# Patient Record
Sex: MALE | Race: WHITE | Employment: OTHER | URBAN - METROPOLITAN AREA
[De-identification: names, ages, dates, MRNs, and addresses within clinical notes are randomized per-mention and may not be internally consistent; named-entity substitution may affect disease eponyms.]

---

## 2019-01-02 ENCOUNTER — OP HISTORICAL/CONVERTED ENCOUNTER (OUTPATIENT)
Dept: OTHER | Age: 79
End: 2019-01-02

## 2019-01-08 ENCOUNTER — OP HISTORICAL/CONVERTED ENCOUNTER (OUTPATIENT)
Dept: OTHER | Age: 79
End: 2019-01-08

## 2019-01-09 ENCOUNTER — OP HISTORICAL/CONVERTED ENCOUNTER (OUTPATIENT)
Dept: OTHER | Age: 79
End: 2019-01-09

## 2019-01-16 ENCOUNTER — OP HISTORICAL/CONVERTED ENCOUNTER (OUTPATIENT)
Dept: OTHER | Age: 79
End: 2019-01-16

## 2019-01-23 ENCOUNTER — OP HISTORICAL/CONVERTED ENCOUNTER (OUTPATIENT)
Dept: OTHER | Age: 79
End: 2019-01-23

## 2019-01-30 ENCOUNTER — OP HISTORICAL/CONVERTED ENCOUNTER (OUTPATIENT)
Dept: OTHER | Age: 79
End: 2019-01-30

## 2019-02-06 ENCOUNTER — OP HISTORICAL/CONVERTED ENCOUNTER (OUTPATIENT)
Dept: OTHER | Age: 79
End: 2019-02-06

## 2019-02-13 ENCOUNTER — OP HISTORICAL/CONVERTED ENCOUNTER (OUTPATIENT)
Dept: OTHER | Age: 79
End: 2019-02-13

## 2019-02-20 ENCOUNTER — OP HISTORICAL/CONVERTED ENCOUNTER (OUTPATIENT)
Dept: OTHER | Age: 79
End: 2019-02-20

## 2019-03-06 ENCOUNTER — OP HISTORICAL/CONVERTED ENCOUNTER (OUTPATIENT)
Dept: OTHER | Age: 79
End: 2019-03-06

## 2019-03-20 ENCOUNTER — OP HISTORICAL/CONVERTED ENCOUNTER (OUTPATIENT)
Dept: OTHER | Age: 79
End: 2019-03-20

## 2019-04-10 ENCOUNTER — OP HISTORICAL/CONVERTED ENCOUNTER (OUTPATIENT)
Dept: OTHER | Age: 79
End: 2019-04-10

## 2019-04-17 ENCOUNTER — OP HISTORICAL/CONVERTED ENCOUNTER (OUTPATIENT)
Dept: OTHER | Age: 79
End: 2019-04-17

## 2019-04-24 ENCOUNTER — OP HISTORICAL/CONVERTED ENCOUNTER (OUTPATIENT)
Dept: OTHER | Age: 79
End: 2019-04-24

## 2019-05-01 ENCOUNTER — OP HISTORICAL/CONVERTED ENCOUNTER (OUTPATIENT)
Dept: OTHER | Age: 79
End: 2019-05-01

## 2019-05-08 ENCOUNTER — OP HISTORICAL/CONVERTED ENCOUNTER (OUTPATIENT)
Dept: OTHER | Age: 79
End: 2019-05-08

## 2019-05-22 ENCOUNTER — OP HISTORICAL/CONVERTED ENCOUNTER (OUTPATIENT)
Dept: OTHER | Age: 79
End: 2019-05-22

## 2019-06-05 ENCOUNTER — OP HISTORICAL/CONVERTED ENCOUNTER (OUTPATIENT)
Dept: OTHER | Age: 79
End: 2019-06-05

## 2019-06-26 ENCOUNTER — OP HISTORICAL/CONVERTED ENCOUNTER (OUTPATIENT)
Dept: OTHER | Age: 79
End: 2019-06-26

## 2019-07-10 ENCOUNTER — OP HISTORICAL/CONVERTED ENCOUNTER (OUTPATIENT)
Dept: OTHER | Age: 79
End: 2019-07-10

## 2019-07-31 ENCOUNTER — OP HISTORICAL/CONVERTED ENCOUNTER (OUTPATIENT)
Dept: OTHER | Age: 79
End: 2019-07-31

## 2019-08-21 ENCOUNTER — OP HISTORICAL/CONVERTED ENCOUNTER (OUTPATIENT)
Dept: OTHER | Age: 79
End: 2019-08-21

## 2019-09-11 ENCOUNTER — OP HISTORICAL/CONVERTED ENCOUNTER (OUTPATIENT)
Dept: OTHER | Age: 79
End: 2019-09-11

## 2019-10-09 ENCOUNTER — OP HISTORICAL/CONVERTED ENCOUNTER (OUTPATIENT)
Dept: OTHER | Age: 79
End: 2019-10-09

## 2020-09-30 VITALS
HEIGHT: 72 IN | SYSTOLIC BLOOD PRESSURE: 126 MMHG | TEMPERATURE: 97.7 F | RESPIRATION RATE: 96 BRPM | BODY MASS INDEX: 39.55 KG/M2 | WEIGHT: 292 LBS | OXYGEN SATURATION: 94 % | DIASTOLIC BLOOD PRESSURE: 62 MMHG | HEART RATE: 92 BPM

## 2020-09-30 PROBLEM — I10 HYPERTENSIVE DISORDER: Status: ACTIVE | Noted: 2020-09-30

## 2020-09-30 PROBLEM — I89.0 LYMPHEDEMA: Status: ACTIVE | Noted: 2020-09-30

## 2020-09-30 RX ORDER — ATORVASTATIN CALCIUM 40 MG/1
40 TABLET, FILM COATED ORAL DAILY
COMMUNITY

## 2020-09-30 RX ORDER — HYDROCHLOROTHIAZIDE 25 MG/1
25 TABLET ORAL DAILY
COMMUNITY
End: 2021-01-01 | Stop reason: ALTCHOICE

## 2020-09-30 RX ORDER — IPRATROPIUM BROMIDE AND ALBUTEROL SULFATE 2.5; .5 MG/3ML; MG/3ML
3 SOLUTION RESPIRATORY (INHALATION)
COMMUNITY
End: 2021-01-01

## 2020-09-30 RX ORDER — ASPIRIN 81 MG/1
81 TABLET ORAL DAILY
COMMUNITY

## 2021-01-01 ENCOUNTER — APPOINTMENT (OUTPATIENT)
Dept: NON INVASIVE DIAGNOSTICS | Age: 81
DRG: 299 | End: 2021-01-01
Attending: INTERNAL MEDICINE
Payer: MEDICARE

## 2021-01-01 ENCOUNTER — APPOINTMENT (OUTPATIENT)
Dept: NON INVASIVE DIAGNOSTICS | Age: 81
DRG: 299 | End: 2021-01-01
Attending: STUDENT IN AN ORGANIZED HEALTH CARE EDUCATION/TRAINING PROGRAM
Payer: MEDICARE

## 2021-01-01 ENCOUNTER — HOSPITAL ENCOUNTER (INPATIENT)
Age: 81
LOS: 10 days | DRG: 299 | End: 2021-11-01
Attending: EMERGENCY MEDICINE | Admitting: INTERNAL MEDICINE
Payer: MEDICARE

## 2021-01-01 ENCOUNTER — APPOINTMENT (OUTPATIENT)
Dept: GENERAL RADIOLOGY | Age: 81
DRG: 299 | End: 2021-01-01
Attending: NURSE PRACTITIONER
Payer: MEDICARE

## 2021-01-01 ENCOUNTER — APPOINTMENT (OUTPATIENT)
Dept: GENERAL RADIOLOGY | Age: 81
DRG: 299 | End: 2021-01-01
Attending: PHYSICIAN ASSISTANT
Payer: MEDICARE

## 2021-01-01 ENCOUNTER — APPOINTMENT (OUTPATIENT)
Dept: CT IMAGING | Age: 81
DRG: 299 | End: 2021-01-01
Attending: NURSE PRACTITIONER
Payer: MEDICARE

## 2021-01-01 ENCOUNTER — APPOINTMENT (OUTPATIENT)
Dept: ULTRASOUND IMAGING | Age: 81
DRG: 299 | End: 2021-01-01
Attending: PHYSICIAN ASSISTANT
Payer: MEDICARE

## 2021-01-01 ENCOUNTER — APPOINTMENT (OUTPATIENT)
Dept: NON INVASIVE DIAGNOSTICS | Age: 81
DRG: 299 | End: 2021-01-01
Attending: SURGERY
Payer: MEDICARE

## 2021-01-01 VITALS
HEART RATE: 67 BPM | TEMPERATURE: 97.6 F | DIASTOLIC BLOOD PRESSURE: 60 MMHG | RESPIRATION RATE: 20 BRPM | OXYGEN SATURATION: 95 % | BODY MASS INDEX: 33.86 KG/M2 | WEIGHT: 250 LBS | HEIGHT: 72 IN | SYSTOLIC BLOOD PRESSURE: 135 MMHG

## 2021-01-01 DIAGNOSIS — R09.02 HYPOXIA: Primary | ICD-10-CM

## 2021-01-01 DIAGNOSIS — R60.9 EDEMA, UNSPECIFIED TYPE: ICD-10-CM

## 2021-01-01 DIAGNOSIS — S91.301A WOUND OF RIGHT FOOT: ICD-10-CM

## 2021-01-01 DIAGNOSIS — R60.9 EDEMA: ICD-10-CM

## 2021-01-01 DIAGNOSIS — S91.302A WOUND OF LEFT FOOT: ICD-10-CM

## 2021-01-01 DIAGNOSIS — I89.0 LYMPHEDEMA OF BOTH LOWER EXTREMITIES: ICD-10-CM

## 2021-01-01 LAB
25(OH)D3 SERPL-MCNC: 33.2 NG/ML (ref 30–100)
ABO + RH BLD: NORMAL
ALBUMIN SERPL-MCNC: 1.5 G/DL (ref 3.5–5)
ALBUMIN SERPL-MCNC: 1.5 G/DL (ref 3.5–5)
ALBUMIN SERPL-MCNC: 1.6 G/DL (ref 3.5–5)
ALBUMIN SERPL-MCNC: 1.8 G/DL (ref 3.5–5)
ALBUMIN SERPL-MCNC: 2 G/DL (ref 3.5–5)
ALBUMIN SERPL-MCNC: 2.3 G/DL (ref 3.5–5)
ALBUMIN SERPL-MCNC: 2.3 G/DL (ref 3.5–5)
ALBUMIN SERPL-MCNC: 2.5 G/DL (ref 3.5–5)
ALBUMIN/GLOB SERPL: 0.4 {RATIO} (ref 1.1–2.2)
ALBUMIN/GLOB SERPL: 0.5 {RATIO} (ref 1.1–2.2)
ALP SERPL-CCNC: 119 U/L (ref 45–117)
ALP SERPL-CCNC: 58 U/L (ref 45–117)
ALT SERPL-CCNC: 22 U/L (ref 12–78)
ALT SERPL-CCNC: 24 U/L (ref 12–78)
ANION GAP SERPL CALC-SCNC: 10 MMOL/L (ref 5–15)
ANION GAP SERPL CALC-SCNC: 11 MMOL/L (ref 5–15)
ANION GAP SERPL CALC-SCNC: 4 MMOL/L (ref 5–15)
ANION GAP SERPL CALC-SCNC: 5 MMOL/L (ref 5–15)
ANION GAP SERPL CALC-SCNC: 5 MMOL/L (ref 5–15)
ANION GAP SERPL CALC-SCNC: 6 MMOL/L (ref 5–15)
ANION GAP SERPL CALC-SCNC: 6 MMOL/L (ref 5–15)
ANION GAP SERPL CALC-SCNC: 7 MMOL/L (ref 5–15)
ANION GAP SERPL CALC-SCNC: 8 MMOL/L (ref 5–15)
APPEARANCE UR: CLEAR
AST SERPL W P-5'-P-CCNC: 18 U/L (ref 15–37)
AST SERPL W P-5'-P-CCNC: 37 U/L (ref 15–37)
ATRIAL RATE: 62 BPM
ATRIAL RATE: 63 BPM
BACTERIA SPEC CULT: NORMAL
BACTERIA URNS QL MICRO: NEGATIVE /HPF
BASOPHILS # BLD: 0 K/UL (ref 0–0.1)
BASOPHILS # BLD: 0.1 K/UL (ref 0–0.1)
BASOPHILS # BLD: 0.2 K/UL (ref 0–0.1)
BASOPHILS NFR BLD: 0 % (ref 0–1)
BASOPHILS NFR BLD: 1 % (ref 0–1)
BILIRUB SERPL-MCNC: 0.3 MG/DL (ref 0.2–1)
BILIRUB SERPL-MCNC: 0.6 MG/DL (ref 0.2–1)
BILIRUB UR QL: NEGATIVE
BLD PROD TYP BPU: NORMAL
BLOOD GROUP ANTIBODIES SERPL: NEGATIVE
BNP SERPL-MCNC: ABNORMAL PG/ML
BPU ID: NORMAL
BUN SERPL-MCNC: 130 MG/DL (ref 6–20)
BUN SERPL-MCNC: 132 MG/DL (ref 6–20)
BUN SERPL-MCNC: 143 MG/DL (ref 6–20)
BUN SERPL-MCNC: 143 MG/DL (ref 6–20)
BUN SERPL-MCNC: 179 MG/DL (ref 6–20)
BUN SERPL-MCNC: 183 MG/DL (ref 6–20)
BUN SERPL-MCNC: 47 MG/DL (ref 6–20)
BUN SERPL-MCNC: 50 MG/DL (ref 6–20)
BUN SERPL-MCNC: 59 MG/DL (ref 6–20)
BUN SERPL-MCNC: 71 MG/DL (ref 6–20)
BUN SERPL-MCNC: 88 MG/DL (ref 6–20)
BUN/CREAT SERPL: 16 (ref 12–20)
BUN/CREAT SERPL: 16 (ref 12–20)
BUN/CREAT SERPL: 17 (ref 12–20)
BUN/CREAT SERPL: 18 (ref 12–20)
BUN/CREAT SERPL: 25 (ref 12–20)
BUN/CREAT SERPL: 37 (ref 12–20)
BUN/CREAT SERPL: 40 (ref 12–20)
BUN/CREAT SERPL: 41 (ref 12–20)
BUN/CREAT SERPL: 44 (ref 12–20)
BUN/CREAT SERPL: 47 (ref 12–20)
BUN/CREAT SERPL: 48 (ref 12–20)
CA-I BLD-MCNC: 7.2 MG/DL (ref 8.5–10.1)
CA-I BLD-MCNC: 8 MG/DL (ref 8.5–10.1)
CA-I BLD-MCNC: 8.1 MG/DL (ref 8.5–10.1)
CA-I BLD-MCNC: 8.3 MG/DL (ref 8.5–10.1)
CA-I BLD-MCNC: 8.3 MG/DL (ref 8.5–10.1)
CA-I BLD-MCNC: 8.4 MG/DL (ref 8.5–10.1)
CA-I BLD-MCNC: 8.5 MG/DL (ref 8.5–10.1)
CA-I BLD-MCNC: 8.6 MG/DL (ref 8.5–10.1)
CA-I BLD-MCNC: 8.8 MG/DL (ref 8.5–10.1)
CA-I BLD-MCNC: 9 MG/DL (ref 8.5–10.1)
CALCULATED P AXIS, ECG09: 37 DEGREES
CALCULATED P AXIS, ECG09: 62 DEGREES
CALCULATED R AXIS, ECG10: 22 DEGREES
CALCULATED R AXIS, ECG10: 86 DEGREES
CALCULATED T AXIS, ECG11: -16 DEGREES
CALCULATED T AXIS, ECG11: 12 DEGREES
CHLORIDE SERPL-SCNC: 106 MMOL/L (ref 97–108)
CHLORIDE SERPL-SCNC: 107 MMOL/L (ref 97–108)
CHLORIDE SERPL-SCNC: 107 MMOL/L (ref 97–108)
CHLORIDE SERPL-SCNC: 109 MMOL/L (ref 97–108)
CHLORIDE SERPL-SCNC: 110 MMOL/L (ref 97–108)
CHLORIDE SERPL-SCNC: 111 MMOL/L (ref 97–108)
CHLORIDE SERPL-SCNC: 112 MMOL/L (ref 97–108)
CHLORIDE SERPL-SCNC: 113 MMOL/L (ref 97–108)
CHLORIDE SERPL-SCNC: 114 MMOL/L (ref 97–108)
CHLORIDE UR-SCNC: 117 MMOL/L
CHOLEST SERPL-MCNC: 165 MG/DL
CO2 SERPL-SCNC: 26 MMOL/L (ref 21–32)
CO2 SERPL-SCNC: 27 MMOL/L (ref 21–32)
CO2 SERPL-SCNC: 28 MMOL/L (ref 21–32)
CO2 SERPL-SCNC: 28 MMOL/L (ref 21–32)
CO2 SERPL-SCNC: 29 MMOL/L (ref 21–32)
CO2 SERPL-SCNC: 29 MMOL/L (ref 21–32)
CO2 SERPL-SCNC: 30 MMOL/L (ref 21–32)
CO2 SERPL-SCNC: 31 MMOL/L (ref 21–32)
COLLECT DATE STL: ABNORMAL
COLOR UR: ABNORMAL
CREAT SERPL-MCNC: 2.83 MG/DL (ref 0.7–1.3)
CREAT SERPL-MCNC: 3.03 MG/DL (ref 0.7–1.3)
CREAT SERPL-MCNC: 3.11 MG/DL (ref 0.7–1.3)
CREAT SERPL-MCNC: 3.45 MG/DL (ref 0.7–1.3)
CREAT SERPL-MCNC: 3.47 MG/DL (ref 0.7–1.3)
CREAT SERPL-MCNC: 3.52 MG/DL (ref 0.7–1.3)
CREAT SERPL-MCNC: 3.59 MG/DL (ref 0.7–1.3)
CREAT SERPL-MCNC: 3.6 MG/DL (ref 0.7–1.3)
CREAT SERPL-MCNC: 3.82 MG/DL (ref 0.7–1.3)
CREAT SERPL-MCNC: 3.82 MG/DL (ref 0.7–1.3)
CREAT SERPL-MCNC: 3.89 MG/DL (ref 0.7–1.3)
CREAT UR-MCNC: 28 MG/DL
CREAT UR-MCNC: 29 MG/DL
CROSSMATCH RESULT,%XM: NORMAL
CRP SERPL-MCNC: 3.7 MG/DL (ref 0–0.6)
DATE LAST DOSE: 0
DIAGNOSIS, 93000: NORMAL
DIAGNOSIS, 93000: NORMAL
DIFFERENTIAL METHOD BLD: ABNORMAL
ECHO AO ASC DIAM: 3.8 CM
ECHO AO ROOT DIAM: 3.3 CM
ECHO AR MAX VEL PISA: 416 CM/S
ECHO AV AREA PEAK VELOCITY: 1.88 CM2
ECHO AV AREA VTI: 1.84 CM2
ECHO AV AREA/BSA PEAK VELOCITY: 0.8 CM2/M2
ECHO AV AREA/BSA VTI: 0.8 CM2/M2
ECHO AV MEAN GRADIENT: 24 MMHG
ECHO AV MEAN VELOCITY: 215 CM/S
ECHO AV PEAK GRADIENT: 43 MMHG
ECHO AV PEAK VELOCITY: 327 CM/S
ECHO AV REGURGITANT PHT: 641 MS
ECHO AV VTI: 77 CM
ECHO LA MAJOR AXIS: 4.5 CM
ECHO LA MINOR AXIS: 1.92 CM
ECHO LV E' SEPTAL VELOCITY: 4.78 CM/S
ECHO LV EDV A2C: 87 CM3
ECHO LV EDV A4C: 40 CM3
ECHO LV ESV A2C: 68.4 CM3
ECHO LV ESV A4C: 58 CM3
ECHO LV INTERNAL DIMENSION DIASTOLIC: 5.47 CM (ref 4.2–5.9)
ECHO LV INTERNAL DIMENSION SYSTOLIC: 4.09 CM
ECHO LV IVSD: 1.26 CM (ref 0.6–1)
ECHO LV MASS 2D: 231.1 G (ref 88–224)
ECHO LV MASS INDEX 2D: 98.8 G/M2 (ref 49–115)
ECHO LV POSTERIOR WALL DIASTOLIC: 0.88 CM (ref 0.6–1)
ECHO LVOT DIAM: 2.6 CM
ECHO LVOT PEAK GRADIENT: 5 MMHG
ECHO LVOT PEAK VELOCITY: 116 CM/S
ECHO LVOT SV: 142 CM3
ECHO LVOT VTI: 26.7 CM
ECHO MV A VELOCITY: 87.6 CM/S
ECHO MV AREA PHT: 2.65 CM2
ECHO MV E VELOCITY: 53.6 CM/S
ECHO MV E/A RATIO: 0.61
ECHO MV E/E' SEPTAL: 11.21
ECHO MV MAX VELOCITY: 95 CM/S
ECHO MV MEAN GRADIENT: 1 MMHG
ECHO MV PEAK GRADIENT: 4 MMHG
ECHO MV PRESSURE HALF TIME (PHT): 83 MS
ECHO MV VTI: 38.4 CM
ECHO PV MAX VELOCITY: 156 CM/S
ECHO PV MEAN GRADIENT: 5 MMHG
ECHO PV PEAK INSTANTANEOUS GRADIENT SYSTOLIC: 10 MMHG
ECHO PV VTI: 25.2 CM
ECHO TV MAX VELOCITY: 142 CM/S
ECHO TV REGURGITANT PEAK GRADIENT: 8 MMHG
EOSINOPHIL # BLD: 0 K/UL (ref 0–0.4)
EOSINOPHIL # BLD: 0.1 K/UL (ref 0–0.4)
EOSINOPHIL # BLD: 0.1 K/UL (ref 0–0.4)
EOSINOPHIL NFR BLD: 0 % (ref 0–7)
EOSINOPHIL NFR BLD: 1 % (ref 0–7)
EOSINOPHIL NFR BLD: 1 % (ref 0–7)
ERYTHROCYTE [DISTWIDTH] IN BLOOD BY AUTOMATED COUNT: 14.9 % (ref 11.5–14.5)
ERYTHROCYTE [DISTWIDTH] IN BLOOD BY AUTOMATED COUNT: 15 % (ref 11.5–14.5)
ERYTHROCYTE [DISTWIDTH] IN BLOOD BY AUTOMATED COUNT: 15 % (ref 11.5–14.5)
ERYTHROCYTE [DISTWIDTH] IN BLOOD BY AUTOMATED COUNT: 15.1 % (ref 11.5–14.5)
ERYTHROCYTE [DISTWIDTH] IN BLOOD BY AUTOMATED COUNT: 15.3 % (ref 11.5–14.5)
ERYTHROCYTE [DISTWIDTH] IN BLOOD BY AUTOMATED COUNT: 15.4 % (ref 11.5–14.5)
ERYTHROCYTE [DISTWIDTH] IN BLOOD BY AUTOMATED COUNT: 17.2 % (ref 11.5–14.5)
ERYTHROCYTE [DISTWIDTH] IN BLOOD BY AUTOMATED COUNT: 19 % (ref 11.5–14.5)
EST. AVERAGE GLUCOSE BLD GHB EST-MCNC: 105 MG/DL
FERRITIN SERPL-MCNC: 105 NG/ML (ref 26–388)
FERRITIN SERPL-MCNC: 71 NG/ML (ref 26–388)
GLOBULIN SER CALC-MCNC: 3.2 G/DL (ref 2–4)
GLOBULIN SER CALC-MCNC: 5.9 G/DL (ref 2–4)
GLUCOSE SERPL-MCNC: 100 MG/DL (ref 65–100)
GLUCOSE SERPL-MCNC: 103 MG/DL (ref 65–100)
GLUCOSE SERPL-MCNC: 104 MG/DL (ref 65–100)
GLUCOSE SERPL-MCNC: 105 MG/DL (ref 65–100)
GLUCOSE SERPL-MCNC: 107 MG/DL (ref 65–100)
GLUCOSE SERPL-MCNC: 111 MG/DL (ref 65–100)
GLUCOSE SERPL-MCNC: 115 MG/DL (ref 65–100)
GLUCOSE SERPL-MCNC: 122 MG/DL (ref 65–100)
GLUCOSE SERPL-MCNC: 124 MG/DL (ref 65–100)
GLUCOSE SERPL-MCNC: 126 MG/DL (ref 65–100)
GLUCOSE SERPL-MCNC: 95 MG/DL (ref 65–100)
GLUCOSE UR STRIP.AUTO-MCNC: NEGATIVE MG/DL
GRAM STN SPEC: NORMAL
HBA1C MFR BLD: 5.3 % (ref 4–5.6)
HCT VFR BLD AUTO: 19.1 % (ref 36.6–50.3)
HCT VFR BLD AUTO: 21.2 % (ref 36.6–50.3)
HCT VFR BLD AUTO: 25.6 % (ref 36.6–50.3)
HCT VFR BLD AUTO: 26.5 % (ref 36.6–50.3)
HCT VFR BLD AUTO: 32.1 % (ref 36.6–50.3)
HCT VFR BLD AUTO: 32.5 % (ref 36.6–50.3)
HCT VFR BLD AUTO: 34.1 % (ref 36.6–50.3)
HCT VFR BLD AUTO: 35.2 % (ref 36.6–50.3)
HCT VFR BLD AUTO: 38.1 % (ref 36.6–50.3)
HCT VFR BLD AUTO: 39.1 % (ref 36.6–50.3)
HDLC SERPL-MCNC: 63 MG/DL
HDLC SERPL: 2.6 {RATIO} (ref 0–5)
HEMOCCULT SP1 STL QL: POSITIVE
HGB BLD-MCNC: 10 G/DL (ref 12.1–17)
HGB BLD-MCNC: 10.4 G/DL (ref 12.1–17)
HGB BLD-MCNC: 10.8 G/DL (ref 12.1–17)
HGB BLD-MCNC: 11.7 G/DL (ref 12.1–17)
HGB BLD-MCNC: 5.7 G/DL (ref 12.1–17)
HGB BLD-MCNC: 6.1 G/DL (ref 12.1–17)
HGB BLD-MCNC: 7.6 G/DL (ref 12.1–17)
HGB BLD-MCNC: 7.9 G/DL (ref 12.1–17)
HGB BLD-MCNC: 9.5 G/DL (ref 12.1–17)
HGB BLD-MCNC: 9.5 G/DL (ref 12.1–17)
HGB UR QL STRIP: ABNORMAL
IMM GRANULOCYTES # BLD AUTO: 0 K/UL
IMM GRANULOCYTES # BLD AUTO: 0 K/UL (ref 0–0.04)
IMM GRANULOCYTES # BLD AUTO: 0.1 K/UL (ref 0–0.04)
IMM GRANULOCYTES NFR BLD AUTO: 0 %
IMM GRANULOCYTES NFR BLD AUTO: 0 % (ref 0–0.5)
IMM GRANULOCYTES NFR BLD AUTO: 1 % (ref 0–0.5)
IRON SATN MFR SERPL: 15 % (ref 20–50)
IRON SATN MFR SERPL: 22 % (ref 20–50)
IRON SATN MFR SERPL: 34 % (ref 20–50)
IRON SERPL-MCNC: 33 UG/DL (ref 35–150)
IRON SERPL-MCNC: 44 UG/DL (ref 35–150)
IRON SERPL-MCNC: 71 UG/DL (ref 35–150)
KETONES UR QL STRIP.AUTO: NEGATIVE MG/DL
LACTATE SERPL-SCNC: 0.9 MMOL/L (ref 0.4–2)
LDLC SERPL CALC-MCNC: 81.6 MG/DL (ref 0–100)
LEFT ABI: 1.2
LEFT ARM BP: 150 MMHG
LEFT POSTERIOR TIBIAL: 184 MMHG
LEUKOCYTE ESTERASE UR QL STRIP.AUTO: NEGATIVE
LIPASE SERPL-CCNC: 121 U/L (ref 73–393)
LIPID PROFILE,FLP: NORMAL
LVOT MG: 3 MMHG
LYMPHOCYTES # BLD: 0.9 K/UL (ref 0.8–3.5)
LYMPHOCYTES # BLD: 1 K/UL (ref 0.8–3.5)
LYMPHOCYTES # BLD: 1.3 K/UL (ref 0.8–3.5)
LYMPHOCYTES # BLD: 1.5 K/UL (ref 0.8–3.5)
LYMPHOCYTES # BLD: 2.5 K/UL (ref 0.8–3.5)
LYMPHOCYTES NFR BLD: 10 % (ref 12–49)
LYMPHOCYTES NFR BLD: 12 % (ref 12–49)
LYMPHOCYTES NFR BLD: 8 % (ref 12–49)
MAGNESIUM SERPL-MCNC: 2 MG/DL (ref 1.6–2.4)
MCH RBC QN AUTO: 27.6 PG (ref 26–34)
MCH RBC QN AUTO: 27.9 PG (ref 26–34)
MCH RBC QN AUTO: 28.4 PG (ref 26–34)
MCH RBC QN AUTO: 28.4 PG (ref 26–34)
MCH RBC QN AUTO: 28.7 PG (ref 26–34)
MCH RBC QN AUTO: 29.5 PG (ref 26–34)
MCHC RBC AUTO-ENTMCNC: 28.3 G/DL (ref 30–36.5)
MCHC RBC AUTO-ENTMCNC: 28.8 G/DL (ref 30–36.5)
MCHC RBC AUTO-ENTMCNC: 29.2 G/DL (ref 30–36.5)
MCHC RBC AUTO-ENTMCNC: 29.3 G/DL (ref 30–36.5)
MCHC RBC AUTO-ENTMCNC: 29.5 G/DL (ref 30–36.5)
MCHC RBC AUTO-ENTMCNC: 29.6 G/DL (ref 30–36.5)
MCHC RBC AUTO-ENTMCNC: 29.7 G/DL (ref 30–36.5)
MCHC RBC AUTO-ENTMCNC: 29.8 G/DL (ref 30–36.5)
MCHC RBC AUTO-ENTMCNC: 29.8 G/DL (ref 30–36.5)
MCHC RBC AUTO-ENTMCNC: 29.9 G/DL (ref 30–36.5)
MCV RBC AUTO: 94.4 FL (ref 80–99)
MCV RBC AUTO: 95.3 FL (ref 80–99)
MCV RBC AUTO: 95.3 FL (ref 80–99)
MCV RBC AUTO: 95.8 FL (ref 80–99)
MCV RBC AUTO: 96.2 FL (ref 80–99)
MCV RBC AUTO: 96.4 FL (ref 80–99)
MCV RBC AUTO: 96.6 FL (ref 80–99)
MCV RBC AUTO: 97.2 FL (ref 80–99)
MCV RBC AUTO: 98.6 FL (ref 80–99)
MCV RBC AUTO: 99 FL (ref 80–99)
MONOCYTES # BLD: 0.6 K/UL (ref 0–1)
MONOCYTES # BLD: 0.7 K/UL (ref 0–1)
MONOCYTES # BLD: 0.8 K/UL (ref 0–1)
MONOCYTES # BLD: 0.8 K/UL (ref 0–1)
MONOCYTES # BLD: 1 K/UL (ref 0–1)
MONOCYTES NFR BLD: 4 % (ref 5–13)
MONOCYTES NFR BLD: 6 % (ref 5–13)
MONOCYTES NFR BLD: 6 % (ref 5–13)
MONOCYTES NFR BLD: 7 % (ref 5–13)
MONOCYTES NFR BLD: 8 % (ref 5–13)
MUCOUS THREADS URNS QL MICRO: ABNORMAL /LPF
MV DEC SLOPE: 1700 MM/S2
MV DEC SLOPE: 1870 MM/S2
MV DEC SLOPE: 2030 MM/S2
NEUTS SEG # BLD: 10.1 K/UL (ref 1.8–8)
NEUTS SEG # BLD: 17.6 K/UL (ref 1.8–8)
NEUTS SEG # BLD: 8.4 K/UL (ref 1.8–8)
NEUTS SEG # BLD: 9.2 K/UL (ref 1.8–8)
NEUTS SEG # BLD: 9.8 K/UL (ref 1.8–8)
NEUTS SEG NFR BLD: 78 % (ref 32–75)
NEUTS SEG NFR BLD: 79 % (ref 32–75)
NEUTS SEG NFR BLD: 82 % (ref 32–75)
NEUTS SEG NFR BLD: 83 % (ref 32–75)
NEUTS SEG NFR BLD: 84 % (ref 32–75)
NITRITE UR QL STRIP.AUTO: NEGATIVE
NRBC # BLD: 0 K/UL (ref 0–0.01)
NRBC # BLD: 0.02 K/UL (ref 0–0.01)
NRBC # BLD: 0.03 K/UL (ref 0–0.01)
NRBC # BLD: 0.04 K/UL (ref 0–0.01)
NRBC # BLD: 0.04 K/UL (ref 0–0.01)
NRBC # BLD: 0.51 K/UL (ref 0–0.01)
NRBC BLD-RTO: 0 PER 100 WBC
NRBC BLD-RTO: 0.1 PER 100 WBC
NRBC BLD-RTO: 0.2 PER 100 WBC
NRBC BLD-RTO: 0.3 PER 100 WBC
NRBC BLD-RTO: 0.3 PER 100 WBC
NRBC BLD-RTO: 2.4 PER 100 WBC
P-R INTERVAL, ECG05: 182 MS
P-R INTERVAL, ECG05: 188 MS
PH UR STRIP: 6 [PH] (ref 5–8)
PHOSPHATE SERPL-MCNC: 3.3 MG/DL (ref 2.6–4.7)
PHOSPHATE SERPL-MCNC: 3.8 MG/DL (ref 2.6–4.7)
PHOSPHATE SERPL-MCNC: 3.8 MG/DL (ref 2.6–4.7)
PHOSPHATE SERPL-MCNC: 4.6 MG/DL (ref 2.6–4.7)
PHOSPHATE SERPL-MCNC: 4.6 MG/DL (ref 2.6–4.7)
PHOSPHATE SERPL-MCNC: 4.8 MG/DL (ref 2.6–4.7)
PLATELET # BLD AUTO: 164 K/UL (ref 150–400)
PLATELET # BLD AUTO: 180 K/UL (ref 150–400)
PLATELET # BLD AUTO: 200 K/UL (ref 150–400)
PLATELET # BLD AUTO: 215 K/UL (ref 150–400)
PLATELET # BLD AUTO: 220 K/UL (ref 150–400)
PLATELET # BLD AUTO: 226 K/UL (ref 150–400)
PLATELET # BLD AUTO: 233 K/UL (ref 150–400)
PLATELET # BLD AUTO: 253 K/UL (ref 150–400)
PLATELET # BLD AUTO: 263 K/UL (ref 150–400)
PLATELET # BLD AUTO: 274 K/UL (ref 150–400)
PMV BLD AUTO: 10.9 FL (ref 8.9–12.9)
PMV BLD AUTO: 11 FL (ref 8.9–12.9)
PMV BLD AUTO: 11 FL (ref 8.9–12.9)
PMV BLD AUTO: 11.1 FL (ref 8.9–12.9)
PMV BLD AUTO: 11.2 FL (ref 8.9–12.9)
PMV BLD AUTO: 11.3 FL (ref 8.9–12.9)
PMV BLD AUTO: 11.5 FL (ref 8.9–12.9)
PMV BLD AUTO: 11.6 FL (ref 8.9–12.9)
POTASSIUM SERPL-SCNC: 3.3 MMOL/L (ref 3.5–5.1)
POTASSIUM SERPL-SCNC: 3.8 MMOL/L (ref 3.5–5.1)
POTASSIUM SERPL-SCNC: 3.9 MMOL/L (ref 3.5–5.1)
POTASSIUM SERPL-SCNC: 4 MMOL/L (ref 3.5–5.1)
POTASSIUM SERPL-SCNC: 4 MMOL/L (ref 3.5–5.1)
POTASSIUM SERPL-SCNC: 4.1 MMOL/L (ref 3.5–5.1)
POTASSIUM SERPL-SCNC: 4.1 MMOL/L (ref 3.5–5.1)
POTASSIUM SERPL-SCNC: 4.4 MMOL/L (ref 3.5–5.1)
POTASSIUM SERPL-SCNC: 4.5 MMOL/L (ref 3.5–5.1)
POTASSIUM SERPL-SCNC: 4.6 MMOL/L (ref 3.5–5.1)
POTASSIUM SERPL-SCNC: 5.1 MMOL/L (ref 3.5–5.1)
POTASSIUM UR-SCNC: 13 MMOL/L
PROT SERPL-MCNC: 4.7 G/DL (ref 6.4–8.2)
PROT SERPL-MCNC: 8.2 G/DL (ref 6.4–8.2)
PROT UR STRIP-MCNC: 100 MG/DL
PROT UR-MCNC: 145 MG/DL (ref 0–11.9)
PROT/CREAT UR-RTO: 5.2
PTH-INTACT SERPL-MCNC: 122.4 PG/ML (ref 18.4–88)
Q-T INTERVAL, ECG07: 484 MS
Q-T INTERVAL, ECG07: 504 MS
QRS DURATION, ECG06: 160 MS
QRS DURATION, ECG06: 178 MS
QTC CALCULATION (BEZET), ECG08: 491 MS
QTC CALCULATION (BEZET), ECG08: 515 MS
RBC # BLD AUTO: 1.93 M/UL (ref 4.1–5.7)
RBC # BLD AUTO: 2.15 M/UL (ref 4.1–5.7)
RBC # BLD AUTO: 2.65 M/UL (ref 4.1–5.7)
RBC # BLD AUTO: 2.75 M/UL (ref 4.1–5.7)
RBC # BLD AUTO: 3.4 M/UL (ref 4.1–5.7)
RBC # BLD AUTO: 3.41 M/UL (ref 4.1–5.7)
RBC # BLD AUTO: 3.58 M/UL (ref 4.1–5.7)
RBC # BLD AUTO: 3.66 M/UL (ref 4.1–5.7)
RBC # BLD AUTO: 3.92 M/UL (ref 4.1–5.7)
RBC # BLD AUTO: 4.08 M/UL (ref 4.1–5.7)
RBC #/AREA URNS HPF: ABNORMAL /HPF (ref 0–5)
RBC MORPH BLD: ABNORMAL
REPORTED DOSE,DOSE: 0 UNITS
RIGHT ABI: 1.01
RIGHT ARM BP: 153 MMHG
RIGHT POSTERIOR TIBIAL: 144 MMHG
SODIUM SERPL-SCNC: 142 MMOL/L (ref 136–145)
SODIUM SERPL-SCNC: 142 MMOL/L (ref 136–145)
SODIUM SERPL-SCNC: 143 MMOL/L (ref 136–145)
SODIUM SERPL-SCNC: 144 MMOL/L (ref 136–145)
SODIUM SERPL-SCNC: 145 MMOL/L (ref 136–145)
SODIUM SERPL-SCNC: 145 MMOL/L (ref 136–145)
SODIUM SERPL-SCNC: 146 MMOL/L (ref 136–145)
SODIUM SERPL-SCNC: 147 MMOL/L (ref 136–145)
SODIUM SERPL-SCNC: 152 MMOL/L (ref 136–145)
SODIUM UR-SCNC: 113 MMOL/L
SP GR UR REFRACTOMETRY: 1.01 (ref 1–1.03)
SPECIAL REQUESTS,SREQ: NORMAL
SPECIMEN EXP DATE BLD: NORMAL
STATUS OF UNIT,%ST: NORMAL
TIBC SERPL-MCNC: 202 UG/DL (ref 250–450)
TIBC SERPL-MCNC: 206 UG/DL (ref 250–450)
TIBC SERPL-MCNC: 213 UG/DL (ref 250–450)
TRANSFUSION STATUS PATIENT QL: NORMAL
TRIGL SERPL-MCNC: 102 MG/DL (ref ?–150)
TROPONIN-HIGH SENSITIVITY: 43 NG/L (ref 0–76)
TSH SERPL DL<=0.05 MIU/L-ACNC: 3.17 UIU/ML (ref 0.36–3.74)
UNIT DIVISION, %UDIV: 0
UROBILINOGEN UR QL STRIP.AUTO: 0.1 EU/DL (ref 0.1–1)
UUN UR-MCNC: 406 MG/DL
VANCOMYCIN SERPL-MCNC: 16 UG/ML
VANCOMYCIN SERPL-MCNC: 5.8 UG/ML
VANCOMYCIN SERPL-MCNC: 9.9 UG/ML
VANCOMYCIN TROUGH SERPL-MCNC: 17.5 UG/ML (ref 5–10)
VAS LEFT DORSALIS PEDIS BP: 149 MMHG
VAS RIGHT DORSALIS PEDIS BP: 155 MMHG
VENTRICULAR RATE, ECG03: 62 BPM
VENTRICULAR RATE, ECG03: 63 BPM
VLDLC SERPL CALC-MCNC: 20.4 MG/DL
WBC # BLD AUTO: 10.2 K/UL (ref 4.1–11.1)
WBC # BLD AUTO: 11.5 K/UL (ref 4.1–11.1)
WBC # BLD AUTO: 11.6 K/UL (ref 4.1–11.1)
WBC # BLD AUTO: 11.6 K/UL (ref 4.1–11.1)
WBC # BLD AUTO: 12.7 K/UL (ref 4.1–11.1)
WBC # BLD AUTO: 14.1 K/UL (ref 4.1–11.1)
WBC # BLD AUTO: 14.3 K/UL (ref 4.1–11.1)
WBC # BLD AUTO: 14.3 K/UL (ref 4.1–11.1)
WBC # BLD AUTO: 17.4 K/UL (ref 4.1–11.1)
WBC # BLD AUTO: 21.1 K/UL (ref 4.1–11.1)
WBC URNS QL MICRO: ABNORMAL /HPF (ref 0–4)

## 2021-01-01 PROCEDURE — 74011250637 HC RX REV CODE- 250/637: Performed by: INTERNAL MEDICINE

## 2021-01-01 PROCEDURE — 74011250636 HC RX REV CODE- 250/636: Performed by: NURSE PRACTITIONER

## 2021-01-01 PROCEDURE — 83690 ASSAY OF LIPASE: CPT

## 2021-01-01 PROCEDURE — 74011250636 HC RX REV CODE- 250/636: Performed by: INTERNAL MEDICINE

## 2021-01-01 PROCEDURE — 74011000258 HC RX REV CODE- 258: Performed by: INTERNAL MEDICINE

## 2021-01-01 PROCEDURE — 80202 ASSAY OF VANCOMYCIN: CPT

## 2021-01-01 PROCEDURE — 77010033678 HC OXYGEN DAILY

## 2021-01-01 PROCEDURE — 94760 N-INVAS EAR/PLS OXIMETRY 1: CPT

## 2021-01-01 PROCEDURE — 65270000029 HC RM PRIVATE

## 2021-01-01 PROCEDURE — 36430 TRANSFUSION BLD/BLD COMPNT: CPT

## 2021-01-01 PROCEDURE — 94640 AIRWAY INHALATION TREATMENT: CPT

## 2021-01-01 PROCEDURE — 80048 BASIC METABOLIC PNL TOTAL CA: CPT

## 2021-01-01 PROCEDURE — 83036 HEMOGLOBIN GLYCOSYLATED A1C: CPT

## 2021-01-01 PROCEDURE — 85025 COMPLETE CBC W/AUTO DIFF WBC: CPT

## 2021-01-01 PROCEDURE — 74011250637 HC RX REV CODE- 250/637: Performed by: STUDENT IN AN ORGANIZED HEALTH CARE EDUCATION/TRAINING PROGRAM

## 2021-01-01 PROCEDURE — 74011000258 HC RX REV CODE- 258: Performed by: NURSE PRACTITIONER

## 2021-01-01 PROCEDURE — A9270 NON-COVERED ITEM OR SERVICE: HCPCS | Performed by: STUDENT IN AN ORGANIZED HEALTH CARE EDUCATION/TRAINING PROGRAM

## 2021-01-01 PROCEDURE — 74011636637 HC RX REV CODE- 636/637: Performed by: PHYSICIAN ASSISTANT

## 2021-01-01 PROCEDURE — 99218 HC RM OBSERVATION: CPT

## 2021-01-01 PROCEDURE — 82570 ASSAY OF URINE CREATININE: CPT

## 2021-01-01 PROCEDURE — 83880 ASSAY OF NATRIURETIC PEPTIDE: CPT

## 2021-01-01 PROCEDURE — 74011250636 HC RX REV CODE- 250/636: Performed by: PHYSICIAN ASSISTANT

## 2021-01-01 PROCEDURE — 74011250636 HC RX REV CODE- 250/636: Performed by: HOSPITALIST

## 2021-01-01 PROCEDURE — 82728 ASSAY OF FERRITIN: CPT

## 2021-01-01 PROCEDURE — 80061 LIPID PANEL: CPT

## 2021-01-01 PROCEDURE — 80069 RENAL FUNCTION PANEL: CPT

## 2021-01-01 PROCEDURE — 36415 COLL VENOUS BLD VENIPUNCTURE: CPT

## 2021-01-01 PROCEDURE — 85027 COMPLETE CBC AUTOMATED: CPT

## 2021-01-01 PROCEDURE — 96365 THER/PROPH/DIAG IV INF INIT: CPT

## 2021-01-01 PROCEDURE — 93005 ELECTROCARDIOGRAM TRACING: CPT

## 2021-01-01 PROCEDURE — 82436 ASSAY OF URINE CHLORIDE: CPT

## 2021-01-01 PROCEDURE — 74011250637 HC RX REV CODE- 250/637: Performed by: NURSE PRACTITIONER

## 2021-01-01 PROCEDURE — P9047 ALBUMIN (HUMAN), 25%, 50ML: HCPCS | Performed by: INTERNAL MEDICINE

## 2021-01-01 PROCEDURE — 83540 ASSAY OF IRON: CPT

## 2021-01-01 PROCEDURE — 93923 UPR/LXTR ART STDY 3+ LVLS: CPT | Performed by: SURGERY

## 2021-01-01 PROCEDURE — 74011250636 HC RX REV CODE- 250/636: Performed by: STUDENT IN AN ORGANIZED HEALTH CARE EDUCATION/TRAINING PROGRAM

## 2021-01-01 PROCEDURE — 93306 TTE W/DOPPLER COMPLETE: CPT

## 2021-01-01 PROCEDURE — 87147 CULTURE TYPE IMMUNOLOGIC: CPT

## 2021-01-01 PROCEDURE — 96374 THER/PROPH/DIAG INJ IV PUSH: CPT

## 2021-01-01 PROCEDURE — 97530 THERAPEUTIC ACTIVITIES: CPT

## 2021-01-01 PROCEDURE — 83735 ASSAY OF MAGNESIUM: CPT

## 2021-01-01 PROCEDURE — 83605 ASSAY OF LACTIC ACID: CPT

## 2021-01-01 PROCEDURE — 96372 THER/PROPH/DIAG INJ SC/IM: CPT

## 2021-01-01 PROCEDURE — 93970 EXTREMITY STUDY: CPT | Performed by: SURGERY

## 2021-01-01 PROCEDURE — 74011000250 HC RX REV CODE- 250: Performed by: STUDENT IN AN ORGANIZED HEALTH CARE EDUCATION/TRAINING PROGRAM

## 2021-01-01 PROCEDURE — 84300 ASSAY OF URINE SODIUM: CPT

## 2021-01-01 PROCEDURE — 87070 CULTURE OTHR SPECIMN AEROBIC: CPT

## 2021-01-01 PROCEDURE — 82306 VITAMIN D 25 HYDROXY: CPT

## 2021-01-01 PROCEDURE — 99222 1ST HOSP IP/OBS MODERATE 55: CPT | Performed by: SURGERY

## 2021-01-01 PROCEDURE — 86901 BLOOD TYPING SEROLOGIC RH(D): CPT

## 2021-01-01 PROCEDURE — 80053 COMPREHEN METABOLIC PANEL: CPT

## 2021-01-01 PROCEDURE — 86140 C-REACTIVE PROTEIN: CPT

## 2021-01-01 PROCEDURE — P9016 RBC LEUKOCYTES REDUCED: HCPCS

## 2021-01-01 PROCEDURE — 30233N1 TRANSFUSION OF NONAUTOLOGOUS RED BLOOD CELLS INTO PERIPHERAL VEIN, PERCUTANEOUS APPROACH: ICD-10-PCS | Performed by: HOSPITALIST

## 2021-01-01 PROCEDURE — 96376 TX/PRO/DX INJ SAME DRUG ADON: CPT

## 2021-01-01 PROCEDURE — 81001 URINALYSIS AUTO W/SCOPE: CPT

## 2021-01-01 PROCEDURE — 73620 X-RAY EXAM OF FOOT: CPT

## 2021-01-01 PROCEDURE — 74011250637 HC RX REV CODE- 250/637: Performed by: PHYSICIAN ASSISTANT

## 2021-01-01 PROCEDURE — 97162 PT EVAL MOD COMPLEX 30 MIN: CPT

## 2021-01-01 PROCEDURE — 99232 SBSQ HOSP IP/OBS MODERATE 35: CPT | Performed by: SURGERY

## 2021-01-01 PROCEDURE — 86923 COMPATIBILITY TEST ELECTRIC: CPT

## 2021-01-01 PROCEDURE — 96366 THER/PROPH/DIAG IV INF ADDON: CPT

## 2021-01-01 PROCEDURE — 71045 X-RAY EXAM CHEST 1 VIEW: CPT

## 2021-01-01 PROCEDURE — 87040 BLOOD CULTURE FOR BACTERIA: CPT

## 2021-01-01 PROCEDURE — 83970 ASSAY OF PARATHORMONE: CPT

## 2021-01-01 PROCEDURE — 93970 EXTREMITY STUDY: CPT

## 2021-01-01 PROCEDURE — 93923 UPR/LXTR ART STDY 3+ LVLS: CPT

## 2021-01-01 PROCEDURE — 84540 ASSAY OF URINE/UREA-N: CPT

## 2021-01-01 PROCEDURE — 96375 TX/PRO/DX INJ NEW DRUG ADDON: CPT

## 2021-01-01 PROCEDURE — 87186 SC STD MICRODIL/AGAR DIL: CPT

## 2021-01-01 PROCEDURE — 84443 ASSAY THYROID STIM HORMONE: CPT

## 2021-01-01 PROCEDURE — 82272 OCCULT BLD FECES 1-3 TESTS: CPT

## 2021-01-01 PROCEDURE — 97110 THERAPEUTIC EXERCISES: CPT

## 2021-01-01 PROCEDURE — 97165 OT EVAL LOW COMPLEX 30 MIN: CPT

## 2021-01-01 PROCEDURE — 87077 CULTURE AEROBIC IDENTIFY: CPT

## 2021-01-01 PROCEDURE — 76770 US EXAM ABDO BACK WALL COMP: CPT

## 2021-01-01 PROCEDURE — 74011636637 HC RX REV CODE- 636/637: Performed by: STUDENT IN AN ORGANIZED HEALTH CARE EDUCATION/TRAINING PROGRAM

## 2021-01-01 PROCEDURE — 84133 ASSAY OF URINE POTASSIUM: CPT

## 2021-01-01 PROCEDURE — 99285 EMERGENCY DEPT VISIT HI MDM: CPT

## 2021-01-01 PROCEDURE — 84484 ASSAY OF TROPONIN QUANT: CPT

## 2021-01-01 RX ORDER — PREDNISONE 20 MG/1
40 TABLET ORAL
Status: DISCONTINUED | OUTPATIENT
Start: 2021-01-01 | End: 2021-01-01

## 2021-01-01 RX ORDER — LOPERAMIDE HYDROCHLORIDE 2 MG/1
2 CAPSULE ORAL
Status: DISCONTINUED | OUTPATIENT
Start: 2021-01-01 | End: 2021-01-01 | Stop reason: HOSPADM

## 2021-01-01 RX ORDER — HYDROXYZINE PAMOATE 25 MG/1
25 CAPSULE ORAL
Status: DISCONTINUED | OUTPATIENT
Start: 2021-01-01 | End: 2021-01-01 | Stop reason: HOSPADM

## 2021-01-01 RX ORDER — FUROSEMIDE 10 MG/ML
100 INJECTION INTRAMUSCULAR; INTRAVENOUS
Status: COMPLETED | OUTPATIENT
Start: 2021-01-01 | End: 2021-01-01

## 2021-01-01 RX ORDER — FUROSEMIDE 10 MG/ML
40 INJECTION INTRAMUSCULAR; INTRAVENOUS 2 TIMES DAILY
Status: DISCONTINUED | OUTPATIENT
Start: 2021-01-01 | End: 2021-01-01

## 2021-01-01 RX ORDER — IPRATROPIUM BROMIDE AND ALBUTEROL SULFATE 2.5; .5 MG/3ML; MG/3ML
3 SOLUTION RESPIRATORY (INHALATION)
Status: DISPENSED | OUTPATIENT
Start: 2021-01-01 | End: 2021-01-01

## 2021-01-01 RX ORDER — METOPROLOL SUCCINATE 50 MG/1
50 TABLET, EXTENDED RELEASE ORAL DAILY
Status: DISCONTINUED | OUTPATIENT
Start: 2021-01-01 | End: 2021-01-01

## 2021-01-01 RX ORDER — DOCUSATE SODIUM 100 MG/1
100 CAPSULE, LIQUID FILLED ORAL 2 TIMES DAILY
Status: DISCONTINUED | OUTPATIENT
Start: 2021-01-01 | End: 2021-01-01 | Stop reason: HOSPADM

## 2021-01-01 RX ORDER — PANTOPRAZOLE SODIUM 40 MG/1
40 TABLET, DELAYED RELEASE ORAL
Status: DISCONTINUED | OUTPATIENT
Start: 2021-01-01 | End: 2021-01-01 | Stop reason: HOSPADM

## 2021-01-01 RX ORDER — AMMONIUM LACTATE 12 G/100G
CREAM TOPICAL DAILY
Status: DISCONTINUED | OUTPATIENT
Start: 2021-01-01 | End: 2021-01-01 | Stop reason: HOSPADM

## 2021-01-01 RX ORDER — BUDESONIDE AND FORMOTEROL FUMARATE DIHYDRATE 160; 4.5 UG/1; UG/1
2 AEROSOL RESPIRATORY (INHALATION)
Status: DISCONTINUED | OUTPATIENT
Start: 2021-01-01 | End: 2021-01-01 | Stop reason: HOSPADM

## 2021-01-01 RX ORDER — ASPIRIN 81 MG/1
81 TABLET ORAL DAILY
Status: DISCONTINUED | OUTPATIENT
Start: 2021-01-01 | End: 2021-01-01

## 2021-01-01 RX ORDER — IPRATROPIUM BROMIDE AND ALBUTEROL SULFATE 2.5; .5 MG/3ML; MG/3ML
3 SOLUTION RESPIRATORY (INHALATION)
Status: DISCONTINUED | OUTPATIENT
Start: 2021-01-01 | End: 2021-01-01

## 2021-01-01 RX ORDER — POLYETHYLENE GLYCOL 3350 17 G/17G
17 POWDER, FOR SOLUTION ORAL DAILY PRN
Status: DISCONTINUED | OUTPATIENT
Start: 2021-01-01 | End: 2021-01-01

## 2021-01-01 RX ORDER — MAG HYDROX/ALUMINUM HYD/SIMETH 200-200-20
30 SUSPENSION, ORAL (FINAL DOSE FORM) ORAL
Status: DISCONTINUED | OUTPATIENT
Start: 2021-01-01 | End: 2021-01-01 | Stop reason: HOSPADM

## 2021-01-01 RX ORDER — ACETAMINOPHEN 650 MG/1
650 SUPPOSITORY RECTAL
Status: DISCONTINUED | OUTPATIENT
Start: 2021-01-01 | End: 2021-01-01 | Stop reason: HOSPADM

## 2021-01-01 RX ORDER — ENOXAPARIN SODIUM 100 MG/ML
30 INJECTION SUBCUTANEOUS DAILY
Status: DISCONTINUED | OUTPATIENT
Start: 2021-01-01 | End: 2021-01-01

## 2021-01-01 RX ORDER — METOPROLOL SUCCINATE 25 MG/1
25 TABLET, EXTENDED RELEASE ORAL DAILY
Status: DISCONTINUED | OUTPATIENT
Start: 2021-01-01 | End: 2021-01-01 | Stop reason: HOSPADM

## 2021-01-01 RX ORDER — SODIUM CHLORIDE 9 MG/ML
250 INJECTION, SOLUTION INTRAVENOUS AS NEEDED
Status: DISCONTINUED | OUTPATIENT
Start: 2021-01-01 | End: 2021-01-01 | Stop reason: HOSPADM

## 2021-01-01 RX ORDER — DILTIAZEM HYDROCHLORIDE 30 MG/1
30 TABLET, FILM COATED ORAL
Status: DISCONTINUED | OUTPATIENT
Start: 2021-01-01 | End: 2021-01-01 | Stop reason: HOSPADM

## 2021-01-01 RX ORDER — CLONIDINE HYDROCHLORIDE 0.2 MG/1
0.2 TABLET ORAL
COMMUNITY

## 2021-01-01 RX ORDER — SODIUM CHLORIDE 0.9 % (FLUSH) 0.9 %
5-40 SYRINGE (ML) INJECTION AS NEEDED
Status: DISCONTINUED | OUTPATIENT
Start: 2021-01-01 | End: 2021-01-01 | Stop reason: HOSPADM

## 2021-01-01 RX ORDER — ALBUMIN HUMAN 250 G/1000ML
12.5 SOLUTION INTRAVENOUS EVERY 6 HOURS
Status: COMPLETED | OUTPATIENT
Start: 2021-01-01 | End: 2021-01-01

## 2021-01-01 RX ORDER — HYDROCODONE BITARTRATE AND ACETAMINOPHEN 5; 325 MG/1; MG/1
1 TABLET ORAL
Status: DISCONTINUED | OUTPATIENT
Start: 2021-01-01 | End: 2021-01-01 | Stop reason: HOSPADM

## 2021-01-01 RX ORDER — CALCITRIOL 0.25 UG/1
0.25 CAPSULE ORAL DAILY
Status: DISCONTINUED | OUTPATIENT
Start: 2021-01-01 | End: 2021-01-01 | Stop reason: HOSPADM

## 2021-01-01 RX ORDER — AMLODIPINE BESYLATE 5 MG/1
5 TABLET ORAL DAILY
Status: DISCONTINUED | OUTPATIENT
Start: 2021-01-01 | End: 2021-01-01

## 2021-01-01 RX ORDER — METOPROLOL SUCCINATE 50 MG/1
1 TABLET, EXTENDED RELEASE ORAL DAILY
COMMUNITY
Start: 2021-01-01

## 2021-01-01 RX ORDER — POLYETHYLENE GLYCOL 3350 17 G/17G
17 POWDER, FOR SOLUTION ORAL DAILY
Status: DISCONTINUED | OUTPATIENT
Start: 2021-01-01 | End: 2021-01-01

## 2021-01-01 RX ORDER — ACETAMINOPHEN 325 MG/1
650 TABLET ORAL
Status: DISCONTINUED | OUTPATIENT
Start: 2021-01-01 | End: 2021-01-01 | Stop reason: HOSPADM

## 2021-01-01 RX ORDER — TRIAMCINOLONE ACETONIDE 5 MG/G
OINTMENT TOPICAL
COMMUNITY

## 2021-01-01 RX ORDER — ADHESIVE BANDAGE
30 BANDAGE TOPICAL DAILY PRN
Status: DISCONTINUED | OUTPATIENT
Start: 2021-01-01 | End: 2021-01-01 | Stop reason: HOSPADM

## 2021-01-01 RX ORDER — SODIUM CHLORIDE 0.9 % (FLUSH) 0.9 %
5-40 SYRINGE (ML) INJECTION AS NEEDED
Status: CANCELLED | OUTPATIENT
Start: 2021-01-01

## 2021-01-01 RX ORDER — ONDANSETRON 2 MG/ML
4 INJECTION INTRAMUSCULAR; INTRAVENOUS
Status: DISCONTINUED | OUTPATIENT
Start: 2021-01-01 | End: 2021-01-01 | Stop reason: HOSPADM

## 2021-01-01 RX ORDER — HEPARIN SODIUM 5000 [USP'U]/ML
5000 INJECTION, SOLUTION INTRAVENOUS; SUBCUTANEOUS EVERY 8 HOURS
Status: DISCONTINUED | OUTPATIENT
Start: 2021-01-01 | End: 2021-01-01

## 2021-01-01 RX ORDER — SODIUM CHLORIDE 0.9 % (FLUSH) 0.9 %
5-40 SYRINGE (ML) INJECTION EVERY 8 HOURS
Status: CANCELLED | OUTPATIENT
Start: 2021-01-01

## 2021-01-01 RX ORDER — SODIUM CHLORIDE 9 MG/ML
75 INJECTION, SOLUTION INTRAVENOUS CONTINUOUS
Status: CANCELLED | OUTPATIENT
Start: 2021-01-01

## 2021-01-01 RX ORDER — CLONIDINE HYDROCHLORIDE 0.2 MG/1
0.1 TABLET ORAL
COMMUNITY
Start: 2021-01-01

## 2021-01-01 RX ORDER — HYDRALAZINE HYDROCHLORIDE 20 MG/ML
20 INJECTION INTRAMUSCULAR; INTRAVENOUS
Status: DISCONTINUED | OUTPATIENT
Start: 2021-01-01 | End: 2021-01-01 | Stop reason: HOSPADM

## 2021-01-01 RX ORDER — ONDANSETRON 4 MG/1
4 TABLET, ORALLY DISINTEGRATING ORAL
Status: DISCONTINUED | OUTPATIENT
Start: 2021-01-01 | End: 2021-01-01 | Stop reason: HOSPADM

## 2021-01-01 RX ORDER — ATORVASTATIN CALCIUM 40 MG/1
40 TABLET, FILM COATED ORAL DAILY
Status: DISCONTINUED | OUTPATIENT
Start: 2021-01-01 | End: 2021-01-01 | Stop reason: HOSPADM

## 2021-01-01 RX ORDER — SODIUM CHLORIDE 0.9 % (FLUSH) 0.9 %
5-40 SYRINGE (ML) INJECTION EVERY 8 HOURS
Status: DISCONTINUED | OUTPATIENT
Start: 2021-01-01 | End: 2021-01-01 | Stop reason: HOSPADM

## 2021-01-01 RX ORDER — FUROSEMIDE 40 MG/1
40 TABLET ORAL 2 TIMES DAILY
Status: DISCONTINUED | OUTPATIENT
Start: 2021-01-01 | End: 2021-01-01 | Stop reason: HOSPADM

## 2021-01-01 RX ADMIN — WHITE PETROLATUM,ZINC OXIDE: 57; 17 PASTE TOPICAL at 16:00

## 2021-01-01 RX ADMIN — PIPERACILLIN SODIUM AND TAZOBACTAM SODIUM 3.38 G: 3; .375 INJECTION, POWDER, LYOPHILIZED, FOR SOLUTION INTRAVENOUS at 12:05

## 2021-01-01 RX ADMIN — PREDNISONE 40 MG: 20 TABLET ORAL at 09:29

## 2021-01-01 RX ADMIN — ATORVASTATIN CALCIUM 40 MG: 40 TABLET, FILM COATED ORAL at 08:42

## 2021-01-01 RX ADMIN — PIPERACILLIN SODIUM AND TAZOBACTAM SODIUM 3.38 G: 3; .375 INJECTION, POWDER, LYOPHILIZED, FOR SOLUTION INTRAVENOUS at 03:12

## 2021-01-01 RX ADMIN — PIPERACILLIN SODIUM AND TAZOBACTAM SODIUM 3.38 G: 3; .375 INJECTION, POWDER, LYOPHILIZED, FOR SOLUTION INTRAVENOUS at 09:30

## 2021-01-01 RX ADMIN — Medication 10 ML: at 22:08

## 2021-01-01 RX ADMIN — HEPARIN SODIUM 5000 UNITS: 5000 INJECTION INTRAVENOUS; SUBCUTANEOUS at 00:10

## 2021-01-01 RX ADMIN — ATORVASTATIN CALCIUM 40 MG: 40 TABLET, FILM COATED ORAL at 09:00

## 2021-01-01 RX ADMIN — DILTIAZEM HYDROCHLORIDE 30 MG: 30 TABLET, FILM COATED ORAL at 16:52

## 2021-01-01 RX ADMIN — Medication: at 08:43

## 2021-01-01 RX ADMIN — FUROSEMIDE 40 MG: 40 TABLET ORAL at 09:01

## 2021-01-01 RX ADMIN — AMLODIPINE BESYLATE 5 MG: 5 TABLET ORAL at 09:29

## 2021-01-01 RX ADMIN — DILTIAZEM HYDROCHLORIDE 30 MG: 30 TABLET, FILM COATED ORAL at 06:46

## 2021-01-01 RX ADMIN — FUROSEMIDE 40 MG: 40 TABLET ORAL at 20:26

## 2021-01-01 RX ADMIN — FUROSEMIDE 40 MG: 40 TABLET ORAL at 09:03

## 2021-01-01 RX ADMIN — HEPARIN SODIUM 5000 UNITS: 5000 INJECTION INTRAVENOUS; SUBCUTANEOUS at 17:12

## 2021-01-01 RX ADMIN — BUDESONIDE AND FORMOTEROL FUMARATE DIHYDRATE 2 PUFF: 160; 4.5 AEROSOL RESPIRATORY (INHALATION) at 07:44

## 2021-01-01 RX ADMIN — PIPERACILLIN SODIUM AND TAZOBACTAM SODIUM 3.38 G: 3; .375 INJECTION, POWDER, LYOPHILIZED, FOR SOLUTION INTRAVENOUS at 03:35

## 2021-01-01 RX ADMIN — LOPERAMIDE HYDROCHLORIDE 2 MG: 2 CAPSULE ORAL at 21:15

## 2021-01-01 RX ADMIN — WHITE PETROLATUM,ZINC OXIDE: 57; 17 PASTE TOPICAL at 09:00

## 2021-01-01 RX ADMIN — WHITE PETROLATUM,ZINC OXIDE: 57; 17 PASTE TOPICAL at 21:17

## 2021-01-01 RX ADMIN — FUROSEMIDE 40 MG: 10 INJECTION, SOLUTION INTRAMUSCULAR; INTRAVENOUS at 09:23

## 2021-01-01 RX ADMIN — MAGNESIUM HYDROXIDE 30 ML: 400 SUSPENSION ORAL at 12:43

## 2021-01-01 RX ADMIN — HEPARIN SODIUM 5000 UNITS: 5000 INJECTION INTRAVENOUS; SUBCUTANEOUS at 08:54

## 2021-01-01 RX ADMIN — CALCITRIOL CAPSULES 0.25 MCG 0.25 MCG: 0.25 CAPSULE ORAL at 09:00

## 2021-01-01 RX ADMIN — WHITE PETROLATUM,ZINC OXIDE: 57; 17 PASTE TOPICAL at 09:05

## 2021-01-01 RX ADMIN — Medication: at 09:04

## 2021-01-01 RX ADMIN — WHITE PETROLATUM,ZINC OXIDE: 57; 17 PASTE TOPICAL at 21:07

## 2021-01-01 RX ADMIN — DILTIAZEM HYDROCHLORIDE 30 MG: 30 TABLET, FILM COATED ORAL at 15:44

## 2021-01-01 RX ADMIN — Medication 10 ML: at 21:18

## 2021-01-01 RX ADMIN — DILTIAZEM HYDROCHLORIDE 30 MG: 30 TABLET, FILM COATED ORAL at 11:46

## 2021-01-01 RX ADMIN — WHITE PETROLATUM,ZINC OXIDE: 57; 17 PASTE TOPICAL at 22:01

## 2021-01-01 RX ADMIN — HEPARIN SODIUM 5000 UNITS: 5000 INJECTION INTRAVENOUS; SUBCUTANEOUS at 00:22

## 2021-01-01 RX ADMIN — DILTIAZEM HYDROCHLORIDE 30 MG: 30 TABLET, FILM COATED ORAL at 09:07

## 2021-01-01 RX ADMIN — VANCOMYCIN HYDROCHLORIDE 1000 MG: 1 INJECTION, POWDER, LYOPHILIZED, FOR SOLUTION INTRAVENOUS at 12:58

## 2021-01-01 RX ADMIN — DILTIAZEM HYDROCHLORIDE 30 MG: 30 TABLET, FILM COATED ORAL at 09:37

## 2021-01-01 RX ADMIN — ENOXAPARIN SODIUM 30 MG: 30 INJECTION SUBCUTANEOUS at 12:56

## 2021-01-01 RX ADMIN — BUDESONIDE AND FORMOTEROL FUMARATE DIHYDRATE 2 PUFF: 160; 4.5 AEROSOL RESPIRATORY (INHALATION) at 21:51

## 2021-01-01 RX ADMIN — BUDESONIDE AND FORMOTEROL FUMARATE DIHYDRATE 2 PUFF: 160; 4.5 AEROSOL RESPIRATORY (INHALATION) at 08:40

## 2021-01-01 RX ADMIN — PIPERACILLIN SODIUM AND TAZOBACTAM SODIUM 3.38 G: 3; .375 INJECTION, POWDER, LYOPHILIZED, FOR SOLUTION INTRAVENOUS at 17:22

## 2021-01-01 RX ADMIN — FUROSEMIDE 40 MG: 10 INJECTION, SOLUTION INTRAMUSCULAR; INTRAVENOUS at 20:53

## 2021-01-01 RX ADMIN — PIPERACILLIN SODIUM AND TAZOBACTAM SODIUM 3.38 G: 3; .375 INJECTION, POWDER, LYOPHILIZED, FOR SOLUTION INTRAVENOUS at 12:43

## 2021-01-01 RX ADMIN — CALCITRIOL CAPSULES 0.25 MCG 0.25 MCG: 0.25 CAPSULE ORAL at 09:18

## 2021-01-01 RX ADMIN — FUROSEMIDE 40 MG: 10 INJECTION, SOLUTION INTRAMUSCULAR; INTRAVENOUS at 09:29

## 2021-01-01 RX ADMIN — EPOETIN ALFA-EPBX 10000 UNITS: 10000 INJECTION, SOLUTION INTRAVENOUS; SUBCUTANEOUS at 18:18

## 2021-01-01 RX ADMIN — Medication: at 09:00

## 2021-01-01 RX ADMIN — ATORVASTATIN CALCIUM 40 MG: 40 TABLET, FILM COATED ORAL at 09:24

## 2021-01-01 RX ADMIN — HEPARIN SODIUM 5000 UNITS: 5000 INJECTION INTRAVENOUS; SUBCUTANEOUS at 15:43

## 2021-01-01 RX ADMIN — WHITE PETROLATUM,ZINC OXIDE: 57; 17 PASTE TOPICAL at 16:44

## 2021-01-01 RX ADMIN — VANCOMYCIN HYDROCHLORIDE 750 MG: 750 INJECTION, POWDER, LYOPHILIZED, FOR SOLUTION INTRAVENOUS at 10:26

## 2021-01-01 RX ADMIN — WHITE PETROLATUM,ZINC OXIDE: 57; 17 PASTE TOPICAL at 22:08

## 2021-01-01 RX ADMIN — HEPARIN SODIUM 5000 UNITS: 5000 INJECTION INTRAVENOUS; SUBCUTANEOUS at 08:42

## 2021-01-01 RX ADMIN — PIPERACILLIN SODIUM AND TAZOBACTAM SODIUM 3.38 G: 3; .375 INJECTION, POWDER, LYOPHILIZED, FOR SOLUTION INTRAVENOUS at 18:16

## 2021-01-01 RX ADMIN — VANCOMYCIN HYDROCHLORIDE 750 MG: 750 INJECTION, POWDER, LYOPHILIZED, FOR SOLUTION INTRAVENOUS at 12:56

## 2021-01-01 RX ADMIN — HEPARIN SODIUM 5000 UNITS: 5000 INJECTION INTRAVENOUS; SUBCUTANEOUS at 08:46

## 2021-01-01 RX ADMIN — AMLODIPINE BESYLATE 5 MG: 5 TABLET ORAL at 09:11

## 2021-01-01 RX ADMIN — ASPIRIN 81 MG: 81 TABLET, COATED ORAL at 08:42

## 2021-01-01 RX ADMIN — HYDROXYZINE PAMOATE 25 MG: 25 CAPSULE ORAL at 21:15

## 2021-01-01 RX ADMIN — PIPERACILLIN SODIUM AND TAZOBACTAM SODIUM 3.38 G: 3; .375 INJECTION, POWDER, LYOPHILIZED, FOR SOLUTION INTRAVENOUS at 18:20

## 2021-01-01 RX ADMIN — ALBUMIN (HUMAN) 12.5 G: 0.25 INJECTION, SOLUTION INTRAVENOUS at 17:17

## 2021-01-01 RX ADMIN — METOPROLOL SUCCINATE 50 MG: 50 TABLET, EXTENDED RELEASE ORAL at 16:44

## 2021-01-01 RX ADMIN — DILTIAZEM HYDROCHLORIDE 30 MG: 30 TABLET, FILM COATED ORAL at 23:48

## 2021-01-01 RX ADMIN — HEPARIN SODIUM 5000 UNITS: 5000 INJECTION INTRAVENOUS; SUBCUTANEOUS at 09:24

## 2021-01-01 RX ADMIN — FUROSEMIDE 40 MG: 10 INJECTION, SOLUTION INTRAMUSCULAR; INTRAVENOUS at 08:42

## 2021-01-01 RX ADMIN — DILTIAZEM HYDROCHLORIDE 30 MG: 30 TABLET, FILM COATED ORAL at 05:46

## 2021-01-01 RX ADMIN — PIPERACILLIN SODIUM AND TAZOBACTAM SODIUM 3.38 G: 3; .375 INJECTION, POWDER, LYOPHILIZED, FOR SOLUTION INTRAVENOUS at 21:07

## 2021-01-01 RX ADMIN — HYDROCODONE BITARTRATE AND ACETAMINOPHEN 1 TABLET: 5; 325 TABLET ORAL at 23:48

## 2021-01-01 RX ADMIN — ACETAMINOPHEN 650 MG: 325 TABLET ORAL at 09:17

## 2021-01-01 RX ADMIN — FUROSEMIDE 40 MG: 10 INJECTION, SOLUTION INTRAMUSCULAR; INTRAVENOUS at 20:16

## 2021-01-01 RX ADMIN — DILTIAZEM HYDROCHLORIDE 30 MG: 30 TABLET, FILM COATED ORAL at 08:42

## 2021-01-01 RX ADMIN — PIPERACILLIN SODIUM AND TAZOBACTAM SODIUM 3.38 G: 3; .375 INJECTION, POWDER, LYOPHILIZED, FOR SOLUTION INTRAVENOUS at 16:29

## 2021-01-01 RX ADMIN — METOPROLOL SUCCINATE 50 MG: 50 TABLET, EXTENDED RELEASE ORAL at 09:03

## 2021-01-01 RX ADMIN — VANCOMYCIN HYDROCHLORIDE 750 MG: 750 INJECTION, POWDER, LYOPHILIZED, FOR SOLUTION INTRAVENOUS at 11:46

## 2021-01-01 RX ADMIN — PIPERACILLIN SODIUM AND TAZOBACTAM SODIUM 3.38 G: 3; .375 INJECTION, POWDER, LYOPHILIZED, FOR SOLUTION INTRAVENOUS at 02:49

## 2021-01-01 RX ADMIN — ATORVASTATIN CALCIUM 40 MG: 40 TABLET, FILM COATED ORAL at 08:54

## 2021-01-01 RX ADMIN — WHITE PETROLATUM,ZINC OXIDE: 57; 17 PASTE TOPICAL at 17:13

## 2021-01-01 RX ADMIN — Medication 10 ML: at 20:05

## 2021-01-01 RX ADMIN — PIPERACILLIN SODIUM AND TAZOBACTAM SODIUM 3.38 G: 3; .375 INJECTION, POWDER, LYOPHILIZED, FOR SOLUTION INTRAVENOUS at 11:54

## 2021-01-01 RX ADMIN — ASPIRIN 81 MG: 81 TABLET, COATED ORAL at 09:03

## 2021-01-01 RX ADMIN — Medication 10 ML: at 04:12

## 2021-01-01 RX ADMIN — FUROSEMIDE 40 MG: 40 TABLET ORAL at 09:18

## 2021-01-01 RX ADMIN — ACETAMINOPHEN 650 MG: 325 TABLET ORAL at 05:46

## 2021-01-01 RX ADMIN — FUROSEMIDE 40 MG: 10 INJECTION, SOLUTION INTRAMUSCULAR; INTRAVENOUS at 22:36

## 2021-01-01 RX ADMIN — Medication 10 ML: at 01:24

## 2021-01-01 RX ADMIN — HYDROCODONE BITARTRATE AND ACETAMINOPHEN 1 TABLET: 5; 325 TABLET ORAL at 10:57

## 2021-01-01 RX ADMIN — FUROSEMIDE 40 MG: 40 TABLET ORAL at 20:03

## 2021-01-01 RX ADMIN — Medication 10 ML: at 18:22

## 2021-01-01 RX ADMIN — Medication 10 ML: at 04:07

## 2021-01-01 RX ADMIN — Medication 10 ML: at 22:36

## 2021-01-01 RX ADMIN — FUROSEMIDE 40 MG: 10 INJECTION, SOLUTION INTRAMUSCULAR; INTRAVENOUS at 21:52

## 2021-01-01 RX ADMIN — LOPERAMIDE HYDROCHLORIDE 2 MG: 2 CAPSULE ORAL at 15:32

## 2021-01-01 RX ADMIN — HEPARIN SODIUM 5000 UNITS: 5000 INJECTION INTRAVENOUS; SUBCUTANEOUS at 23:34

## 2021-01-01 RX ADMIN — PIPERACILLIN SODIUM AND TAZOBACTAM SODIUM 3.38 G: 3; .375 INJECTION, POWDER, LYOPHILIZED, FOR SOLUTION INTRAVENOUS at 01:31

## 2021-01-01 RX ADMIN — ALBUMIN (HUMAN) 12.5 G: 0.25 INJECTION, SOLUTION INTRAVENOUS at 08:47

## 2021-01-01 RX ADMIN — ATORVASTATIN CALCIUM 40 MG: 40 TABLET, FILM COATED ORAL at 09:03

## 2021-01-01 RX ADMIN — WHITE PETROLATUM,ZINC OXIDE: 57; 17 PASTE TOPICAL at 22:00

## 2021-01-01 RX ADMIN — ASPIRIN 81 MG: 81 TABLET, COATED ORAL at 08:45

## 2021-01-01 RX ADMIN — HEPARIN SODIUM 5000 UNITS: 5000 INJECTION INTRAVENOUS; SUBCUTANEOUS at 16:52

## 2021-01-01 RX ADMIN — BUDESONIDE AND FORMOTEROL FUMARATE DIHYDRATE 2 PUFF: 160; 4.5 AEROSOL RESPIRATORY (INHALATION) at 21:17

## 2021-01-01 RX ADMIN — ONDANSETRON 4 MG: 2 INJECTION INTRAMUSCULAR; INTRAVENOUS at 21:49

## 2021-01-01 RX ADMIN — PIPERACILLIN SODIUM AND TAZOBACTAM SODIUM 3.38 G: 3; .375 INJECTION, POWDER, LYOPHILIZED, FOR SOLUTION INTRAVENOUS at 04:05

## 2021-01-01 RX ADMIN — FUROSEMIDE 40 MG: 10 INJECTION, SOLUTION INTRAMUSCULAR; INTRAVENOUS at 09:11

## 2021-01-01 RX ADMIN — PIPERACILLIN SODIUM AND TAZOBACTAM SODIUM 3.38 G: 3; .375 INJECTION, POWDER, LYOPHILIZED, FOR SOLUTION INTRAVENOUS at 17:07

## 2021-01-01 RX ADMIN — ATORVASTATIN CALCIUM 40 MG: 40 TABLET, FILM COATED ORAL at 09:17

## 2021-01-01 RX ADMIN — PIPERACILLIN SODIUM AND TAZOBACTAM SODIUM 3.38 G: 3; .375 INJECTION, POWDER, LYOPHILIZED, FOR SOLUTION INTRAVENOUS at 21:10

## 2021-01-01 RX ADMIN — AMLODIPINE BESYLATE 5 MG: 5 TABLET ORAL at 08:45

## 2021-01-01 RX ADMIN — ASPIRIN 81 MG: 81 TABLET, COATED ORAL at 08:54

## 2021-01-01 RX ADMIN — PIPERACILLIN SODIUM AND TAZOBACTAM SODIUM 3.38 G: 3; .375 INJECTION, POWDER, LYOPHILIZED, FOR SOLUTION INTRAVENOUS at 03:29

## 2021-01-01 RX ADMIN — BUDESONIDE AND FORMOTEROL FUMARATE DIHYDRATE 2 PUFF: 160; 4.5 AEROSOL RESPIRATORY (INHALATION) at 08:45

## 2021-01-01 RX ADMIN — ALBUMIN (HUMAN) 12.5 G: 0.25 INJECTION, SOLUTION INTRAVENOUS at 04:05

## 2021-01-01 RX ADMIN — PIPERACILLIN SODIUM AND TAZOBACTAM SODIUM 3.38 G: 3; .375 INJECTION, POWDER, LYOPHILIZED, FOR SOLUTION INTRAVENOUS at 09:11

## 2021-01-01 RX ADMIN — BUDESONIDE AND FORMOTEROL FUMARATE DIHYDRATE 2 PUFF: 160; 4.5 AEROSOL RESPIRATORY (INHALATION) at 20:25

## 2021-01-01 RX ADMIN — PIPERACILLIN SODIUM AND TAZOBACTAM SODIUM 3.38 G: 3; .375 INJECTION, POWDER, LYOPHILIZED, FOR SOLUTION INTRAVENOUS at 10:26

## 2021-01-01 RX ADMIN — Medication 10 ML: at 20:10

## 2021-01-01 RX ADMIN — Medication 10 ML: at 14:47

## 2021-01-01 RX ADMIN — DILTIAZEM HYDROCHLORIDE 30 MG: 30 TABLET, FILM COATED ORAL at 12:51

## 2021-01-01 RX ADMIN — DILTIAZEM HYDROCHLORIDE 30 MG: 30 TABLET, FILM COATED ORAL at 16:21

## 2021-01-01 RX ADMIN — Medication 10 ML: at 15:03

## 2021-01-01 RX ADMIN — WHITE PETROLATUM,ZINC OXIDE: 57; 17 PASTE TOPICAL at 17:00

## 2021-01-01 RX ADMIN — DILTIAZEM HYDROCHLORIDE 30 MG: 30 TABLET, FILM COATED ORAL at 12:05

## 2021-01-01 RX ADMIN — PANTOPRAZOLE SODIUM 40 MG: 40 TABLET, DELAYED RELEASE ORAL at 15:00

## 2021-01-01 RX ADMIN — WHITE PETROLATUM,ZINC OXIDE: 57; 17 PASTE TOPICAL at 22:36

## 2021-01-01 RX ADMIN — BUDESONIDE AND FORMOTEROL FUMARATE DIHYDRATE 2 PUFF: 160; 4.5 AEROSOL RESPIRATORY (INHALATION) at 09:13

## 2021-01-01 RX ADMIN — BUDESONIDE AND FORMOTEROL FUMARATE DIHYDRATE 2 PUFF: 160; 4.5 AEROSOL RESPIRATORY (INHALATION) at 19:42

## 2021-01-01 RX ADMIN — BUDESONIDE AND FORMOTEROL FUMARATE DIHYDRATE 2 PUFF: 160; 4.5 AEROSOL RESPIRATORY (INHALATION) at 07:58

## 2021-01-01 RX ADMIN — BUDESONIDE AND FORMOTEROL FUMARATE DIHYDRATE 2 PUFF: 160; 4.5 AEROSOL RESPIRATORY (INHALATION) at 19:47

## 2021-01-01 RX ADMIN — WHITE PETROLATUM,ZINC OXIDE: 57; 17 PASTE TOPICAL at 08:43

## 2021-01-01 RX ADMIN — METOPROLOL SUCCINATE 50 MG: 50 TABLET, EXTENDED RELEASE ORAL at 08:54

## 2021-01-01 RX ADMIN — FUROSEMIDE 40 MG: 40 TABLET ORAL at 09:00

## 2021-01-01 RX ADMIN — HEPARIN SODIUM 5000 UNITS: 5000 INJECTION INTRAVENOUS; SUBCUTANEOUS at 16:44

## 2021-01-01 RX ADMIN — HYDROCODONE BITARTRATE AND ACETAMINOPHEN 1 TABLET: 5; 325 TABLET ORAL at 15:32

## 2021-01-01 RX ADMIN — HYDROXYZINE PAMOATE 25 MG: 25 CAPSULE ORAL at 10:57

## 2021-01-01 RX ADMIN — IPRATROPIUM BROMIDE AND ALBUTEROL SULFATE 3 ML: .5; 2.5 SOLUTION RESPIRATORY (INHALATION) at 01:04

## 2021-01-01 RX ADMIN — WHITE PETROLATUM,ZINC OXIDE: 57; 17 PASTE TOPICAL at 20:55

## 2021-01-01 RX ADMIN — ENOXAPARIN SODIUM 30 MG: 30 INJECTION SUBCUTANEOUS at 09:11

## 2021-01-01 RX ADMIN — Medication 10 ML: at 21:17

## 2021-01-01 RX ADMIN — FUROSEMIDE 100 MG: 10 INJECTION, SOLUTION INTRAVENOUS at 09:59

## 2021-01-01 RX ADMIN — PIPERACILLIN SODIUM AND TAZOBACTAM SODIUM 3.38 G: 3; .375 INJECTION, POWDER, LYOPHILIZED, FOR SOLUTION INTRAVENOUS at 11:46

## 2021-01-01 RX ADMIN — IPRATROPIUM BROMIDE AND ALBUTEROL SULFATE 3 ML: .5; 2.5 SOLUTION RESPIRATORY (INHALATION) at 08:40

## 2021-01-01 RX ADMIN — WHITE PETROLATUM,ZINC OXIDE: 57; 17 PASTE TOPICAL at 09:47

## 2021-01-01 RX ADMIN — Medication 10 ML: at 06:52

## 2021-01-01 RX ADMIN — DILTIAZEM HYDROCHLORIDE 30 MG: 30 TABLET, FILM COATED ORAL at 17:13

## 2021-01-01 RX ADMIN — ALBUMIN (HUMAN) 12.5 G: 0.25 INJECTION, SOLUTION INTRAVENOUS at 00:03

## 2021-01-01 RX ADMIN — PREDNISONE 40 MG: 20 TABLET ORAL at 08:45

## 2021-01-01 RX ADMIN — PIPERACILLIN SODIUM AND TAZOBACTAM SODIUM 3.38 G: 3; .375 INJECTION, POWDER, LYOPHILIZED, FOR SOLUTION INTRAVENOUS at 04:55

## 2021-01-01 RX ADMIN — PIPERACILLIN SODIUM AND TAZOBACTAM SODIUM 3.38 G: 3; .375 INJECTION, POWDER, LYOPHILIZED, FOR SOLUTION INTRAVENOUS at 11:00

## 2021-01-01 RX ADMIN — Medication 10 ML: at 16:57

## 2021-01-01 RX ADMIN — PIPERACILLIN SODIUM AND TAZOBACTAM SODIUM 3.38 G: 3; .375 INJECTION, POWDER, LYOPHILIZED, FOR SOLUTION INTRAVENOUS at 12:51

## 2021-01-01 RX ADMIN — WHITE PETROLATUM,ZINC OXIDE: 57; 17 PASTE TOPICAL at 20:35

## 2021-01-01 RX ADMIN — FUROSEMIDE 40 MG: 40 TABLET ORAL at 20:34

## 2021-01-01 RX ADMIN — ALBUMIN (HUMAN) 12.5 G: 0.25 INJECTION, SOLUTION INTRAVENOUS at 14:16

## 2021-01-01 RX ADMIN — Medication: at 08:57

## 2021-01-01 RX ADMIN — Medication 10 ML: at 22:00

## 2021-01-01 RX ADMIN — FUROSEMIDE 40 MG: 40 TABLET ORAL at 21:13

## 2021-01-01 RX ADMIN — Medication 100 MG: at 20:04

## 2021-01-01 RX ADMIN — PIPERACILLIN SODIUM AND TAZOBACTAM SODIUM 3.38 G: 3; .375 INJECTION, POWDER, LYOPHILIZED, FOR SOLUTION INTRAVENOUS at 21:13

## 2021-01-01 RX ADMIN — SODIUM CHLORIDE 750 MG: 9 INJECTION, SOLUTION INTRAVENOUS at 12:00

## 2021-01-01 RX ADMIN — Medication 10 ML: at 20:54

## 2021-01-01 RX ADMIN — METOPROLOL SUCCINATE 50 MG: 50 TABLET, EXTENDED RELEASE ORAL at 09:24

## 2021-01-01 RX ADMIN — Medication 100 MG: at 10:07

## 2021-01-01 RX ADMIN — ACETAMINOPHEN 650 MG: 325 TABLET ORAL at 21:13

## 2021-01-01 RX ADMIN — FUROSEMIDE 40 MG: 40 TABLET ORAL at 21:15

## 2021-01-01 RX ADMIN — ATORVASTATIN CALCIUM 40 MG: 40 TABLET, FILM COATED ORAL at 08:45

## 2021-01-01 RX ADMIN — PIPERACILLIN SODIUM AND TAZOBACTAM SODIUM 4.5 G: 4; .5 INJECTION, POWDER, LYOPHILIZED, FOR SOLUTION INTRAVENOUS at 09:59

## 2021-01-01 RX ADMIN — BUDESONIDE AND FORMOTEROL FUMARATE DIHYDRATE 2 PUFF: 160; 4.5 AEROSOL RESPIRATORY (INHALATION) at 09:01

## 2021-01-01 RX ADMIN — SODIUM CHLORIDE 250 ML: 9 INJECTION, SOLUTION INTRAVENOUS at 10:09

## 2021-01-01 RX ADMIN — Medication 100 MG: at 09:00

## 2021-01-01 RX ADMIN — ALBUMIN (HUMAN) 12.5 G: 0.25 INJECTION, SOLUTION INTRAVENOUS at 20:16

## 2021-01-01 RX ADMIN — PIPERACILLIN SODIUM AND TAZOBACTAM SODIUM 3.38 G: 3; .375 INJECTION, POWDER, LYOPHILIZED, FOR SOLUTION INTRAVENOUS at 02:27

## 2021-01-01 RX ADMIN — HEPARIN SODIUM 5000 UNITS: 5000 INJECTION INTRAVENOUS; SUBCUTANEOUS at 00:02

## 2021-01-01 RX ADMIN — BUDESONIDE AND FORMOTEROL FUMARATE DIHYDRATE 2 PUFF: 160; 4.5 AEROSOL RESPIRATORY (INHALATION) at 19:35

## 2021-01-01 RX ADMIN — ATORVASTATIN CALCIUM 40 MG: 40 TABLET, FILM COATED ORAL at 09:29

## 2021-01-01 RX ADMIN — ATORVASTATIN CALCIUM 40 MG: 40 TABLET, FILM COATED ORAL at 09:11

## 2021-01-01 RX ADMIN — PIPERACILLIN SODIUM AND TAZOBACTAM SODIUM 3.38 G: 3; .375 INJECTION, POWDER, LYOPHILIZED, FOR SOLUTION INTRAVENOUS at 02:39

## 2021-01-01 RX ADMIN — WHITE PETROLATUM,ZINC OXIDE: 57; 17 PASTE TOPICAL at 15:44

## 2021-01-01 RX ADMIN — BUDESONIDE AND FORMOTEROL FUMARATE DIHYDRATE 2 PUFF: 160; 4.5 AEROSOL RESPIRATORY (INHALATION) at 20:26

## 2021-01-01 RX ADMIN — PIPERACILLIN SODIUM AND TAZOBACTAM SODIUM 3.38 G: 3; .375 INJECTION, POWDER, LYOPHILIZED, FOR SOLUTION INTRAVENOUS at 02:38

## 2021-01-01 RX ADMIN — FUROSEMIDE 40 MG: 40 TABLET ORAL at 21:57

## 2021-01-01 RX ADMIN — PIPERACILLIN SODIUM AND TAZOBACTAM SODIUM 3.38 G: 3; .375 INJECTION, POWDER, LYOPHILIZED, FOR SOLUTION INTRAVENOUS at 20:34

## 2021-01-01 RX ADMIN — METOPROLOL SUCCINATE 50 MG: 50 TABLET, EXTENDED RELEASE ORAL at 08:42

## 2021-01-01 RX ADMIN — ASPIRIN 81 MG: 81 TABLET, COATED ORAL at 09:11

## 2021-01-01 RX ADMIN — PIPERACILLIN SODIUM AND TAZOBACTAM SODIUM 3.38 G: 3; .375 INJECTION, POWDER, LYOPHILIZED, FOR SOLUTION INTRAVENOUS at 03:30

## 2021-01-01 RX ADMIN — PIPERACILLIN SODIUM AND TAZOBACTAM SODIUM 3.38 G: 3; .375 INJECTION, POWDER, LYOPHILIZED, FOR SOLUTION INTRAVENOUS at 20:04

## 2021-01-01 RX ADMIN — Medication 10 ML: at 21:07

## 2021-01-01 RX ADMIN — BUDESONIDE AND FORMOTEROL FUMARATE DIHYDRATE 2 PUFF: 160; 4.5 AEROSOL RESPIRATORY (INHALATION) at 20:32

## 2021-01-01 RX ADMIN — CALCITRIOL CAPSULES 0.25 MCG 0.25 MCG: 0.25 CAPSULE ORAL at 09:01

## 2021-01-01 RX ADMIN — IRON SUCROSE 200 MG: 20 INJECTION, SOLUTION INTRAVENOUS at 17:08

## 2021-01-01 RX ADMIN — Medication 10 ML: at 21:53

## 2021-01-01 RX ADMIN — HEPARIN SODIUM 5000 UNITS: 5000 INJECTION INTRAVENOUS; SUBCUTANEOUS at 16:29

## 2021-01-01 RX ADMIN — Medication 10 ML: at 06:27

## 2021-01-01 RX ADMIN — IPRATROPIUM BROMIDE AND ALBUTEROL SULFATE 3 ML: .5; 2.5 SOLUTION RESPIRATORY (INHALATION) at 19:18

## 2021-01-01 RX ADMIN — BUDESONIDE AND FORMOTEROL FUMARATE DIHYDRATE 2 PUFF: 160; 4.5 AEROSOL RESPIRATORY (INHALATION) at 10:17

## 2021-01-01 RX ADMIN — ASPIRIN 81 MG: 81 TABLET, COATED ORAL at 09:29

## 2021-01-01 RX ADMIN — PIPERACILLIN SODIUM AND TAZOBACTAM SODIUM 3.38 G: 3; .375 INJECTION, POWDER, LYOPHILIZED, FOR SOLUTION INTRAVENOUS at 18:18

## 2021-01-01 RX ADMIN — Medication 10 ML: at 06:46

## 2021-01-01 RX ADMIN — SODIUM CHLORIDE 250 ML: 9 INJECTION, SOLUTION INTRAVENOUS at 11:54

## 2021-01-01 RX ADMIN — DILTIAZEM HYDROCHLORIDE 30 MG: 30 TABLET, FILM COATED ORAL at 23:34

## 2021-01-01 RX ADMIN — BUDESONIDE AND FORMOTEROL FUMARATE DIHYDRATE 2 PUFF: 160; 4.5 AEROSOL RESPIRATORY (INHALATION) at 19:41

## 2021-01-01 RX ADMIN — WHITE PETROLATUM,ZINC OXIDE: 57; 17 PASTE TOPICAL at 09:28

## 2021-01-01 RX ADMIN — Medication 100 MG: at 20:34

## 2021-01-01 RX ADMIN — PREDNISONE 40 MG: 20 TABLET ORAL at 09:00

## 2021-01-01 RX ADMIN — ASPIRIN 81 MG: 81 TABLET, COATED ORAL at 09:24

## 2021-01-01 RX ADMIN — FUROSEMIDE 40 MG: 40 TABLET ORAL at 08:54

## 2021-01-01 RX ADMIN — WHITE PETROLATUM,ZINC OXIDE: 57; 17 PASTE TOPICAL at 16:57

## 2021-01-01 RX ADMIN — POLYETHYLENE GLYCOL 3350 17 G: 17 POWDER, FOR SOLUTION ORAL at 10:08

## 2021-01-01 RX ADMIN — Medication: at 08:46

## 2021-01-01 RX ADMIN — WHITE PETROLATUM,ZINC OXIDE: 57; 17 PASTE TOPICAL at 08:47

## 2021-10-21 PROBLEM — S91.309A MULTIPLE OPEN WOUNDS OF FOOT: Status: ACTIVE | Noted: 2021-01-01

## 2021-10-21 NOTE — PROGRESS NOTES
Consult for Vancomycin Dosing by Pharmacy by Rodriguez Longoria NP    Consult provided for this [de-identified]y.o. year old male , for indication of SSTI    Day of Therapy: 01  Goal of Level(s): trough = 10-15    Other Current Antibiotics: Zosyn    Significant Cultures:        Results       Procedure Component Value Units Date/Time    CULTURE, Sixto Martinez STAIN [[id number]]     Order Status: Sent Specimen: Wound from Foot     CULTURE, BLOOD, PAIRED [[id number]] Collected: 10/21/21 0920    Order Status: Completed Specimen: Blood Updated: 10/21/21 0943            Serum Creatinine Creatinine   Date Value Ref Range Status   10/21/2021 2.83 (H) 0.70 - 1.30 mg/dL Final      Creatinine Clearance Estimated Creatinine Clearance: 27.1 mL/min (A) (based on SCr of 2.83 mg/dL (H)). BUN Lab Results   Component Value Date/Time    BUN 47 (H) 10/21/2021 09:20 AM      WBC Lab Results   Component Value Date/Time    WBC 10.2 10/21/2021 09:20 AM      Temp Temp Readings from Last 1 Encounters:   10/21/21 98.7 °F (37.1 °C)            New Regimen:   Patient has MAURILIO. Scheduled Vanc 750 mg x1 today and Random for tomorrow AM.    Pharmacy to follow daily and will make changes to dose and/or frequency based on clinical status.   _________________________________     Pharmacist Rosemarie Miller PHARMD

## 2021-10-21 NOTE — PROGRESS NOTES
Additional skin assessment note. Skin assessment done by this RN with Chilango Kim RN. Patient has a skin tear on the left arm - gauze dressing applied; MASD on the abdominal fold and left inguinal area, non-blanchable redness on the right buttocks and left posterior upper thigh, dry wound on bilateral lower extremities, draining wounds in between toes - dressing applied, dry and flaky skin on bilateral lower extremities. No other skin issues noted. Wound care nurse consulted. Wound culture done.

## 2021-10-21 NOTE — ED PROVIDER NOTES
EMERGENCY DEPARTMENT HISTORY AND PHYSICAL EXAM 
 
 
Date: 10/21/2021 Patient Name: Chelsey Conroy History of Presenting Illness Chief Complaint Patient presents with  Wound Check  Peripheral Edema History Provided By: Patient HPI: Chelsey Conroy, [de-identified] y.o. male with a past medical history significant Lymphedema presents to the ED with cc of wound check and peripheral edema. Called EMS for right lower leg pain. Upon EMS arrival his O2 sats was in the 70s with crackles bilateral lower extremities. Patient improved on 15 L via nonrebreather. Patient will be brought to the ER for further evaluation. Moderate severity, no known exacerbating or relieving factors, no other associated signs and symptoms There are no other complaints, changes, or physical findings at this time. PCP: No primary care provider on file. No current facility-administered medications on file prior to encounter. Current Outpatient Medications on File Prior to Encounter Medication Sig Dispense Refill  aspirin delayed-release 81 mg tablet Take  by mouth daily.  atorvastatin (LIPITOR) 40 mg tablet Take  by mouth daily.  hydroCHLOROthiazide (HYDRODIURIL) 25 mg tablet Take 25 mg by mouth daily.  albuterol-ipratropium (DUO-NEB) 2.5 mg-0.5 mg/3 ml nebu 3 mL by Nebulization route.  mv-mn/folic [id number] (ANTHONY MULTIVITAMIN FOR MEN PO) Take  by mouth. Past History Past Medical History: 
Past Medical History:  
Diagnosis Date  High cholesterol  Hx of long term use of blood thinners   
 aspirin  Hypertension  Lymphedema  Vascular disease Past Surgical History: 
Past Surgical History:  
Procedure Laterality Date  HX OTHER SURGICAL    
 repair of tendon achilles  HX OTHER SURGICAL  2012  
 aortic anrysm 6/> cm diamater Family History: 
Family History Problem Relation Age of Onset  Hypertension Mother Social History: 
Social History Tobacco Use  Smoking status: Former Smoker Packs/day: 2.00 Years: 50.00 Pack years: 100.00 Quit date:  Years since quittin.8  Smokeless tobacco: Never Used Substance Use Topics  Alcohol use: Not on file  Drug use: Never Allergies: 
No Known Allergies Review of Systems Review of Systems Constitutional: Positive for fatigue. Negative for chills and fever. HENT: Negative for congestion and dental problem. Eyes: Negative for pain and discharge. Respiratory: Positive for shortness of breath. Negative for apnea. Cardiovascular: Positive for leg swelling. Negative for chest pain. Gastrointestinal: Negative for abdominal pain. Endocrine: Negative for polydipsia and polyphagia. Genitourinary: Negative for discharge, flank pain, penile pain and testicular pain. Neurological: Negative for dizziness. Psychiatric/Behavioral: Negative for agitation and suicidal ideas. Physical Exam  
 
Physical Exam 
Constitutional:   
   General: He is not in acute distress. Appearance: Normal appearance. He is not ill-appearing or toxic-appearing. HENT:  
   Head: Normocephalic and atraumatic. Nose: Nose normal.  
   Mouth/Throat:  
   Mouth: Mucous membranes are moist.  
Eyes:  
   Extraocular Movements: Extraocular movements intact. Pupils: Pupils are equal, round, and reactive to light. Cardiovascular:  
   Rate and Rhythm: Normal rate and regular rhythm. Pulmonary:  
   Effort: Pulmonary effort is normal.  
   Breath sounds: Normal breath sounds. Abdominal:  
   General: Bowel sounds are normal.  
Musculoskeletal:     
   General: Normal range of motion. Cervical back: Normal range of motion and neck supple. Skin: 
   General: Skin is warm and dry. Capillary Refill: Capillary refill takes less than 2 seconds. Neurological:  
   General: No focal deficit present.   
   Mental Status: He is alert and oriented to person, place, and time. Psychiatric:     
   Mood and Affect: Mood normal.     
   Behavior: Behavior normal.  
 
 
 
 
 
 
 
 
 
 
 
Lab and Diagnostic Study Results Labs - No results found for this or any previous visit (from the past 12 hour(s)). Radiologic Studies -  
@lastxrresult@ CT Results  (Last 48 hours) None CXR Results  (Last 48 hours) None Medical Decision Making - I am the first provider for this patient. - I reviewed the vital signs, available nursing notes, past medical history, past surgical history, family history and social history. - Initial assessment performed. The patients presenting problems have been discussed, and they are in agreement with the care plan formulated and outlined with them. I have encouraged them to ask questions as they arise throughout their visit. Vital Signs-Reviewed the patient's vital signs. Patient Vitals for the past 12 hrs: 
 Temp Pulse Resp BP SpO2  
10/21/21 0826 98.7 °F (37.1 °C) 66 19 (!) 171/75 92 % Records Reviewed:  
 
 
 
 
 
 
ED Course:  
 
  
 
Provider Notes (Medical Decision Making): 
Patient presents with multiple wounds on his right lower extremities. Patient has weeping edema to bilateral lower extremities. Differential diagnosis include PAD, unhealing ulcers, lymphedema, chronic cellulitis, sepsis. Procedures Medical Decision Makingedical Decision Making Performed by: Aileen Murphy NP 
PROCEDURES: 
Procedures Disposition Disposition:  
 
 
 
DISCHARGE PLAN: 
1. Current Discharge Medication List  
  
CONTINUE these medications which have NOT CHANGED Details  
aspirin delayed-release 81 mg tablet Take  by mouth daily. atorvastatin (LIPITOR) 40 mg tablet Take  by mouth daily. hydroCHLOROthiazide (HYDRODIURIL) 25 mg tablet Take 25 mg by mouth daily. albuterol-ipratropium (DUO-NEB) 2.5 mg-0.5 mg/3 ml nebu 3 mL by Nebulization route.   
  
mv-mn/folic acid/lutein/[id number] (ANTHONY MULTIVITAMIN FOR MEN PO) Take  by mouth. 2.  
Follow-up Information None 3. Return to ED if worse 4. Current Discharge Medication List  
  
 
 
 
Diagnosis Clinical Impression: 1. Penetrating wound of left foot, initial encounter 2. Hypoxia Attestations: 
 
Lynda Lyons NP Please note that this dictation was completed with [employer], the computer voice recognition software. Quite often unanticipated grammatical, syntax, homophones, and other interpretive errors are inadvertently transcribed by the computer software. Please disregard these errors. Please excuse any errors that have escaped final proofreading. Thank you.

## 2021-10-21 NOTE — ED TRIAGE NOTES
Pt Called EMS for lower leg pain. Pt with extensive wounds and weeping edema to bilateral lower extremities. Upon ems arrival pt's O2 saturations in the \"70's\" with crackles in bilateral lung bases.  Improved on 15LPM NRB

## 2021-10-21 NOTE — PROGRESS NOTES
Problem: Falls - Risk of  Goal: *Absence of Falls  Description: Document Elisha Rollins Fall Risk and appropriate interventions in the flowsheet.   Outcome: Progressing Towards Goal  Note: Fall Risk Interventions:            Medication Interventions: Patient to call before getting OOB, Bed/chair exit alarm, Teach patient to arise slowly    Elimination Interventions: Bed/chair exit alarm, Call light in reach, Patient to call for help with toileting needs

## 2021-10-21 NOTE — H&P
History and Physical    Patient: Lala Canales MRN: [id number]  SSN: [id number]    YOB: 1940  Age: [de-identified] y.o. Sex: male      Subjective:      Lala Canales is a [de-identified] y.o. male with a PMHx significant for chronic lymphedema, PVD, hypertension, and hypercholesterolemia presenting to the ED with a primary complaint of bilateral foot wound checks and lower extremity edema. Patient called EMS earlier for bilateral lower extremity pain. Upon EMS arrival, they noted his oxygen saturations were in the 70s. He was initially placed on 15 L nonrebreather with improvement. In the ED, he was transitioned to 3L/min nasal cannula oxygen. Significant labs showing BUN 47, creatinine 2.83, and BNP > 35,000. Chest x-ray showing pulmonary vascular congestion without overt pulmonary edema as well as left basilar airspace disease most likely atelectasis. Started on IV diuresis. Podiatry consulted for bilateral foot wounds. Wound care consult. Patient states that he does not take his Lasix at home because it makes him urinate too much. We will start on IV diuresis. Received IV vancomycin x1 in the ED. Started on IV Zosyn for broad spectrum coverage.      Past Medical History:   Diagnosis Date    High cholesterol     Hx of long term use of blood thinners     aspirin    Hypertension     Lymphedema     Vascular disease      Past Surgical History:   Procedure Laterality Date    HX OTHER SURGICAL      repair of tendon achilles    HX OTHER SURGICAL  2012    aortic anrysm 6/> cm diamater      Family History   Problem Relation Age of Onset    Hypertension Mother      Social History     Tobacco Use    Smoking status: Former Smoker     Packs/day: 2.00     Years: 50.00     Pack years: 100.00     Quit date:      Years since quittin.8    Smokeless tobacco: Never Used   Substance Use Topics    Alcohol use: Not on file      Prior to Admission medications    Medication Sig Start Date End Date Taking? Authorizing Provider   aspirin delayed-release 81 mg tablet Take  by mouth daily. Provider, Historical   atorvastatin (LIPITOR) 40 mg tablet Take  by mouth daily. Provider, Historical   hydroCHLOROthiazide (HYDRODIURIL) 25 mg tablet Take 25 mg by mouth daily. Provider, Historical   albuterol-ipratropium (DUO-NEB) 2.5 mg-0.5 mg/3 ml nebu 3 mL by Nebulization route. Provider, Historical   mv-mn/folic [id number] (ANTHONY MULTIVITAMIN FOR MEN PO) Take  by mouth. Provider, Historical        No Known Allergies    Review of Systems:  Constitutional: No fevers, No chills, No fatigue, No weakness  Eyes: No visual disturbance  ENT: No nasal congestion, No sore throat  Respiratory: No cough, No sputum, No wheezing, + SOB  Cardiovascular: No chest pain, + lower extremity edema, No Palpitations   Gastrointestinal: No nausea, No vomiting, No diarrhea, No constipation, No abdominal pain  Genitourinary: No frequency, No dysuria, No hematuria  Integument/Breast: + bilateral leg wounds, + foot wounds, No rash  Musculoskeletal: No arthralgias, No neck pain, No back pain  Neurological: No headaches, No dizziness, No confusion,  No seizures  Behavioral/Psychiatric: No anxiety, No depression      Objective:     Vitals:    10/21/21 0826 10/21/21 0959 10/21/21 1236   BP: (!) 171/75 (!) 182/82 (!) 157/61   Pulse: 66 63 (!) 56   Resp: 19 20 20   Temp: 98.7 °F (37.1 °C)  98.3 °F (36.8 °C)   SpO2: 92% 92% 94%   Weight: 113.4 kg (250 lb)     Height: 6' (1.829 m)          Physical Exam:  General: Obese  male. Alert, cooperative, no distress  Eye: conjunctivae/corneas clear. PERRL, EOM's intact. Throat and Neck: normal and no erythema or exudates noted. No mass   Lung: Symmetric chest wall expansion. Decreased air entry at bases with few rales. On 3L/min nasal cannula. No wheezing. Heart: regular rate and rhythm, positive S1/S2. No murmur, gallop, click, rub. No JVD. Abdomen: soft, non-tender.  Bowel sounds normal. No masses,  Extremities: 2+ pitting edema bilateral lower extremities. Able to move all extremities normal, atraumatic  Skin: Chronic vascular changes to lower extremities. Lesion anterior left lower extremity with weeping. Crusting on feet. Neurologic: AOx3. Cranial nerves 2-12 and sensation grossly intact. Psychiatric: non focal    Recent Results (from the past 24 hour(s))   LACTIC ACID    Collection Time: 10/21/21  9:20 AM   Result Value Ref Range    Lactic acid 0.9 0.4 - 2.0 mmol/L   METABOLIC PANEL, COMPREHENSIVE    Collection Time: 10/21/21  9:20 AM   Result Value Ref Range    Sodium 143 136 - 145 mmol/L    Potassium 5.1 3.5 - 5.1 mmol/L    Chloride 111 (H) 97 - 108 mmol/L    CO2 28 21 - 32 mmol/L    Anion gap 4 (L) 5 - 15 mmol/L    Glucose 103 (H) 65 - 100 mg/dL    BUN 47 (H) 6 - 20 mg/dL    Creatinine 2.83 (H) 0.70 - 1.30 mg/dL    BUN/Creatinine ratio 17 12 - 20      GFR est AA 26 (L) >60 ml/min/1.73m2    GFR est non-AA 22 (L) >60 ml/min/1.73m2    Calcium 8.5 8.5 - 10.1 mg/dL    Bilirubin, total 0.6 0.2 - 1.0 mg/dL    AST (SGOT) 37 15 - 37 U/L    ALT (SGPT) 24 12 - 78 U/L    Alk. phosphatase 119 (H) 45 - 117 U/L    Protein, total 8.2 6.4 - 8.2 g/dL    Albumin 2.3 (L) 3.5 - 5.0 g/dL    Globulin 5.9 (H) 2.0 - 4.0 g/dL    A-G Ratio 0.4 (L) 1.1 - 2.2     CBC WITH AUTOMATED DIFF    Collection Time: 10/21/21  9:20 AM   Result Value Ref Range    WBC 10.2 4.1 - 11.1 K/uL    RBC 4.08 (L) 4.10 - 5.70 M/uL    HGB 11.7 (L) 12.1 - 17.0 g/dL    HCT 39.1 36.6 - 50.3 %    MCV 95.8 80.0 - 99.0 FL    MCH 28.7 26.0 - 34.0 PG    MCHC 29.9 (L) 30.0 - 36.5 g/dL    RDW 15.0 (H) 11.5 - 14.5 %    PLATELET 565 807 - 917 K/uL    MPV 11.2 8.9 - 12.9 FL    NRBC 0.0 0.0  WBC    ABSOLUTE NRBC 0.00 0.00 - 0.01 K/uL    NEUTROPHILS 82 (H) 32 - 75 %    LYMPHOCYTES 10 (L) 12 - 49 %    MONOCYTES 6 5 - 13 %    EOSINOPHILS 0 0 - 7 %    BASOPHILS 1 0 - 1 %    IMMATURE GRANULOCYTES 1 (H) 0 - 0.5 %    ABS.  NEUTROPHILS 8.4 (H) 1.8 - 8.0 K/UL    ABS. LYMPHOCYTES 1.0 0.8 - 3.5 K/UL    ABS. MONOCYTES 0.6 0.0 - 1.0 K/UL    ABS. EOSINOPHILS 0.0 0.0 - 0.4 K/UL    ABS. BASOPHILS 0.1 0.0 - 0.1 K/UL    ABS. IMM. GRANS. 0.1 (H) 0.00 - 0.04 K/UL    DF AUTOMATED     LIPASE    Collection Time: 10/21/21  9:20 AM   Result Value Ref Range    Lipase 121 73 - 393 U/L   MAGNESIUM    Collection Time: 10/21/21  9:20 AM   Result Value Ref Range    Magnesium 2.0 1.6 - 2.4 mg/dL   NT-PRO BNP    Collection Time: 10/21/21  9:20 AM   Result Value Ref Range    NT pro-BNP >35,000 (H) <450 pg/mL   TROPONIN-HIGH SENSITIVITY    Collection Time: 10/21/21  9:20 AM   Result Value Ref Range    Troponin-High Sensitivity 43 0 - 76 ng/L       XR Results (maximum last 3): Results from [place] encounter on 10/21/21    XR FOOT RT AP/LAT    Narrative  Indication:? Osteomyelitis. AP and lateral right foot 10/21/2021. No prior. Regional osteopenia decreases sensitivity for erosions. No erosion is  identified. No dislocation or fracture. No soft tissue gas. No radiopaque foreign body. Impression  Nonspecific nonfocal osteopenia. XR FOOT LT AP/LAT    Narrative  Indication:? Osteomyelitis. AP and lateral left foot 10/21/2021. No prior. No dislocation or fracture. No apparent erosion. No soft tissue gas. No radiopaque foreign body. Impression  No radiographic evidence of osteomyelitis. XR CHEST PORT    Narrative  Indication: Hypoxia. AP semiupright portable chest radiograph 21 October 2021. No comparison. Left pleural effusion. Pulmonary vascular congestion pattern without overt pulmonary edema. Left basilar airspace disease more likely atelectasis than pneumonia, which is  not excluded. Normal heart size. No pneumothorax. CT Results (maximum last 3): No results found for this or any previous visit. MRI Results (maximum last 3): No results found for this or any previous visit.       Nuclear Medicine Results (maximum last 3): No results found for this or any previous visit. US Results (maximum last 3): No results found for this or any previous visit. Active Problems:    Multiple open wounds of foot (10/21/2021)        Assessment/Plan:     1. Bilateral foot wounds  - Patient has wound care at home  - Podiatry consult  - Wound care consult  Started on IV diuresis. 2. Lymphedema  3. PVD  - BNP > 35,000  - Non-compliant on home diuretic  - Start IV lasix 40 mg BID  - Wound care consult    4. Hypoxia  - Likely secondary to fluid overload. Patient non-compliant on home Lasix. - Chest x-ray showing pulmonary vascular congestion without overt pulmonary edema as well as left basilar airspace disease most likely atelectasis. - Received IV vancomycin x1 in the ED. Started on IV Zosyn for broad spectrum coverage.  - Continue supplemental oxygen to maintain saturations >92%: currently on 3 L/min  - Continue IV diuresis  - Start p.o. prednisone, Symbicort inh, and scheduled duonebs  - Would benefit from pulmonary follow-up outpatient  - Echocardiogram pending    5. MAURILIO   - BUN 47, creatinine 2.83. Baseline unknown. - Continue to monitor. Consider nephrology consult. 6. Hypertension   - Hold home HCTZ. Will start Norvasc 5 mg daily.  - IV hydralazine prn    7.  Hyperlipidemia  - Continue home statin  - Lipid panel pending    DVT Prophylaxis - lovenox  Code Status - full  POA    Total Time >55 minutes      Signed By: More Hewitt PA-C     October 21, 2021

## 2021-10-21 NOTE — PROGRESS NOTES
10/21/21. Pt & caregiver informed of MOON/OBS notice, verbalized understanding, , & signed. Copy to pt, copy in chart, & original to HIM for scanning into EMR. D/C Plan is home with prvt pay caregiver & [address] for wound care. Pt will need transportation home.

## 2021-10-21 NOTE — ROUTINE PROCESS
TRANSFER - OUT REPORT:    Verbal report given to Maryse(name) on [facility]  being transferred to [facility](unit) for routine progression of care       Report consisted of patients Situation, Background, Assessment and   Recommendations(SBAR). Information from the following report(s) SBAR, ED Summary, MAR, Recent Results and Cardiac Rhythm SR and SB was reviewed with the receiving nurse. Lines:   Peripheral IV 10/21/21 Left Antecubital (Active)        Opportunity for questions and clarification was provided.       Patient transported with:   Monitor  O2 @ 3 liters  Tech

## 2021-10-21 NOTE — PROGRESS NOTES
Received patient from stretcher accompanied by ED Tech. Patient lying on bed on high hickman's position, on oxygen at 3lpm via nasal cannula, hooked to [facility] on wall suction. Patient not in distress and no complaints of pain.

## 2021-10-22 PROBLEM — R60.0 BILATERAL LEG EDEMA: Status: ACTIVE | Noted: 2021-01-01

## 2021-10-22 NOTE — PROGRESS NOTES
Day:02 Vancomycin Update  Vancomycin Random was around 5 this AM after receiving Vanc 750 mg x 1 yesterday. Renal function has declined by 25%. WBC is elevated today. C& S showed gram positive cocci. Patient has MAURILIO on CKD.  Scheduled Vanc 750 mg x1 today and Random for tomorrow AM.

## 2021-10-22 NOTE — PROGRESS NOTES
Patient AAOx4, Twice patient suddenly woke up out of sleep and pulled off clothes and pulled out IV. He was easily reoriented.   VSS  Adequate UOP  Currently on 3LNC  Wound dressings c/d/i  Bed alarm on  Bed in lowest position  Call light in reach  Cont to monitor

## 2021-10-22 NOTE — PROGRESS NOTES
Hospitalist Progress Note    Subjective:   Daily Progress Note: 10/22/2021 7:43 AM    Hospital Course: Patient is an 72-year-old male with a past medical history of chronic lymphedema, PVD, hypertension, hypercholesterolemia who presented to the ED on 10/21/2021 for bilateral foot wounds and lower extremity edema. He called EMS and when they arrived they noticed oxygen saturations were in the 70s. He was placed initially on a 15 L nonrebreather with improvement. In the ED he was transitioned to 3 L nasal cannula. Labs are significant for an elevated renal function of a BUN 47 with a serum creatinine of 2.83 and a BNP of greater than 35,000. Chest x-ray showed left pleural effusion with pulmonary vascular congestion pattern without any overt edema. He was given a dose of IV vancomycin in the ED and started on IV Zosyn. Given IV diuretics for milligrams twice daily. Podiatry and wound care consulted      Subjective: Patient is resting comfortably. Denies any foot pain but has swelling.     Current Facility-Administered Medications   Medication Dose Route Frequency    sodium chloride (NS) flush 5-40 mL  5-40 mL IntraVENous Q8H    sodium chloride (NS) flush 5-40 mL  5-40 mL IntraVENous PRN    acetaminophen (TYLENOL) tablet 650 mg  650 mg Oral Q6H PRN    Or    acetaminophen (TYLENOL) suppository 650 mg  650 mg Rectal Q6H PRN    polyethylene glycol (MIRALAX) packet 17 g  17 g Oral DAILY PRN    ondansetron (ZOFRAN ODT) tablet 4 mg  4 mg Oral Q8H PRN    Or    ondansetron (ZOFRAN) injection 4 mg  4 mg IntraVENous Q6H PRN    enoxaparin (LOVENOX) injection 30 mg  30 mg SubCUTAneous DAILY    aspirin delayed-release tablet 81 mg  81 mg Oral DAILY    atorvastatin (LIPITOR) tablet 40 mg  40 mg Oral DAILY    furosemide (LASIX) injection 40 mg  40 mg IntraVENous BID    hydrALAZINE (APRESOLINE) 20 mg/mL injection 20 mg  20 mg IntraVENous Q6H PRN    VANCOMYCIN INFORMATION NOTE 1 Each  1 Each Other Rx Dosing/Monitoring    albuterol-ipratropium (DUO-NEB) 2.5 MG-0.5 MG/3 ML  3 mL Nebulization Q6H RT    budesonide-formoteroL (SYMBICORT) 160-4.5 mcg/actuation HFA inhaler 2 Puff  2 Puff Inhalation BID RT    predniSONE (DELTASONE) tablet 40 mg  40 mg Oral DAILY WITH BREAKFAST    amLODIPine (NORVASC) tablet 5 mg  5 mg Oral DAILY    influenza vaccine  (6 mos+)(PF) (FLUARIX/FLULAVAL/FLUZONE QUAD) injection 0.5 mL  1 Each IntraMUSCular PRIOR TO DISCHARGE    piperacillin-tazobactam (ZOSYN) 3.375 g in 0.9% sodium chloride (MBP/ADV) 100 mL MBP  3.375 g IntraVENous Q8H        Review of Systems  Constitutional: No fevers, No chills, No sweats, + fatigue, + Weakness  Eyes: No redness  Ears, nose, mouth, throat, and face: No nasal congestion, No sore throat, No voice change  Respiratory: No Shortness of Breath, No cough, No wheezing  Cardiovascular: No chest pain, No palpitations, No extremity edema  Gastrointestinal: No nausea, No vomiting, No diarrhea, No abdominal pain  Genitourinary: No frequency, No dysuria, No hematuria  Integument/breast: No skin lesion(s)   Neurological: No Confusion, No headaches, No dizziness      Objective:     Visit Vitals  BP (!) 128/44 (BP 1 Location: Right upper arm, BP Patient Position: At rest;Lying)   Pulse (!) 51   Temp 98 °F (36.7 °C)   Resp 18   Ht 6' (1.829 m)   Wt 113.4 kg (250 lb)   SpO2 97%   BMI 33.91 kg/m²    O2 Flow Rate (L/min): 3 l/min O2 Device: Nasal cannula    Temp (24hrs), Av.7 °F (36.5 °C), Min:96.7 °F (35.9 °C), Max:98.7 °F (37.1 °C)      No intake/output data recorded. 10/20 1901 - 10/22 0700  In: 600 [P.O.:300; I.V.:300]  Out: 1450 [Urine:1450]    PHYSICAL EXAM:  Constitutional: No acute distress  Skin: Extremities and face reveal no rashes, lower feet wrapped in a dressing which are clean, dry, intact, chronic lymphedema noted in legs bilaterally  HEENT: Sclerae anicteric. Extra-occular muscles are intact. No oral ulcers. The neck is supple and no masses. Cardiovascular: Regular rate and rhythm. Respiratory:  Clear breath sounds bilaterally with no wheezes, rales, or rhonchi. GI: Abdomen nondistended, soft, and nontender. Normal active bowel sounds. Musculoskeletal: No pitting edema of the lower legs. Able to move all ext  Neurological:  Patient is alert and oriented. Cranial nerves II-XII grossly intact  Psychiatric: Mood appears appropriate       Data Review    Recent Results (from the past 24 hour(s))   EKG, 12 LEAD, INITIAL    Collection Time: 10/21/21  8:48 AM   Result Value Ref Range    Ventricular Rate 62 BPM    Atrial Rate 62 BPM    P-R Interval 182 ms    QRS Duration 160 ms    Q-T Interval 484 ms    QTC Calculation (Bezet) 491 ms    Calculated P Axis 37 degrees    Calculated R Axis 22 degrees    Calculated T Axis -16 degrees    Diagnosis       Normal sinus rhythm  Right bundle branch block  T wave abnormality, consider inferior ischemia  Abnormal ECG  No previous ECGs available  Confirmed by KRYSTIAN PATEL ([id number]) on 10/21/2021 8:28:22 PM     CULTURE, BLOOD, PAIRED    Collection Time: 10/21/21  9:20 AM    Specimen: Blood   Result Value Ref Range    Special Requests: No Special Requests      Culture result: No growth after 4 hours     LACTIC ACID    Collection Time: 10/21/21  9:20 AM   Result Value Ref Range    Lactic acid 0.9 0.4 - 2.0 mmol/L   METABOLIC PANEL, COMPREHENSIVE    Collection Time: 10/21/21  9:20 AM   Result Value Ref Range    Sodium 143 136 - 145 mmol/L    Potassium 5.1 3.5 - 5.1 mmol/L    Chloride 111 (H) 97 - 108 mmol/L    CO2 28 21 - 32 mmol/L    Anion gap 4 (L) 5 - 15 mmol/L    Glucose 103 (H) 65 - 100 mg/dL    BUN 47 (H) 6 - 20 mg/dL    Creatinine 2.83 (H) 0.70 - 1.30 mg/dL    BUN/Creatinine ratio 17 12 - 20      GFR est AA 26 (L) >60 ml/min/1.73m2    GFR est non-AA 22 (L) >60 ml/min/1.73m2    Calcium 8.5 8.5 - 10.1 mg/dL    Bilirubin, total 0.6 0.2 - 1.0 mg/dL    AST (SGOT) 37 15 - 37 U/L    ALT (SGPT) 24 12 - 78 U/L    Alk. phosphatase 119 (H) 45 - 117 U/L    Protein, total 8.2 6.4 - 8.2 g/dL    Albumin 2.3 (L) 3.5 - 5.0 g/dL    Globulin 5.9 (H) 2.0 - 4.0 g/dL    A-G Ratio 0.4 (L) 1.1 - 2.2     CBC WITH AUTOMATED DIFF    Collection Time: 10/21/21  9:20 AM   Result Value Ref Range    WBC 10.2 4.1 - 11.1 K/uL    RBC 4.08 (L) 4.10 - 5.70 M/uL    HGB 11.7 (L) 12.1 - 17.0 g/dL    HCT 39.1 36.6 - 50.3 %    MCV 95.8 80.0 - 99.0 FL    MCH 28.7 26.0 - 34.0 PG    MCHC 29.9 (L) 30.0 - 36.5 g/dL    RDW 15.0 (H) 11.5 - 14.5 %    PLATELET 659 045 - 139 K/uL    MPV 11.2 8.9 - 12.9 FL    NRBC 0.0 0.0  WBC    ABSOLUTE NRBC 0.00 0.00 - 0.01 K/uL    NEUTROPHILS 82 (H) 32 - 75 %    LYMPHOCYTES 10 (L) 12 - 49 %    MONOCYTES 6 5 - 13 %    EOSINOPHILS 0 0 - 7 %    BASOPHILS 1 0 - 1 %    IMMATURE GRANULOCYTES 1 (H) 0 - 0.5 %    ABS. NEUTROPHILS 8.4 (H) 1.8 - 8.0 K/UL    ABS. LYMPHOCYTES 1.0 0.8 - 3.5 K/UL    ABS. MONOCYTES 0.6 0.0 - 1.0 K/UL    ABS. EOSINOPHILS 0.0 0.0 - 0.4 K/UL    ABS. BASOPHILS 0.1 0.0 - 0.1 K/UL    ABS. IMM.  GRANS. 0.1 (H) 0.00 - 0.04 K/UL    DF AUTOMATED     LIPASE    Collection Time: 10/21/21  9:20 AM   Result Value Ref Range    Lipase 121 73 - 393 U/L   MAGNESIUM    Collection Time: 10/21/21  9:20 AM   Result Value Ref Range    Magnesium 2.0 1.6 - 2.4 mg/dL   NT-PRO BNP    Collection Time: 10/21/21  9:20 AM   Result Value Ref Range    NT pro-BNP >35,000 (H) <450 pg/mL   TROPONIN-HIGH SENSITIVITY    Collection Time: 10/21/21  9:20 AM   Result Value Ref Range    Troponin-High Sensitivity 43 0 - 76 ng/L   CULTURE, WOUND W GRAM STAIN    Collection Time: 10/21/21 11:15 AM    Specimen: Wound    RIGHT FOOT   Result Value Ref Range    Special Requests: No Special Requests      GRAM STAIN Few WBCs seen      GRAM STAIN 1+ Gram Positive Cocci      GRAM STAIN 1+ Gram Positive Rods      Culture result: PENDING    TSH 3RD GENERATION    Collection Time: 10/21/21  4:55 PM   Result Value Ref Range    TSH 3.17 0.36 - 1.72 uIU/mL   METABOLIC PANEL, BASIC    Collection Time: 10/22/21  6:15 AM   Result Value Ref Range    Sodium 146 (H) 136 - 145 mmol/L    Potassium 4.6 3.5 - 5.1 mmol/L    Chloride 113 (H) 97 - 108 mmol/L    CO2 28 21 - 32 mmol/L    Anion gap 5 5 - 15 mmol/L    Glucose 100 65 - 100 mg/dL    BUN 50 (H) 6 - 20 mg/dL    Creatinine 3.11 (H) 0.70 - 1.30 mg/dL    BUN/Creatinine ratio 16 12 - 20      GFR est AA 24 (L) >60 ml/min/1.73m2    GFR est non-AA 19 (L) >60 ml/min/1.73m2    Calcium 8.6 8.5 - 10.1 mg/dL   CBC WITH AUTOMATED DIFF    Collection Time: 10/22/21  6:15 AM   Result Value Ref Range    WBC 12.7 (H) 4.1 - 11.1 K/uL    RBC 3.92 (L) 4.10 - 5.70 M/uL    HGB 10.8 (L) 12.1 - 17.0 g/dL    HCT 38.1 36.6 - 50.3 %    MCV 97.2 80.0 - 99.0 FL    MCH 27.6 26.0 - 34.0 PG    MCHC 28.3 (L) 30.0 - 36.5 g/dL    RDW 15.3 (H) 11.5 - 14.5 %    PLATELET 034 203 - 993 K/uL    MPV 11.1 8.9 - 12.9 FL    NRBC 0.0 0.0  WBC    ABSOLUTE NRBC 0.00 0.00 - 0.01 K/uL    NEUTROPHILS 78 (H) 32 - 75 %    LYMPHOCYTES 12 12 - 49 %    MONOCYTES 8 5 - 13 %    EOSINOPHILS 1 0 - 7 %    BASOPHILS 1 0 - 1 %    IMMATURE GRANULOCYTES 0 0 - 0.5 %    ABS. NEUTROPHILS 10.1 (H) 1.8 - 8.0 K/UL    ABS. LYMPHOCYTES 1.5 0.8 - 3.5 K/UL    ABS. MONOCYTES 1.0 0.0 - 1.0 K/UL    ABS. EOSINOPHILS 0.1 0.0 - 0.4 K/UL    ABS. BASOPHILS 0.1 0.0 - 0.1 K/UL    ABS. IMM. GRANS. 0.0 0.00 - 0.04 K/UL    DF AUTOMATED     NT-PRO BNP    Collection Time: 10/22/21  6:15 AM   Result Value Ref Range    NT pro-BNP >35,000 (H) <450 pg/mL   VANCOMYCIN, RANDOM    Collection Time: 10/22/21  6:15 AM   Result Value Ref Range    Vancomycin, random 5.8 ug/mL       Radiology review: Chest xray and bilateral feet    Assessment:   1. Bilateral foot wounds complicated due to PVD  2. Lymphedema  3. Acute hypoxic respiratory failure secondary to acute systolic CHF  4. Acute kidney injury  5. Hypertension  6. Hyperlipidemia    Plan:    1. WBC elevated. Afebrile wound culture pending.   Lactic acid within normal limits. On IV Zosyn and vancomycin. Wound care and podiatry consulted  2. Patient has chronic lymphedema. Started on IV Lasix 4 mg twice daily. 2D echo ordered  3. Chest x-ray reviewed. BNP elevated. We will continue with IV diuretics for another 24 hours and repeat chest x-ray in the a.m. Continue with oral prednisone, Symbicort, duo nebs to optimize pulmonary function  4. Renal function elevated. Pending for today. We will continue to monitor closely as we diurese. 5.  Blood pressure stable. On Norvasc and Lasix  6. Continue with aspirin statin  7. CBC BMP in a.m. Dispo: 24 to 48 hours. Barriers include results of above testing and improvement in lower extremity edema. He is still requiring IV Lasix. And pending podiatry and wound care consult     CODE STATUS full     DVT prophylaxis: Lovenox  Ulcer prophylaxis: Tolerating oral intake    Care Plan discussed with: Patient/Family, Nurse and     Total time spent with patient: 34 minutes.

## 2021-10-22 NOTE — PROGRESS NOTES
Problem: Falls - Risk of  Goal: *Absence of Falls  Description: Document Clydene Bone Fall Risk and appropriate interventions in the flowsheet.   Outcome: Progressing Towards Goal  Note: Fall Risk Interventions:            Medication Interventions: Patient to call before getting OOB, Bed/chair exit alarm, Teach patient to arise slowly    Elimination Interventions: Bed/chair exit alarm, Call light in reach, Patient to call for help with toileting needs

## 2021-10-22 NOTE — PROGRESS NOTES
Spoke with patient at bedside about discharge planning and home health agency he uses. Stated he uses [facility] and would like to continue with them. They appear to be a personal care aid nd he has been in contact wit them. Keith[place]  He is also interested in a different mattress due to postion changes and worry for breakdown. Will address with nurse.

## 2021-10-22 NOTE — PROGRESS NOTES
Nutrition Note    RD screened for possible wounds. Pt with BLLE cellulitis, no nutritionally significant wounds identified. RD visited with pt and visitor at bedside. PO intakes % per EMR and per pt report. Denied need for supplement.  Will monitor and f/u as indicated    Electronically signed by Sonido Castellano on 10/22/2021 at 12:46 PM    Contact: [id number] or via [employer]

## 2021-10-23 PROBLEM — N17.9 ACUTE KIDNEY INJURY (HCC): Status: ACTIVE | Noted: 2021-01-01

## 2021-10-23 NOTE — PROGRESS NOTES
Patient AAOx4  VSS, on 3L NC  Adequate UOP  Bed in lowest position   Call light in reach   Cont to monitor

## 2021-10-23 NOTE — ASSESSMENT & PLAN NOTE
- Contributory conditions include lower extremity recurrent soft tissue infection, vascular disease, volume overload      Plan  Follow-up renal ultrasound  Obtain urinalysis and urine lites  Hold furosemide given rising BUN and creatinine  Fluid resuscitation with albumin 25% 1 g/kilogram may improve intravascular volume

## 2021-10-23 NOTE — ASSESSMENT & PLAN NOTE
In the setting of peripheral vascular disease, chronic venous stasis, volume overload  Initial Lasix, with less than ideal urine output  Concurrent hypoalbuminemia      Plan  Leg elevation  Compression stockings  Albumin 25% 1 g/kg ideal body weight  Continue to hold furosemide given rising creatinine  Dasilva with strict output charting

## 2021-10-23 NOTE — CONSULTS
Consult Date: 10/23/2021    IP CONSULT TO INFECTIOUS DISEASES  Consult performed by: Mil Lozada MD  Consult ordered by: Paola Sterling PA-C      81M with history of smoking, hypertension, dyslipidemia, repaired AAA, obesity. Chronic venous stasis of the lower extremities with recurrent inflammation. 2018 I saw him at [facility] for complications of venous stasis dermatitis. At that hospital visit right leg wound cultures revealed MSSA and PSEUDOMONAS, managed with a two weeks course of levofloxacin and doxycycline at that time. 10/21/21 presents to hospital with a complaint of bilateral foot wound checks and lower extremity edema. Associated with bilateral lower extremity pain. EMS found him hypoxic and managed with supplemental oxygen. During the ED course chest x-ray showing pulmonary vascular congestion without overt pulmonary edema as well as left basilar airspace disease most likely atelectasis. Started on IV diuresis. Admitted to hospital and podiatry and wound care have been asked to see him for his condition. Wound cultures from the right and left foot suggestive of multiple organisms and I have been asked to see him in consultation.     Subjective     Past Medical History:   Diagnosis Date    High cholesterol     Hx of long term use of blood thinners     aspirin    Hypertension     Lymphedema     Vascular disease       Past Surgical History:   Procedure Laterality Date    HX OTHER SURGICAL      repair of tendon achilles    HX OTHER SURGICAL  2012    aortic anrysm 6/> cm diamater     Family History   Problem Relation Age of Onset    Hypertension Mother       Social History     Tobacco Use    Smoking status: Former Smoker     Packs/day: 2.00     Years: 50.00     Pack years: 100.00     Quit date:      Years since quittin.8    Smokeless tobacco: Never Used   Substance Use Topics    Alcohol use: Not on file       Current Facility-Administered Medications   Medication Dose Route Frequency Provider Last Rate Last Admin    [START ON 10/24/2021] ammonium lactate (AMLACTIN) 12 % cream   Topical DAILY Fidelia Valencia PA-C        vancomycin (VANCOCIN) 1,000 mg in 0.9% sodium chloride 250 mL (VIAL-MATE)  1,000 mg IntraVENous ONCE Misty Akbar  mL/hr at 10/23/21 1258 1,000 mg at 10/23/21 1258    [START ON 10/24/2021] DUE: Vancomycin Level 10/24 at 0400   Other ONCE Reagan Hidalgo MD        heparin (porcine) injection 5,000 Units  5,000 Units SubCUTAneous Q8H Fidelia Valencia PA-C        iron sucrose (VENOFER) injection 200 mg  200 mg IntraVENous Q24H Fidelia Valencia PA-C   200 mg at 10/22/21 1708    sodium chloride (NS) flush 5-40 mL  5-40 mL IntraVENous Q8H Elizebeth Sides, PA-C   10 mL at 10/22/21 2153    sodium chloride (NS) flush 5-40 mL  5-40 mL IntraVENous PRN Elizebeth Sides, PA-C        acetaminophen (TYLENOL) tablet 650 mg  650 mg Oral Q6H PRN Elizebeth Sides, PA-C        Or    acetaminophen (TYLENOL) suppository 650 mg  650 mg Rectal Q6H PRN Elizebeth Sides, PA-C        polyethylene glycol (MIRALAX) packet 17 g  17 g Oral DAILY PRN Elizebeth Sides, PA-C        ondansetron (ZOFRAN ODT) tablet 4 mg  4 mg Oral Q8H PRN Elizebeth Sides, PA-C        Or    ondansetron [facility]) injection 4 mg  4 mg IntraVENous Q6H PRN Elizebeth Sides, PA-C        aspirin delayed-release tablet 81 mg  81 mg Oral DAILY Elizebeth Sides, PA-C   81 mg at 10/23/21 2516    atorvastatin (LIPITOR) tablet 40 mg  40 mg Oral DAILY Elizebeth Sides, PA-C   40 mg at 10/23/21 0611    [Held by provider] furosemide (LASIX) injection 40 mg  40 mg IntraVENous BID Elizebeth Sides, PA-C   40 mg at 10/23/21 0760    hydrALAZINE (APRESOLINE) 20 mg/mL injection 20 mg  20 mg IntraVENous Q6H PRN Karla Nick PA-C        VANCOMYCIN INFORMATION NOTE 1 Each  1 Each Other Rx Dosing/Monitoring Garett Trevino NP        budesonide-formoteroL (SYMBICORT) 160-4.5 mcg/actuation HFA inhaler 2 Puff  2 Puff Inhalation BID RT Wander Lloyd PA-C   2 Puff at 10/23/21 1017    predniSONE (DELTASONE) tablet 40 mg  40 mg Oral DAILY WITH BREAKFAST Fidelia Valencia PA-C   40 mg at 10/23/21 9566    amLODIPine (NORVASC) tablet 5 mg  5 mg Oral DAILY Wander Lloyd PA-C   5 mg at 10/23/21 9931    influenza vaccine 2021-22 (6 mos+)(PF) (FLUARIX/FLULAVAL/FLUZONE QUAD) injection 0.5 mL  1 Each IntraMUSCular PRIOR TO DISCHARGE Wander Lloyd PA-C        piperacillin-tazobactam (ZOSYN) 3.375 g in 0.9% sodium chloride (MBP/ADV) 100 mL MBP  3.375 g IntraVENous Q8H Arianna Parada MD 25 mL/hr at 10/23/21 0930 3.375 g at 10/23/21 0930        Review of Systems   All other systems reviewed and are negative. Objective     Vital signs for last 24 hours:  Visit Vitals  /65 (BP 1 Location: Left upper arm, BP Patient Position: At rest)   Pulse 86   Temp 98.4 °F (36.9 °C)   Resp 22   Ht 6' (1.829 m)   Wt 113.4 kg (250 lb)   SpO2 96%   BMI 33.91 kg/m²       Intake/Output this shift:  Current Shift: No intake/output data recorded.   Last 3 Shifts: 10/21 1901 - 10/23 0700  In: 200 [I.V.:200]  Out: 1050 [Urine:1050]    Data Review:   Recent Results (from the past 24 hour(s))   METABOLIC PANEL, BASIC    Collection Time: 10/23/21  5:13 AM   Result Value Ref Range    Sodium 146 (H) 136 - 145 mmol/L    Potassium 4.1 3.5 - 5.1 mmol/L    Chloride 112 (H) 97 - 108 mmol/L    CO2 27 21 - 32 mmol/L    Anion gap 7 5 - 15 mmol/L    Glucose 115 (H) 65 - 100 mg/dL    BUN 59 (H) 6 - 20 mg/dL    Creatinine 3.60 (H) 0.70 - 1.30 mg/dL    BUN/Creatinine ratio 16 12 - 20      GFR est AA 20 (L) >60 ml/min/1.73m2    GFR est non-AA 16 (L) >60 ml/min/1.73m2    Calcium 8.3 (L) 8.5 - 10.1 mg/dL   CBC WITH AUTOMATED DIFF    Collection Time: 10/23/21  5:13 AM   Result Value Ref Range    WBC 11.6 (H) 4.1 - 11.1 K/uL    RBC 3.66 (L) 4.10 - 5.70 M/uL    HGB 10.4 (L) 12.1 - 17.0 g/dL    HCT 35.2 (L) 36.6 - 50.3 %    MCV 96.2 80.0 - 99.0 FL    MCH 28.4 26.0 - 34.0 PG    MCHC 29.5 (L) 30.0 - 36.5 g/dL    RDW 15.1 (H) 11.5 - 14.5 %    PLATELET 764 370 - 323 K/uL    MPV 11.3 8.9 - 12.9 FL    NRBC 0.0 0.0  WBC    ABSOLUTE NRBC 0.00 0.00 - 0.01 K/uL    NEUTROPHILS 84 (H) 32 - 75 %    LYMPHOCYTES 8 (L) 12 - 49 %    MONOCYTES 6 5 - 13 %    EOSINOPHILS 0 0 - 7 %    BASOPHILS 1 0 - 1 %    IMMATURE GRANULOCYTES 1 (H) 0 - 0.5 %    ABS. NEUTROPHILS 9.8 (H) 1.8 - 8.0 K/UL    ABS. LYMPHOCYTES 0.9 0.8 - 3.5 K/UL    ABS. MONOCYTES 0.7 0.0 - 1.0 K/UL    ABS. EOSINOPHILS 0.0 0.0 - 0.4 K/UL    ABS. BASOPHILS 0.1 0.0 - 0.1 K/UL    ABS. IMM. GRANS. 0.1 (H) 0.00 - 0.04 K/UL    DF AUTOMATED     VANCOMYCIN, RANDOM    Collection Time: 10/23/21 10:50 AM   Result Value Ref Range    Vancomycin, random 9.9 ug/mL       Physical Exam    Constitutional: No acute distress  Skin: Extremities and face reveal no rashes, lower feet wrapped in a dressing which are clean, dry, intact, chronic lymphedema noted in legs bilaterally  HEENT: Sclerae anicteric. Extra-occular muscles are intact. No oral ulcers. The neck is supple and no masses. Cardiovascular: RRR  Respiratory:  Clear breath sounds bilaterally with no wheezes, rales, or rhonchi. GI: Abdomen nondistended, soft, and nontender. Normal active bowel sounds. Musculoskeletal: No pitting edema of the lower legs. Able to move all ext  Neurological:  Patient is alert and oriented. Cranial nerves II-XII grossly intact  Psychiatric: Mood appears appropriate     81M with history of smoking, hypertension, dyslipidemia, repaired AAA, obesity. Chronic venous stasis of the lower extremities with recurrent inflammation. July 2018 I saw him at [facility] for complications of venous stasis dermatitis. At that hospital visit right leg wound cultures revealed MSSA and PSEUDOMONAS, managed with a two weeks course of levofloxacin and doxycycline at that time.     10/21/21 presents to hospital with a complaint of bilateral foot wound checks and lower extremity edema. Associated with bilateral lower extremity pain. EMS found him hypoxic and managed with supplemental oxygen. During the ED course chest x-ray showing pulmonary vascular congestion without overt pulmonary edema as well as left basilar airspace disease most likely atelectasis. Started on IV diuresis. Admitted to hospital and podiatry and wound care have been asked to see him for his condition. Wound cultures from the right and left foot suggestive of multiple organisms and I have been asked to see him in consultation. MICROBIOLOGY    7/28/18 R leg MSSA     Pseudomonas    10/21/21 Blood Negative so far    10/21/21 R foot Heavy Gram Negative Rods MULTIPLE COLONY TYPES     checking for possible BETA STREPTOCOCCUS    10/21/21 L foot Heavy Gram Negative Rods MULTIPLE COLONY TYPES     Heavy Streptococci, beta hemolyticgroup C    ASSESSMENT AND RECOMMENDATIONS    1) Chronic venous stasis of the bilateral lower extremities further complicated by complex soft tissue infection.      Agree with present vancomycin and Zosyn for now pending further culture data   Wound care inpatient and outpatient

## 2021-10-23 NOTE — PROGRESS NOTES
Day:3 Vancomycin Update    Vancomycin Random from 1050 resulted at 9.9 after receiving Vanc 750 mg x 1 yesterday. Renal function continues to decline. Wound culture shows GNR and GPC. Will order 1000 mg x 1 this afternoon and draw a random level tomorrow morning.      Jonathan Etienne, FOUZIAD

## 2021-10-23 NOTE — CONSULTS
Consult Date: 10/23/2021    IP CONSULT TO NEPHROLOGY  Consult performed by: Ezekiel Hubbard MD  Consult ordered by: Gianna Mabry PA-C          Subjective    This is a 21-year-old gentleman with past medical history of chronic venous stasis, recurrent lower extremity wounds, with recent wounds with MSSA and Pseudomonas, lower extremity edema, peripheral vascular disease, hypertension, tobacco abuse, who presented with worsening of bilateral foot wounds on 10/21. Patient also was having lower extremity swelling and pain. Patient was found to be hypoxic by the EMS. Subsequent chest x-ray showed significant pulmonary vascular congestion. He was started on IV diuretics at furosemide 40 mg IV twice daily. Patient had episodes of hypotension initially at 120/40 with low blood pressure holds to 100/55. Urine output documented is at 450 cc. During this time his creatinine up trended from 2.83-3.60    No urinalysis from this admission is available. No renal imaging is available. Patient's most recent creatinine is not known.         Past Medical History:   Diagnosis Date    High cholesterol     Hx of long term use of blood thinners     aspirin    Hypertension     Lymphedema     Vascular disease       Past Surgical History:   Procedure Laterality Date    HX OTHER SURGICAL      repair of tendon achilles    HX OTHER SURGICAL  2012    aortic anrysm 6/> cm diamater     Family History   Problem Relation Age of Onset    Hypertension Mother       Social History     Tobacco Use    Smoking status: Former Smoker     Packs/day: 2.00     Years: 50.00     Pack years: 100.00     Quit date:      Years since quittin.8    Smokeless tobacco: Never Used   Substance Use Topics    Alcohol use: Not on file       Current Facility-Administered Medications   Medication Dose Route Frequency Provider Last Rate Last Admin    [START ON 10/24/2021] ammonium lactate (AMLACTIN) 12 % cream   Topical DAILY Bishop TED August        [START ON 10/24/2021] DUE: Vancomycin Level 10/24 at 0400   Other ONCE Andre Gregory MD        heparin (porcine) injection 5,000 Units  5,000 Units SubCUTAneous Q8H Fidelia Valencia PA-C        iron sucrose (VENOFER) injection 200 mg  200 mg IntraVENous Q24H Fidelia Valencia PA-C   200 mg at 10/22/21 1708    sodium chloride (NS) flush 5-40 mL  5-40 mL IntraVENous Q8H Sindhu Cadet, PA-C   10 mL at 10/22/21 2153    sodium chloride (NS) flush 5-40 mL  5-40 mL IntraVENous PRN Sindhu Cadet, PA-C        acetaminophen (TYLENOL) tablet 650 mg  650 mg Oral Q6H PRN Sindhu Cadet, PA-C        Or    acetaminophen (TYLENOL) suppository 650 mg  650 mg Rectal Q6H PRN Sindhu Cadet, PA-C        polyethylene glycol (MIRALAX) packet 17 g  17 g Oral DAILY PRN Sindhu Cadet, PA-C        ondansetron (ZOFRAN ODT) tablet 4 mg  4 mg Oral Q8H PRN Sindhu Cadet, PA-C        Or    ondansetron [facility]) injection 4 mg  4 mg IntraVENous Q6H PRN Sindhu Cadet, PA-C        aspirin delayed-release tablet 81 mg  81 mg Oral DAILY Sindhu Cadet, PA-C   81 mg at 10/23/21 4898    atorvastatin (LIPITOR) tablet 40 mg  40 mg Oral DAILY Sindhu Cadet, PA-C   40 mg at 10/23/21 9082    [Held by provider] furosemide (LASIX) injection 40 mg  40 mg IntraVENous BID Sindhu Cadet, PA-C   40 mg at 10/23/21 3900    hydrALAZINE (APRESOLINE) 20 mg/mL injection 20 mg  20 mg IntraVENous Q6H PRN Sindhu Cadet, PA-C        VANCOMYCIN INFORMATION NOTE 1 Each  1 Each Other Rx Dosing/Monitoring Carlos Manuel Biggs NP        budesonide-formoteroL (SYMBICORT) 160-4.5 mcg/actuation HFA inhaler 2 Puff  2 Puff Inhalation BID RT Sindhu Cadet, PA-C   2 Puff at 10/23/21 1017    predniSONE (DELTASONE) tablet 40 mg  40 mg Oral DAILY WITH BREAKFAST Fidelia Valencia PA-C   40 mg at 10/23/21 0929    amLODIPine (NORVASC) tablet 5 mg  5 mg Oral DAILY Sindhu Marie PA-C   5 mg at 10/23/21 0929    influenza vaccine 2021-22 (6 mos+)(PF) (FLUARIX/FLULAVAL/FLUZONE QUAD) injection 0.5 mL  1 Each IntraMUSCular PRIOR TO DISCHARGE Brennan Burt PA-C        piperacillin-tazobactam (ZOSYN) 3.375 g in 0.9% sodium chloride (MBP/ADV) 100 mL MBP  3.375 g IntraVENous Q8H Reji Hogan MD 25 mL/hr at 10/23/21 0930 3.375 g at 10/23/21 0930        Review of Systems    Objective     Vital signs for last 24 hours:  Visit Vitals  /65 (BP 1 Location: Left upper arm, BP Patient Position: At rest)   Pulse 86   Temp 98.4 °F (36.9 °C)   Resp 22   Ht 6' (1.829 m)   Wt 113.4 kg (250 lb)   SpO2 96%   BMI 33.91 kg/m²       Intake/Output this shift:  Current Shift: No intake/output data recorded.   Last 3 Shifts: 10/21 1901 - 10/23 0700  In: 200 [I.V.:200]  Out: 1050 [Urine:1050]    Data Review:   Recent Results (from the past 24 hour(s))   METABOLIC PANEL, BASIC    Collection Time: 10/23/21  5:13 AM   Result Value Ref Range    Sodium 146 (H) 136 - 145 mmol/L    Potassium 4.1 3.5 - 5.1 mmol/L    Chloride 112 (H) 97 - 108 mmol/L    CO2 27 21 - 32 mmol/L    Anion gap 7 5 - 15 mmol/L    Glucose 115 (H) 65 - 100 mg/dL    BUN 59 (H) 6 - 20 mg/dL    Creatinine 3.60 (H) 0.70 - 1.30 mg/dL    BUN/Creatinine ratio 16 12 - 20      GFR est AA 20 (L) >60 ml/min/1.73m2    GFR est non-AA 16 (L) >60 ml/min/1.73m2    Calcium 8.3 (L) 8.5 - 10.1 mg/dL   CBC WITH AUTOMATED DIFF    Collection Time: 10/23/21  5:13 AM   Result Value Ref Range    WBC 11.6 (H) 4.1 - 11.1 K/uL    RBC 3.66 (L) 4.10 - 5.70 M/uL    HGB 10.4 (L) 12.1 - 17.0 g/dL    HCT 35.2 (L) 36.6 - 50.3 %    MCV 96.2 80.0 - 99.0 FL    MCH 28.4 26.0 - 34.0 PG    MCHC 29.5 (L) 30.0 - 36.5 g/dL    RDW 15.1 (H) 11.5 - 14.5 %    PLATELET 387 732 - 802 K/uL    MPV 11.3 8.9 - 12.9 FL    NRBC 0.0 0.0  WBC    ABSOLUTE NRBC 0.00 0.00 - 0.01 K/uL    NEUTROPHILS 84 (H) 32 - 75 %    LYMPHOCYTES 8 (L) 12 - 49 %    MONOCYTES 6 5 - 13 %    EOSINOPHILS 0 0 - 7 % BASOPHILS 1 0 - 1 %    IMMATURE GRANULOCYTES 1 (H) 0 - 0.5 %    ABS. NEUTROPHILS 9.8 (H) 1.8 - 8.0 K/UL    ABS. LYMPHOCYTES 0.9 0.8 - 3.5 K/UL    ABS. MONOCYTES 0.7 0.0 - 1.0 K/UL    ABS. EOSINOPHILS 0.0 0.0 - 0.4 K/UL    ABS. BASOPHILS 0.1 0.0 - 0.1 K/UL    ABS. IMM.  GRANS. 0.1 (H) 0.00 - 0.04 K/UL    DF AUTOMATED     VANCOMYCIN, RANDOM    Collection Time: 10/23/21 10:50 AM   Result Value Ref Range    Vancomycin, random 9.9 ug/mL       Physical Exam

## 2021-10-23 NOTE — PROGRESS NOTES
PHYSICAL THERAPY EVALUATION  Patient: Mena Foley (70 y.o. male)  Date: 10/23/2021  Primary Diagnosis: Multiple open wounds of foot [S91.309A]  Bilateral leg edema [R60.0]        Precautions: HIGH  Fall risk     ASSESSMENT  Based on the objective data described below, the patient presents with ongoing debility ; history of falls ; LE weakness ; difficulty with transfers and gait ; pain on R knee . This is a 73-year-old gentleman with past medical history of chronic venous stasis, recurrent lower extremity wounds, with recent wounds with MSSA and Pseudomonas, lower extremity edema, peripheral vascular disease, hypertension, tobacco abuse, who presented with worsening of bilateral foot wounds on 10/21. Patient also was having lower extremity swelling and pain; Patient stated being IND with all aspects of mobility and transfers with use of FWW . Patient stated not utilizing bed but sleeps on reclining chair ; 3x a week visit from [facility] 21 ; and is currently being visited by  nursing - home health . Patient instructed on need for Physical therapy services to address his gait and limitations ; improved mobility and facilitate decreasing fall risk patterns . Patient stated wanting to be DC to home despite not demonstrating more mobility at evaluation with walking even at bedside perimeter    Current Level of Function Impacting Discharge (mobility/balance): Max assist with transfers due to knee pain ; declined walking with 1 person assist and use of FWW   T  Functional Outcome Measure: The patient scored CM on the outcome measure    Other factors to consider for discharge: pain on knees ; this writer recommends SNF DC due to inability to demonstrate walking      Patient will benefit from skilled therapy intervention to address the above noted impairments.        PLAN :  Recommendations and Planned Interventions: bed mobility training, transfer training, therapeutic exercises, and therapeutic activities Frequency/Duration: Patient will be followed by physical therapy:  3-5x/week to address goals. Recommendation for discharge: (in order for the patient to meet his/her long term goals)  Solo Hudson    This discharge recommendation:  A follow-up discussion with the attending provider and/or case management is planned    IF patient discharges home will need the following DME: rolling walker         SUBJECTIVE:   Patient stated I know I failed in this test to walk but I want to go home and receive home health .     OBJECTIVE DATA SUMMARY:   HISTORY:    Past Medical History:   Diagnosis Date    High cholesterol     Hx of long term use of blood thinners     aspirin    Hypertension     Lymphedema     Vascular disease      Past Surgical History:   Procedure Laterality Date    HX OTHER SURGICAL      repair of tendon achilles    HX OTHER SURGICAL  2012    aortic anrysm 6/> cm diamater       Personal factors and/or comorbidities impacting plan of care:     Home Situation  Home Environment: Private residence  One/Two Story Residence: One story  Living Alone: Yes  Support Systems: Caregiver/Home Care Staff  Patient Expects to be Discharged to[de-identified] House  Current DME Used/Available at Home: Lift chair, Walker, rolling    PLOF: Pt MOD IND  for ADLS/IADLS, MOD IND with mobility prior to admission. EXAMINATION/PRESENTATION/DECISION MAKING:   Critical Behavior:  Neurologic State: Alert  Orientation Level: Oriented X4  Cognition: Decreased attention/concentration, Follows commands  Safety/Judgement: Fall prevention, Good awareness of safety precautions  Hearing:   Auditory  Auditory Impairment: None  Skin:  see chart   Edema: B knees have grade 1 swelling   Range Of Motion:      WFL on B hips knees and ankle but with pain at end range      Strength:    Strength: Generally decreased, functional        Grossly graded at 3-/5 on B hips knees and ankle ms grps      Tone & Sensation:    Normotonic Coordination:NT     Vision: NT     Functional Mobility:  Bed Mobility:  Rolling: Minimum assistance  Supine to Sit: Minimum assistance  Sit to Supine: Minimum assistance  Scooting: Maximum assistance  Transfers:  Sit to Stand: Maximum assistance  Stand to Sit: Maximum assistance  Stand Pivot Transfers: Total assistance     Bed to Chair: Maximum assistance              Balance:   Sitting: Intact  Standing: With support  Ambulation/Gait Training:                    Right Side Weight Bearing: As tolerated  Left Side Weight Bearing: As tolerated         Patient declined to ambulate at time of evaluation following pain    Stairs: Therapeutic Exercises:   Therapeutic exercises ; standing balance activities ; AROME in bed for B UE and LE     Functional Measure:    [address] Short Form  How much difficulty does the patient currently have. .. Unable A Lot A Little None   1. Turning over in bed (including adjusting bedclothes, sheets and blankets)? [] 1   [x] 2   [] 3   [] 4   2. Sitting down on and standing up from a chair with arms ( e.g., wheelchair, bedside commode, etc.)   [] 1   [x] 2   [] 3   [] 4   3. Moving from lying on back to sitting on the side of the bed? [] 1   [x] 2   [] 3   [] 4          How much help from another person does the patient currently need. .. Total A Lot A Little None   4. Moving to and from a bed to a chair (including a wheelchair)? [x] 1   [] 2   [] 3   [] 4   5. Need to walk in hospital room? [x] 1   [] 2   [] 3   [] 4   6. Climbing 3-5 steps with a railing? [x] 1   [] 2   [] 3   [] 4   © 2007, Trustees of [address] [place] [address] [address] [address], under license to [employer]. All rights reserved     Score:  Initial: CM  Most Recent: CM  (Date: 10/23/21 )   Interpretation of Tool:  Represents activities that are increasingly more difficult (i.e. Bed mobility, Transfers, Gait).   Score 24 23 22-20 19-15 14-10 9-7 6   Modifier  CI CJ CK CL CM CN          Physical Therapy Evaluation Charge Determination   History Examination Presentation Decision-Making   MEDIUM  Complexity : 1-2 comorbidities / personal factors will impact the outcome/ POC  MEDIUM Complexity : 3 Standardized tests and measures addressing body structure, function, activity limitation and / or participation in recreation  MEDIUM Complexity : Evolving with changing characteristics  Other Functional Measure AMPAC 6 CM      Based on the above components, the patient evaluation is determined to be of the following complexity level: MEDIUM    Pain Ratin/10 on L knee    Activity Tolerance:   requires rest breaks  Please refer to the flowsheet for vital signs taken during this treatment. After treatment patient left in no apparent distress:   Supine in bed, Call bell within reach, and Bed / chair alarm activated      roblem: Mobility Impaired (Adult and Pediatric)  Goal: *Acute Goals and Plan of Care (Insert Text)  Outcome: Progressing Towards Goal  Note:   Pt will be I with LE HEP in 7 days. Pt will perform bed mobility with MOD IND  in 7 days. Pt will perform transfers with MIN A  in 7 days. Pt will be able to stand > 5 mins with LRAD safely with SBA in 7 days. Problem: Patient Education: Go to Patient Education Activity  Goal: Patient/Family Education  Outcome: Progressing Towards Goal  Note:   Pt will be able to initiate transfers IND x 1 person from bed to bedside commode   Pt will verbalize and demonstrate compliance with fall risk precautions/restrictions to include daily MOBILITY  in 7 days. Pt will demonstrate improvement in standing/seated static/dynamic balance from POOR to FAIR  in 7 days.          COMMUNICATION/EDUCATION:   The patients plan of care was discussed with: Physical therapist.     Fall prevention education was provided and the patient/caregiver indicated understanding., Patient/family have participated as able in goal setting and plan of care., and Patient/family agree to work toward stated goals and plan of care.     Thank you for this referral.  Raymundo Billy   Time Calculation: 30 mins       P

## 2021-10-23 NOTE — PROGRESS NOTES
Problem: Falls - Risk of  Goal: *Absence of Falls  Description: Document Jessica Sarah Fall Risk and appropriate interventions in the flowsheet.   Outcome: Progressing Towards Goal  Note: Fall Risk Interventions:            Medication Interventions: Bed/chair exit alarm    Elimination Interventions: Call light in reach, Bed/chair exit alarm

## 2021-10-23 NOTE — PROGRESS NOTES
Problem: Mobility Impaired (Adult and Pediatric)  Goal: *Acute Goals and Plan of Care (Insert Text)  10/23/2021 1446 by Rosette Chavez  Outcome: Progressing Towards Goal  10/23/2021 1422 by Rosette Chavez  Outcome: Progressing Towards Goal  Note:   Pt will be I with LE HEP in 7 days. Pt will perform bed mobility with MOD IND  in 7 days. Pt will perform transfers with MIN A  in 7 days. Pt will be able to stand > 5 mins with LRAD safely with SBA in 7 days. Problem: Patient Education: Go to Patient Education Activity  Goal: Patient/Family Education  10/23/2021 1446 by Rosette Chavez  Outcome: Progressing Towards Goal  10/23/2021 1422 by Rosette Chavez  Outcome: Progressing Towards Goal  Note:   Pt will be able to initiate transfers IND x 1 person from bed to bedside commode   Pt will verbalize and demonstrate compliance with fall risk precautions/restrictions to include daily MOBILITY  in 7 days. Pt will demonstrate improvement in standing/seated static/dynamic balance from POOR to FAIR  in 7 days.

## 2021-10-23 NOTE — PROGRESS NOTES
Problem: Mobility Impaired (Adult and Pediatric)  Goal: *Acute Goals and Plan of Care (Insert Text)  Outcome: Progressing Towards Goal  Note:   Pt will be I with LE HEP in 7 days. Pt will perform bed mobility with MOD IND  in 7 days. Pt will perform transfers with MIN A  in 7 days. Pt will be able to stand > 5 mins with LRAD safely with SBA in 7 days. Problem: Patient Education: Go to Patient Education Activity  Goal: Patient/Family Education  Outcome: Progressing Towards Goal  Note:   Pt will be able to initiate transfers IND x 1 person from bed to bedside commode   Pt will verbalize and demonstrate compliance with fall risk precautions/restrictions to include daily MOBILITY  in 7 days. Pt will demonstrate improvement in standing/seated static/dynamic balance from POOR to FAIR  in 7 days.

## 2021-10-23 NOTE — WOUND CARE
IP WOUND CONSULT    [address] RECORD NUMBER:  [id number]  AGE: [de-identified] y.o. GENDER: male  : 1940  TODAY'S DATE:  10/23/2021    GENERAL     [] Follow-up   [x] New Consult    Chelsey Conroy is a [de-identified] y.o. male referred by:   [x] Physician  [] Nursing  [] Other:         PAST MEDICAL HISTORY    Past Medical History:   Diagnosis Date    High cholesterol     Hx of long term use of blood thinners     aspirin    Hypertension     Lymphedema     Vascular disease         PAST SURGICAL HISTORY    Past Surgical History:   Procedure Laterality Date    HX OTHER SURGICAL      repair of tendon achilles    HX OTHER SURGICAL  2012    aortic anrysm 6/> cm diamater       FAMILY HISTORY    Family History   Problem Relation Age of Onset    Hypertension Mother          ALLERGIES    No Known Allergies    MEDICATIONS    No current facility-administered medications on file prior to encounter. Current Outpatient Medications on File Prior to Encounter   Medication Sig Dispense Refill    metoprolol succinate (TOPROL-XL) 50 mg XL tablet Take 1 Tablet by mouth daily.  cloNIDine HCL (CATAPRES) 0.2 mg tablet Take 0.1 mg by mouth daily (after breakfast).  cloNIDine HCL (CATAPRES) 0.2 mg tablet Take 0.2 mg by mouth daily (after dinner).  triamcinolone acetonide (KENALOG) 0.5 % ointment Apply  to affected area. use thin layer      aspirin delayed-release 81 mg tablet Take 81 mg by mouth daily.  atorvastatin (LIPITOR) 40 mg tablet Take 40 mg by mouth daily.  mv-mn/folic JMDQ/EIZHCS/KCG965 (ANTHONY MULTIVITAMIN FOR MEN PO) Take 1 Tablet by mouth daily.            [unfilled]  Visit Vitals  /65 (BP 1 Location: Left upper arm, BP Patient Position: At rest)   Pulse 86   Temp 98.4 °F (36.9 °C)   Resp 22   Ht 6' (1.829 m)   Wt 113.4 kg (250 lb)   SpO2 96%   BMI 33.91 kg/m²       ASSESSMENT     Wound Identification & Type: Possible chronic tissue injury to buttocks and posterior proximal thighs (patient is recliner-bound at home); skin tear to LLA; and possible vascular related skin issues to both feet. Dressing change: zinc paste applied to buttocks. Left feet open to air until order received from Dr. Silas Hurst for one-time dressing of Opticell Ag Rope between toes, see  Dressing order (informed Thuy Smith Rn)  Verbal consent for picture:    Contributing Factors: anticoagulation therapy, venous stasis, lymphedema, chronic pressure, decreased mobility, shear force, incontinence of stool, incontinence of urine, obesity and decreased tissue oxygenation    Wound Leg lower Anterior; Left (Active)   Wound Etiology Other (Comment) 10/21/21 1517   Dressing Status Other (Comment) 10/21/21 1517   Cleansed Not cleansed 10/21/21 1517   Dressing/Treatment Open to air 10/21/21 1517   Drainage Amount None 10/21/21 1517   Wound Odor None 10/21/21 1517   Number of days: 2       Wound Leg lower Right (Active)   Wound Etiology Other (Comment) 10/21/21 1517   Dressing Status Other (Comment) 10/21/21 1517   Cleansed Not cleansed 10/21/21 1517   Dressing/Treatment Open to air 10/21/21 1517   Drainage Amount None 10/21/21 1517   Wound Odor None 10/21/21 1517   Number of days: 2       Wound Foot Anterior; Left draining wound 10/21/21 (Active)   Wound Image   10/23/21 1355   Wound Etiology Other (Comment) 10/23/21 1355   Dressing Status Clean;Dry; Intact 10/21/21 1515   Cleansed Other (Comment) 10/23/21 1355   Dressing/Treatment Open to air 10/23/21 1355   Offloading for Diabetic Foot Ulcers Offloading boot 10/23/21 1355   Wound Assessment Pink/red 10/23/21 1355   Drainage Amount Small 10/23/21 1355   Drainage Description Thick; Other (Comment) 10/23/21 1355   Wound Odor Moderate 10/23/21 1355   Gabrielle-Wound/Incision Assessment Dry/flaky;Fragile 10/23/21 1355   Number of days: 2       Wound Foot Anterior;Right draining wound 10/21/21 (Active)   Wound Image   10/23/21 1401   Wound Etiology Other (Comment) 10/23/21 1401 Dressing Status Clean;Dry; Intact 10/21/21 1516   Cleansed Other (Comment) 10/23/21 1401   Dressing/Treatment Open to air 10/23/21 1401   Offloading for Diabetic Foot Ulcers Offloading boot 10/23/21 1401   Wound Assessment Pink/red 10/23/21 1401   Drainage Amount Small 10/23/21 1401   Drainage Description Thick; Other (Comment) 10/23/21 1401   Wound Odor Moderate 10/23/21 1401   Gabrielle-Wound/Incision Assessment Dry/flaky;Fragile 10/23/21 1401   Number of days: 2       Wound Arm lower Anterior;Left;Proximal (Active)   Wound Image   10/23/21 1402   Wound Etiology Skin Tear 10/23/21 1402   Dressing Status New dressing applied 10/23/21 1402   Cleansed Cleansed with saline 10/23/21 1402   Dressing/Treatment Non-adherent; Roll gauze;Tape/Soft cloth adhesive tape; Honey gel/honey paste 10/23/21 1402   Dressing Change Due 10/25/21 10/23/21 1402   Wound Length (cm) 1 cm 10/23/21 1402   Wound Width (cm) 3 cm 10/23/21 1402   Wound Depth (cm) 0.1 cm 10/23/21 1402   Wound Surface Area (cm^2) 3 cm^2 10/23/21 1402   Wound Volume (cm^3) 0.3 cm^3 10/23/21 1402   Wound Assessment Bleeding;Pink/red 10/23/21 1402   Drainage Amount Small 10/23/21 1402   Drainage Description Sanguineous 10/23/21 1402   Wound Odor None 10/23/21 1402   Gabrielle-Wound/Incision Assessment Dry/flaky;Fragile 10/23/21 1402   Edges Flat/open edges; Unattached edges 10/23/21 1402   Wound Thickness Description Partial thickness 10/23/21 1402   Number of days: 2       Wound Thigh Proximal;Right;Dorsal non-blanchable redness (Active)   Wound Image   10/23/21 1404   Wound Etiology Other (Comment) 10/23/21 1404   Cleansed Other (Comment) 10/23/21 1404   Dressing/Treatment Zinc paste 10/23/21 1404   Wound Assessment Pink/red;Purple/maroon 10/23/21 1404   Drainage Amount None 10/23/21 1404   Wound Odor None 10/23/21 1404   Gabrielle-Wound/Incision Assessment Fragile 10/23/21 1404   Number of days: 2       Wound Buttocks Left non-blachable redness 10/21/21 (Active)   Wound Image 10/23/21 1406   Wound Etiology Other (Comment) 10/23/21 1406   Dressing Status Other (Comment) 10/21/21 1521   Cleansed Other (Comment) 10/23/21 1406   Dressing/Treatment Zinc paste 10/23/21 1406   Wound Assessment Purple/maroon 10/23/21 1406   Drainage Amount None 10/23/21 1406   Wound Odor None 10/23/21 1406   Gabrielle-Wound/Incision Assessment Fragile 10/23/21 1406   Number of days: 2          PLAN     Skin Care & Pressure Relief Recommendations  Speciality bed P500 Low Air Loss  Minimize layers of linen  Turn/reposition approximately every 2 hours  Pillow wedges  Manage incontinence   Promote continence; Skin Protective lotion/cream to buttocks and sacrum daily and as needed with incontinence care  Offloading boots    [address] 14  Blood Glucose: 115 on 10/23/21                             Albumin: 2.3 on 10/21/21  WBCs: 11.6 on 10/23/21    Support Surface: Currently on foam mattress. EVS will clean and deliver a P500 Low Air Loss. Informed Gracie Ferraro RN. Physician/Provider notified: Dr. Bertin Lockhart  Recommendations: Dr. Bertin Lockhart will round on patient tomorrow evening. Received a one-time order via telephone from Dr. Bertin Lockhart to apply Opticell Ag rope between toes for drainage and maceration, see dressing order. Per patient, his [address] applies Unna Boots to BLEs approximately 2-3 times per week. Per patient, he is recliner-bound at home. Darkened areas to buttocks and posterior proximal thighs may be chronic tissue injury. WCN will continue to monitor weekly for changes in color or integrity. Maintain PrimoFit to manage  incontinence. Apply zinc paste TID and prn for soiling to buttocks, posterior proximal thighs, and gluteal folds / cleft. Use foam wedge to turn q2h at 30 degree angle or more to offload sacrum and buttocks. Maintain heel boots on both feet while in bed to offload heels. Per patient, heels are tender to touch. Will continue to follow.           Discharge Wound Care Needs: TBD    Teaching completed with:   [] Patient           [] Family member       [] Caregiver          [] Nursing  [] Other    Patient/Caregiver Teaching:  Level of patient/caregiver understanding able to:   [] Indicates understanding       [] Needs reinforcement  [] Unsuccessful      [] Verbal Understanding  [] Demonstrated understanding       [] No evidence of learning  [] Refused teaching         [] N/A       Electronically signed by Eryn Otero RN on 10/23/2021 at 2:11 PM

## 2021-10-24 NOTE — PROGRESS NOTES
Infectious Diseases Progress Note  Jacob Kessler MD  [phone]   Date:10/24/2021       Room:[facility]  Patient Name:Lewis Carmen     YOB: 1940     Age:81 y.o. 81M with history of smoking, hypertension, dyslipidemia, repaired AAA, obesity. Chronic venous stasis of the lower extremities with recurrent inflammation.     2018 I saw him at [facility] for complications of venous stasis dermatitis. At that hospital visit right leg wound cultures revealed MSSA and PSEUDOMONAS, managed with a two weeks course of levofloxacin and doxycycline at that time.     10/21/21 presents to hospital with a complaint of bilateral foot wound checks and lower extremity edema. Associated with bilateral lower extremity pain. EMS found him hypoxic and managed with supplemental oxygen. During the ED course chest x-ray showing pulmonary vascular congestion without overt pulmonary edema as well as left basilar airspace disease most likely atelectasis. Started on IV diuresis. Admitted to hospital and podiatry and wound care have been asked to see him for his condition.     Wound cultures from the right and left foot suggestive of multiple organisms and I have been asked to see him in consultation. Subjective    Subjective:  Symptoms:  Stable. No shortness of breath or chest pain. Review of Systems   Constitutional: Negative for fever. Respiratory: Negative for shortness of breath. Cardiovascular: Negative for chest pain. All other systems reviewed and are negative. Objective         Vitals Last 24 Hours:  TEMPERATURE:  Temp  Av.1 °F (36.7 °C)  Min: 97.3 °F (36.3 °C)  Max: 99 °F (37.2 °C)  RESPIRATIONS RANGE: Resp  Av  Min: 20  Max: 22  PULSE OXIMETRY RANGE: SpO2  Av %  Min: 94 %  Max: 96 %  PULSE RANGE: Pulse  Av.3  Min: 86  Max: 118  BLOOD PRESSURE RANGE: Systolic (60ZHJ), VCF:265 , Min:118 , CCI:642   ; Diastolic (25BKZ), KBS:58, Min:58, Max:71    I/O (24Hr):     Intake/Output Summary (Last 24 hours) at 10/24/2021 1254  Last data filed at 10/24/2021 0557  Gross per 24 hour   Intake    Output 850 ml   Net -850 ml     Objective:  General Appearance:  Comfortable. Vital signs: (most recent): Blood pressure 118/71, pulse (!) 103, temperature 99 °F (37.2 °C), resp. rate 22, height 6' (1.829 m), weight 113.4 kg (250 lb), SpO2 96 %. Vital signs are normal.  No fever. Constitutional: No acute distress  Skin: Extremities and face reveal no rashes, lower feet wrapped in a dressing which are clean, dry, intact, chronic lymphedema noted in legs bilaterally  HEENT: Sclerae anicteric. Extra-occular muscles are intact. No oral ulcers. The neck is supple and no masses. Cardiovascular: RRR  Respiratory:  Clear breath sounds bilaterally with no wheezes, rales, or rhonchi. GI: Abdomen nondistended, soft, and nontender. Normal active bowel sounds. Musculoskeletal: No pitting edema of the lower legs. Able to move all ext  Neurological:  Patient is alert and oriented. Cranial nerves II-XII grossly intact  Psychiatric: Mood appears appropriate     Labs/Imaging/Diagnostics    Labs:  CBC:  Recent Labs     10/24/21  0601 10/23/21  0513 10/22/21  0615   WBC 11.5* 11.6* 12.7*   RBC 3.58* 3.66* 3.92*   HGB 10.0* 10.4* 10.8*   HCT 34.1* 35.2* 38.1   MCV 95.3 96.2 97.2   RDW 15.1* 15.1* 15.3*    226 263     CHEMISTRIES:  Recent Labs     10/24/21  0601 10/23/21  0513 10/22/21  0615    146* 146*   K 4.0 4.1 4.6   * 112* 113*   CO2 27 27 28   BUN 71* 59* 50*   CA 8.6 8.3* 8.6   PT/INR:No results for input(s): INR, INREXT in the last 72 hours. No lab exists for component: PROTIME  APTT:No results for input(s): APTT in the last 72 hours. LIVER PROFILE:No results for input(s): AST, ALT in the last 72 hours. No lab exists for component: JUSTICE Ramesh MaPHOS  Lab Results   Component Value Date/Time    ALT (SGPT) 24 10/21/2021 09:20 AM    AST (SGOT) 37 10/21/2021 09:20 AM    Alk. phosphatase 119 (H) 10/21/2021 09:20 AM    Bilirubin, total 0.6 10/21/2021 09:20 AM       Imaging Last 24 Hours:  XR CHEST PORT    Result Date: 10/23/2021  Portable chest, 1533 hours, compared with 10/21/2021. Leftward patient rotation. Calcified thoracic aorta. Stable enlargement of cardiopericardial silhouette. Prominent adeline/central pulmonary vessels. Worsening bilateral perihilar and basilar airspace opacities, consistent with pulmonary edema but correlate with any clinical suspicion of pneumonia. No pneumothorax or other acute change. Possible pleural effusions. Worsening pulmonary edema, suggesting CHF or volume overload. Assessment//Plan   Active Problems:    Multiple open wounds of foot (10/21/2021)      Bilateral leg edema (10/22/2021)      Acute kidney injury ([facility] [address] (10/23/2021)      Assessment & Plan  81M with history of smoking, hypertension, dyslipidemia, repaired AAA, obesity. Chronic venous stasis of the lower extremities with recurrent inflammation.     July 2018 I saw him at [facility] for complications of venous stasis dermatitis. At that hospital visit right leg wound cultures revealed MSSA and PSEUDOMONAS, managed with a two weeks course of levofloxacin and doxycycline at that time.     10/21/21 presents to hospital with a complaint of bilateral foot wound checks and lower extremity edema. Associated with bilateral lower extremity pain. EMS found him hypoxic and managed with supplemental oxygen. During the ED course chest x-ray showing pulmonary vascular congestion without overt pulmonary edema as well as left basilar airspace disease most likely atelectasis. Started on IV diuresis.  Admitted to hospital and podiatry and wound care have been asked to see him for his condition.     Wound cultures from the right and left foot suggestive of multiple organisms and I have been asked to see him in consultation.     MICROBIOLOGY     7/28/18            R leg    MSSA Pseudomonas     10/21/21          Blood   Negative so far     10/21/21          R foot   Heavy Gram Negative Rods MULTIPLE COLONY TYPES                                      checking for possible BETA STREPTOCOCCUS     10/21/21          L foot   Heavy Gram Negative Rods MULTIPLE COLONY TYPES                                      Heavy Streptococci, beta hemolyticgroup C     ASSESSMENT AND RECOMMENDATIONS     1) Chronic venous stasis of the bilateral lower extremities further complicated by complex soft tissue infection.                 Agree with present vancomycin and Zosyn for now pending further culture data              Wound care inpatient and outpatient        Electronically signed by Ling Dai MD on 10/24/2021 at 12:54 PM

## 2021-10-24 NOTE — CONSULTS
Nephrology Consult    Patient: Mar Sanchez MRN: [id number]  SSN: [id number]    YOB: 1940  Age: [de-identified] y.o. Sex: male      Subjective:   Reason for the consultation. Acute kidney injury on chronic kidney disease  HPI. The patient is 60-year-old man with underlying history of chronic kidney disease and follow-up with Dr. Yancy Hill, morbid obesity, hypertension, chronic lymphedema, chronic bilateral lower extremity venous stasis was admitted with lower extremity wounds, edema and worsening shortness of breath, hypoxic respiratory failure, initially was on 15 L nonrebreather and now via nasal cannula, chest x-ray showed bilateral pulmonary edema along with severely elevated BNP. On admission creatinine was 2.8 and has gone up to 3.8 today which prompted a nephrology consultation. 2D echocardiogram is pending.     Past Medical History:   Diagnosis Date    High cholesterol     Hx of long term use of blood thinners     aspirin    Hypertension     Lymphedema     Vascular disease      Past Surgical History:   Procedure Laterality Date    HX OTHER SURGICAL      repair of tendon achilles    HX OTHER SURGICAL  2012    aortic anrysm 6/> cm diamater      Family History   Problem Relation Age of Onset    Hypertension Mother      Social History     Tobacco Use    Smoking status: Former Smoker     Packs/day: 2.00     Years: 50.00     Pack years: 100.00     Quit date:      Years since quittin.8    Smokeless tobacco: Never Used   Substance Use Topics    Alcohol use: Not on file      Current Facility-Administered Medications   Medication Dose Route Frequency Provider Last Rate Last Admin    [START ON 10/25/2021] VANCOMYCIN Draw Random at 0400 ON 10/25/21   Other ONCE Honey Yang MD        furosemide (LASIX) injection 40 mg  40 mg IntraVENous BID Vanessa Haddad MD        ammonium lactate (AMLACTIN) 12 % cream   Topical DAILY Bia Harp PA-C   Given at 10/24/21 0846    DUE: Vancomycin Level 10/24 at 0400   Other ONCE Tanner Beasley MD        zinc oxide-white petrolatum 17-57 % topical paste   Topical TID KYLE Padilla-C   Given at 10/24/21 0847    albumin human 25% (BUMINATE) solution 12.5 g  12.5 g IntraVENous Q6H Jenifer Bolivar MD   12.5 g at 10/24/21 0847    heparin (porcine) injection 5,000 Units  5,000 Units SubCUTAneous Q8H Fidelia Valencia PA-C   5,000 Units at 10/24/21 0846    sodium chloride (NS) flush 5-40 mL  5-40 mL IntraVENous Q8H Suellen Baars, PA-C   10 mL at 10/23/21 2208    sodium chloride (NS) flush 5-40 mL  5-40 mL IntraVENous PRN Suellen Baars, PA-C        acetaminophen (TYLENOL) tablet 650 mg  650 mg Oral Q6H PRN Suellen Baars, PA-C        Or    acetaminophen (TYLENOL) suppository 650 mg  650 mg Rectal Q6H PRN Suellen Bareji, PA-C        polyethylene glycol (MIRALAX) packet 17 g  17 g Oral DAILY PRN Suellen Baars, PA-C        ondansetron (ZOFRAN ODT) tablet 4 mg  4 mg Oral Q8H PRN Suellen Baars, PA-C        Or    ondansetron [facility]) injection 4 mg  4 mg IntraVENous Q6H PRN Suellen Baars, PA-C        aspirin delayed-release tablet 81 mg  81 mg Oral DAILY Suellen Baars, PA-C   81 mg at 10/24/21 0845    atorvastatin (LIPITOR) tablet 40 mg  40 mg Oral DAILY Suellen Baars, PA-C   40 mg at 10/24/21 0845    [Held by provider] furosemide (LASIX) injection 40 mg  40 mg IntraVENous BID Suellen Baars, PA-C   40 mg at 10/23/21 0469    hydrALAZINE (APRESOLINE) 20 mg/mL injection 20 mg  20 mg IntraVENous Q6H PRN Suellen Baars, PA-C        VANCOMYCIN INFORMATION NOTE 1 Each  1 Each Other Rx Dosing/Monitoring Jg Redding NP        budesonide-formoteroL (SYMBICORT) 160-4.5 mcg/actuation HFA inhaler 2 Puff  2 Puff Inhalation BID RT Suellen Baars, PA-C   2 Puff at 10/24/21 0758    predniSONE (DELTASONE) tablet 40 mg  40 mg Oral DAILY WITH BREAKFAST Fidelia Valencia PA-C   40 mg at 10/24/21 0845    influenza vaccine 2021-22 (6 mos+)(PF) (FLUARIX/FLULAVAL/FLUZONE QUAD) injection 0.5 mL  1 Each IntraMUSCular PRIOR TO DISCHARGE Marc Keller PA-C        piperacillin-tazobactam (ZOSYN) 3.375 g in 0.9% sodium chloride (MBP/ADV) 100 mL MBP  3.375 g IntraVENous Q8H Mandy Atkins MD 25 mL/hr at 10/24/21 1026 3.375 g at 10/24/21 1026        No Known Allergies    Review of Systems:  A comprehensive review of systems was negative except for that written in the History of Present Illness. Objective:     Vitals:    10/23/21 2234 10/24/21 0549 10/24/21 0710 10/24/21 0758   BP: 126/69 (!) 121/58 118/71    Pulse: 86 (!) 118 (!) 103    Resp: 20 20 22    Temp: 97.5 °F (36.4 °C) 97.3 °F (36.3 °C) 99 °F (37.2 °C)    SpO2: 94% 94% 96% 96%   Weight:       Height:            Physical Exam:  General: NAD  Eyes: sclera anicteric  Oral Cavity: No thrush or ulcers  Neck: no JVD  Chest: Fair bilateral air entry  Heart: normal sounds  Abdomen: soft and non tender   : no cedeño  Lower Extremities:  Edema +  Skin: no rash  Neuro: intact  Psychiatric: non-depressed            Assessment:     Hospital Problems  Never Reviewed        Codes Class Noted POA    [facility] ICD-10-CM: N17.9  ICD-9-CM: 584.9  10/23/2021 Unknown        Bilateral leg edema ICD-10-CM: R60.0  ICD-9-CM: 782.3  10/22/2021 Unknown        Multiple open wounds of foot ICD-10-CM: S91.309A  ICD-9-CM: 892.0  10/21/2021 Unknown              Plan:   1 acute kidney injury on underlying chronic kidney disease stage unknown.  -Etiology is prerenal azotemia from cardiorenal.    -On admission BUN/creatinine 47/2.8 and has gone up to 71/3.8.    -Clinically volume up as evident from lower extremity edema chest.    -Chest x-ray showed edema also. - I will start him on gentle diuresis with IV Lasix 40 mg twice a day.     -No indication for the IV fluids.    -I will order a kidney ultrasound.    -He is currently not on any potential nephrotoxins apart from vancomycin and vancomycin levels are not in the toxic range. 2.  Hypotension.    -He was noticed to have lowish blood pressure initially.    -I will discontinue amlodipine. 3.  CHF. -He is admitted with decompensated CHF.    -2D echocardiogram has been ordered.    -On admission he was with hypoxic respiratory failure and lower extremity edema.    -I will start him on gentle diuresis. 4.  Bilateral lower extremity wounds.    -On IV antibiotics as per ID recommendations. 5.  DVT for prophylaxis. Agreed with unfractionated subcu heparin.     Thank you for the consultation    Signed By: Christian Hernandez MD     October 24, 2021

## 2021-10-24 NOTE — PROGRESS NOTES
Hospitalist Progress Note    Subjective:   Daily Progress Note: 10/24/2021 2:05 PM    Hospital Course:  Pt admitted for complaints of bilateral foot wounds, and leg edema. Xray of right foot showing osteopenia, Infectious disease and wound RN consulted for recommendations. Right and Left foot wound culture growing heavy streptococci and GPC, with possible enterococcus, Gram negative rods.        Subjective: Pt seen in room, no complaints of pain    Current Facility-Administered Medications   Medication Dose Route Frequency    [START ON 10/25/2021] VANCOMYCIN Draw Random at 0400 ON 10/25/21   Other ONCE    furosemide (LASIX) injection 40 mg  40 mg IntraVENous BID    ammonium lactate (AMLACTIN) 12 % cream   Topical DAILY    DUE: Vancomycin Level 10/24 at 0400   Other ONCE    zinc oxide-white petrolatum 17-57 % topical paste   Topical TID    albumin human 25% (BUMINATE) solution 12.5 g  12.5 g IntraVENous Q6H    heparin (porcine) injection 5,000 Units  5,000 Units SubCUTAneous Q8H    sodium chloride (NS) flush 5-40 mL  5-40 mL IntraVENous Q8H    sodium chloride (NS) flush 5-40 mL  5-40 mL IntraVENous PRN    acetaminophen (TYLENOL) tablet 650 mg  650 mg Oral Q6H PRN    Or    acetaminophen (TYLENOL) suppository 650 mg  650 mg Rectal Q6H PRN    polyethylene glycol (MIRALAX) packet 17 g  17 g Oral DAILY PRN    ondansetron (ZOFRAN ODT) tablet 4 mg  4 mg Oral Q8H PRN    Or    ondansetron (ZOFRAN) injection 4 mg  4 mg IntraVENous Q6H PRN    aspirin delayed-release tablet 81 mg  81 mg Oral DAILY    atorvastatin (LIPITOR) tablet 40 mg  40 mg Oral DAILY    [Held by provider] furosemide (LASIX) injection 40 mg  40 mg IntraVENous BID    hydrALAZINE (APRESOLINE) 20 mg/mL injection 20 mg  20 mg IntraVENous Q6H PRN    VANCOMYCIN INFORMATION NOTE 1 Each  1 Each Other Rx Dosing/Monitoring    budesonide-formoteroL (SYMBICORT) 160-4.5 mcg/actuation HFA inhaler 2 Puff  2 Puff Inhalation BID RT    predniSONE (DELTASONE) tablet 40 mg  40 mg Oral DAILY WITH BREAKFAST    influenza vaccine  (6 mos+)(PF) (FLUARIX/FLULAVAL/FLUZONE QUAD) injection 0.5 mL  1 Each IntraMUSCular PRIOR TO DISCHARGE    piperacillin-tazobactam (ZOSYN) 3.375 g in 0.9% sodium chloride (MBP/ADV) 100 mL MBP  3.375 g IntraVENous Q8H        Review of Systems:    Review of Systems   Constitutional: Negative for chills and fever. Respiratory: Negative for cough. Cardiovascular: Negative for chest pain and palpitations. Gastrointestinal: Negative for heartburn, nausea and vomiting. Genitourinary: Negative for dysuria and urgency. Musculoskeletal: Negative for back pain and myalgias. Neurological: Negative for dizziness and headaches. Objective:     Visit Vitals  /71 (BP 1 Location: Right upper arm, BP Patient Position: At rest)   Pulse (!) 103   Temp 99 °F (37.2 °C)   Resp 22   Ht 6' (1.829 m)   Wt 113.4 kg (250 lb)   SpO2 96%   BMI 33.91 kg/m²    O2 Flow Rate (L/min): 2 l/min O2 Device: None (Room air)    Temp (24hrs), Av.9 °F (36.6 °C), Min:97.3 °F (36.3 °C), Max:99 °F (37.2 °C)      No intake/output data recorded. 10/22 1901 - 10/24 0700  In: -   Out: 1300 [Urine:1300]    PHYSICAL EXAM:    Physical Exam  Constitutional:       General: He is not in acute distress. Appearance: He is obese. HENT:      Mouth/Throat:      Dentition: Abnormal dentition. Dental caries present. Cardiovascular:      Rate and Rhythm: Normal rate and regular rhythm. Pulses: Normal pulses. Heart sounds: Normal heart sounds. Pulmonary:      Effort: Pulmonary effort is normal.      Breath sounds: Normal breath sounds. Abdominal:      General: Bowel sounds are normal.      Palpations: Abdomen is soft. Genitourinary:     Comments: Pure wick, clear yellow urine. Skin:     General: Skin is warm and dry.       Comments: Bilateral feet with dressings, sacral ulcer   Neurological:      Mental Status: He is oriented to person, place, and time. Data Review    Recent Results (from the past 24 hour(s))   METABOLIC PANEL, BASIC    Collection Time: 10/24/21  6:01 AM   Result Value Ref Range    Sodium 144 136 - 145 mmol/L    Potassium 4.0 3.5 - 5.1 mmol/L    Chloride 111 (H) 97 - 108 mmol/L    CO2 27 21 - 32 mmol/L    Anion gap 6 5 - 15 mmol/L    Glucose 111 (H) 65 - 100 mg/dL    BUN 71 (H) 6 - 20 mg/dL    Creatinine 3.89 (H) 0.70 - 1.30 mg/dL    BUN/Creatinine ratio 18 12 - 20      GFR est AA 18 (L) >60 ml/min/1.73m2    GFR est non-AA 15 (L) >60 ml/min/1.73m2    Calcium 8.6 8.5 - 10.1 mg/dL   CBC WITH AUTOMATED DIFF    Collection Time: 10/24/21  6:01 AM   Result Value Ref Range    WBC 11.5 (H) 4.1 - 11.1 K/uL    RBC 3.58 (L) 4.10 - 5.70 M/uL    HGB 10.0 (L) 12.1 - 17.0 g/dL    HCT 34.1 (L) 36.6 - 50.3 %    MCV 95.3 80.0 - 99.0 FL    MCH 27.9 26.0 - 34.0 PG    MCHC 29.3 (L) 30.0 - 36.5 g/dL    RDW 15.1 (H) 11.5 - 14.5 %    PLATELET 059 608 - 392 K/uL    MPV 11.2 8.9 - 12.9 FL    NRBC 0.0 0.0  WBC    ABSOLUTE NRBC 0.00 0.00 - 0.01 K/uL    NEUTROPHILS 79 (H) 32 - 75 %    LYMPHOCYTES 12 12 - 49 %    MONOCYTES 7 5 - 13 %    EOSINOPHILS 1 0 - 7 %    BASOPHILS 0 0 - 1 %    IMMATURE GRANULOCYTES 1 (H) 0 - 0.5 %    ABS. NEUTROPHILS 9.2 (H) 1.8 - 8.0 K/UL    ABS. LYMPHOCYTES 1.3 0.8 - 3.5 K/UL    ABS. MONOCYTES 0.8 0.0 - 1.0 K/UL    ABS. EOSINOPHILS 0.1 0.0 - 0.4 K/UL    ABS. BASOPHILS 0.0 0.0 - 0.1 K/UL    ABS. IMM. GRANS. 0.1 (H) 0.00 - 0.04 K/UL    DF AUTOMATED     C REACTIVE PROTEIN, QT    Collection Time: 10/24/21  6:01 AM   Result Value Ref Range    C-Reactive protein 3.70 (H) 0.00 - 0.60 mg/dL   VANCOMYCIN, RANDOM    Collection Time: 10/24/21  6:01 AM   Result Value Ref Range    Vancomycin, random 16.0 ug/mL       XR CHEST PORT   Final Result   Worsening pulmonary edema, suggesting CHF or volume overload. XR CHEST PORT   Final Result      XR FOOT LT AP/LAT   Final Result   No radiographic evidence of osteomyelitis.       XR FOOT RT AP/LAT   Final Result   Nonspecific nonfocal osteopenia. US RETROPERITONEUM COMP    (Results Pending)   US RETROPERITONEUM COMP    (Results Pending)       Active Problems:    Multiple open wounds of foot (10/21/2021)      Bilateral leg edema (10/22/2021)      Acute kidney injury ([address] (10/23/2021)        Assessment/Plan:   1. Bilateral foot ulcers- secondary to chronic venous stasis, ID following,  Cultures w/ multiorganisms, on vancomycin, zosyn, final pending. Will consult vascular surgery. 2.  Acute on chronic kidney disease- nephrology consulted, renal US ordered  Repeat bmp in am    3. CHF- chest xray showing vascular congestion, echocardiogram ordered, BNP >35,000  IV lasix bid.  Monitor I &0.    4. Generalized weakness- OT/PT evaluation        DVT Prophylaxis: heparin sq  Code Status:  Full Code  POA: TIEN HERRON [facility] caregiver Sajan Cabrera [phone] [facility] discussed with:   _____patient, staff nurse__________________________________________________________    Rubbie Lennox, NP

## 2021-10-24 NOTE — PROGRESS NOTES
Day:4 Vancomycin Update    Vancomycin Random from AM Draw resulted at 16.0 after receiving Vanc 1000 mg x 1 yesterday. Renal function continues to decline. Wound culture shows GNR and GPC. Will order Vancomycin 750 mg x 1 dose to be given at 1100 and draw a random level tomorrow morning.

## 2021-10-25 NOTE — PROGRESS NOTES
Hospitalist Progress Note    Subjective:   Daily Progress Note: 10/25/2021 3:05 PM    Hospital Course:  Hospital Course:  Pt admitted for complaints of bilateral foot wounds, and leg edema. Xray of right foot showing osteopenia, Infectious disease and wound RN consulted for recommendations. Right and Left foot wound culture growing heavy streptococci, myroides, proteun, morganella, pasteurella. ID consulted for IV antibiotic management.      Subjective:Pt seen in room, no complaints of pain    Current Facility-Administered Medications   Medication Dose Route Frequency    furosemide (LASIX) injection 40 mg  40 mg IntraVENous BID    metoprolol succinate (TOPROL-XL) XL tablet 50 mg  50 mg Oral DAILY    dilTIAZem IR (CARDIZEM) tablet 30 mg  30 mg Oral TIDAC    ammonium lactate (AMLACTIN) 12 % cream   Topical DAILY    zinc oxide-white petrolatum 17-57 % topical paste   Topical TID    heparin (porcine) injection 5,000 Units  5,000 Units SubCUTAneous Q8H    sodium chloride (NS) flush 5-40 mL  5-40 mL IntraVENous Q8H    sodium chloride (NS) flush 5-40 mL  5-40 mL IntraVENous PRN    acetaminophen (TYLENOL) tablet 650 mg  650 mg Oral Q6H PRN    Or    acetaminophen (TYLENOL) suppository 650 mg  650 mg Rectal Q6H PRN    polyethylene glycol (MIRALAX) packet 17 g  17 g Oral DAILY PRN    ondansetron (ZOFRAN ODT) tablet 4 mg  4 mg Oral Q8H PRN    Or    ondansetron (ZOFRAN) injection 4 mg  4 mg IntraVENous Q6H PRN    aspirin delayed-release tablet 81 mg  81 mg Oral DAILY    atorvastatin (LIPITOR) tablet 40 mg  40 mg Oral DAILY    hydrALAZINE (APRESOLINE) 20 mg/mL injection 20 mg  20 mg IntraVENous Q6H PRN    budesonide-formoteroL (SYMBICORT) 160-4.5 mcg/actuation HFA inhaler 2 Puff  2 Puff Inhalation BID RT    influenza vaccine 2021-22 (6 mos+)(PF) (FLUARIX/FLULAVAL/FLUZONE QUAD) injection 0.5 mL  1 Each IntraMUSCular PRIOR TO DISCHARGE    piperacillin-tazobactam (ZOSYN) 3.375 g in 0.9% sodium chloride (MBP/ADV) 100 mL MBP  3.375 g IntraVENous Q8H        Review of Systems:    ROS   Review of Systems   Constitutional: Negative for chills and fever. Respiratory: Negative for cough. Cardiovascular: Negative for chest pain and palpitations. Gastrointestinal: Negative for heartburn, nausea and vomiting. Genitourinary: Negative for dysuria and urgency. Musculoskeletal: Negative for back pain and myalgias. Neurological: Negative for dizziness and headaches. Objective:     Visit Vitals  BP (!) 140/60 (BP 1 Location: Right upper arm, BP Patient Position: At rest;Lying)   Pulse 71 Comment: Simultaneous filing. User may not have seen previous data. Temp 97.4 °F (36.3 °C)   Resp 20   Ht 6' (1.829 m)   Wt 113.4 kg (250 lb)   SpO2 91%   BMI 33.91 kg/m²    O2 Flow Rate (L/min): 1 l/min O2 Device: Nasal cannula    Temp (24hrs), Av.8 °F (36.6 °C), Min:97.4 °F (36.3 °C), Max:98.3 °F (36.8 °C)      No intake/output data recorded. 10/23 1901 - 10/25 0700  In: -   Out: 1250 [Urine:1250]    PHYSICAL EXAM:    Physical Exam   Constitutional:       General: He is not in acute distress. Appearance: He is obese. HENT:      Mouth/Throat:      Dentition: Abnormal dentition. Dental caries present. Cardiovascular:      Rate and Rhythm: Normal rate and regular rhythm. Pulses: Normal pulses. Heart sounds: Normal heart sounds. Pulmonary:      Effort: Pulmonary effort is normal.      Breath sounds: Normal breath sounds. Abdominal:      General: Bowel sounds are normal.      Palpations: Abdomen is soft. Genitourinary:     Comments: voiding  clear yellow urine. Skin:     General: Skin is warm and dry. Comments: Bilateral feet with dressings, sacrum with redness  Neurological:      Mental Status: He is oriented to person, place, and time.      Data Review    Recent Results (from the past 24 hour(s))   Medhat Pompa    Collection Time: 10/25/21  5:02 AM   Result Value Ref Range    Vancomycin,trough 17.5 (H) 5.0 - 10.0 ug/mL    Reported dose date 0      Reported dose: 0 Units   RENAL FUNCTION PANEL    Collection Time: 10/25/21  5:02 AM   Result Value Ref Range    Sodium 145 136 - 145 mmol/L    Potassium 4.1 3.5 - 5.1 mmol/L    Chloride 111 (H) 97 - 108 mmol/L    CO2 29 21 - 32 mmol/L    Anion gap 5 5 - 15 mmol/L    Glucose 105 (H) 65 - 100 mg/dL    BUN 88 (H) 6 - 20 mg/dL    Creatinine 3.52 (H) 0.70 - 1.30 mg/dL    BUN/Creatinine ratio 25 (H) 12 - 20      GFR est AA 20 (L) >60 ml/min/1.73m2    GFR est non-AA 17 (L) >60 ml/min/1.73m2    Calcium 8.6 8.5 - 10.1 mg/dL    Phosphorus 3.3 2.6 - 4.7 mg/dL    Albumin 2.3 (L) 3.5 - 5.0 g/dL   CBC W/O DIFF    Collection Time: 10/25/21  5:02 AM   Result Value Ref Range    WBC 11.6 (H) 4.1 - 11.1 K/uL    RBC 3.41 (L) 4.10 - 5.70 M/uL    HGB 9.5 (L) 12.1 - 17.0 g/dL    HCT 32.5 (L) 36.6 - 50.3 %    MCV 95.3 80.0 - 99.0 FL    MCH 27.9 26.0 - 34.0 PG    MCHC 29.2 (L) 30.0 - 36.5 g/dL    RDW 14.9 (H) 11.5 - 14.5 %    PLATELET 142 731 - 079 K/uL    MPV 11.2 8.9 - 12.9 FL    NRBC 0.0 0.0  WBC    ABSOLUTE NRBC 0.00 0.00 - 0.01 K/uL   CHLORIDE, URINE RANDOM    Collection Time: 10/25/21  5:30 AM   Result Value Ref Range    Chloride,urine random 117 mmol/L   SODIUM, UR, RANDOM    Collection Time: 10/25/21  5:30 AM   Result Value Ref Range    Sodium,urine random 113 mmol/L   POTASSIUM, UR, RANDOM    Collection Time: 10/25/21  5:30 AM   Result Value Ref Range    Potassium urine, random 13 mmol/L   CREATININE, UR, RANDOM    Collection Time: 10/25/21  5:30 AM   Result Value Ref Range    Creatinine, urine 29.00 mg/dL   PROTEIN/CREATININE RATIO, URINE    Collection Time: 10/25/21  5:30 AM   Result Value Ref Range    Protein, urine random 145 (H) 0.0 - 11.9 mg/dL    Creatinine, urine 28.00 mg/dL    Protein/Creat.  urine Ratio 5.2     URINALYSIS W/MICROSCOPIC    Collection Time: 10/25/21  5:30 AM   Result Value Ref Range    Color Yellow/Straw      Appearance Clear Clear      Specific gravity 1.011 1.003 - 1.030      pH (UA) 6.0 5.0 - 8.0      Protein 100 (A) Negative mg/dL    Glucose Negative Negative mg/dL    Ketone Negative Negative mg/dL    Bilirubin Negative Negative      Blood Small (A) Negative      Urobilinogen 0.1 0.1 - 1.0 EU/dL    Nitrites Negative Negative      Leukocyte Esterase Negative Negative      WBC 0-4 0 - 4 /hpf    RBC 0-5 0 - 5 /hpf    Bacteria Negative Negative /hpf    Mucus Trace /lpf       US RETROPERITONEUM COMP   Final Result   1. Medical renal disease. No hydronephrosis. 2.  Numerous large bilateral renal cysts. 3.  Urinary bladder postvoid residual of 103 mL. XR CHEST PORT   Final Result   Worsening pulmonary edema, suggesting CHF or volume overload. XR CHEST PORT   Final Result      XR FOOT LT AP/LAT   Final Result   No radiographic evidence of osteomyelitis. XR FOOT RT AP/LAT   Final Result   Nonspecific nonfocal osteopenia. DUPLEX LOWER EXT VENOUS BILAT    (Results Pending)   LOWER EXT ART PVR MULT LEVEL SEG PRESSURES    (Results Pending)       Active Problems:    Multiple open wounds of foot (10/21/2021)      Bilateral leg edema (10/22/2021)      Acute kidney injury ([facility] [address] (10/23/2021)        Assessment/Plan:   1. Bilateral foot ulcers- secondary to chronic venous stasis, ID following,  Cultures w/ multiorganisms, on IV zosyn, vancomycin d/c'd. Lower leg venous duplex done,  Results pending, vascular following. Wound care ordered, aquacel Ag to both feet.      2. Acute on chronic kidney disease- nephrology following, creatinine 3.52, repeat renal panel in am.      3. CHF- chest xray showing vascular congestion, echocardiogram ordered, BNP >35,000  IV lasix bid. Monitor I &0. Echocardiogram pending.       4. Generalized weakness- OT/PT     5. Discharge-  CM following, possible next 48-72 hrs depending on clinical course.          DVT Prophylaxis: heparin sq  Code Status:  Full Code  POA: caregiver Danna Parada  [address] discussed with:   __________CM, patient, patient, staff nurse._____________________________________________________    Isabelaindambrosio Hernandes NP

## 2021-10-25 NOTE — CONSULTS
History and Physical 
 
Chief complaints: Leg wounds History of Presenting Illness: 
Sanchez Castillo is a [de-identified] y.o. very pleasant man currently hospitalized with a multiple medical problem including acute kidney injury with bilateral leg cellulitis. Patient is actually well-known to me I have treated this patient before for venous stasis ulcer. Last time I saw was 6 months ago. Patient examined this morning. I looked at the patient's right leg I did not look at the left leg. Right leg wounds appears to be secondary infection between the webspaces and toes there is no drainage. Patient has excellent palpable pulse. Swelling is minimal.  I redressed her wounds between the toes and web space with Aquacel Ag. Past Medical History:  
Diagnosis Date  High cholesterol  Hx of long term use of blood thinners   
 aspirin  Hypertension  Lymphedema  Vascular disease Past Surgical History:  
Procedure Laterality Date  HX OTHER SURGICAL    
 repair of tendon achilles  HX OTHER SURGICAL  2012  
 aortic anrysm 6/> cm diamater Family History Problem Relation Age of Onset  Hypertension Mother Social History Tobacco Use  Smoking status: Former Smoker Packs/day: 2.00 Years: 50.00 Pack years: 100.00 Quit date:  Years since quittin.8  Smokeless tobacco: Never Used Substance Use Topics  Alcohol use: Not on file Prior to Admission medications Medication Sig Start Date End Date Taking? Authorizing Provider  
metoprolol succinate (TOPROL-XL) 50 mg XL tablet Take 1 Tablet by mouth daily. 10/5/21  Yes Provider, Historical  
cloNIDine HCL (CATAPRES) 0.2 mg tablet Take 0.1 mg by mouth daily (after breakfast). 21  Yes Provider, Historical  
cloNIDine HCL (CATAPRES) 0.2 mg tablet Take 0.2 mg by mouth daily (after dinner). Yes Provider, Historical  
triamcinolone acetonide (KENALOG) 0.5 % ointment Apply  to affected area.  use thin layer   Yes Provider, Historical  
aspirin delayed-release 81 mg tablet Take 81 mg by mouth daily. Yes Provider, Historical  
atorvastatin (LIPITOR) 40 mg tablet Take 40 mg by mouth daily. Yes Provider, Historical  
mv-mn/folic WBHG/GUTCLJ/[id number] (ANTHONY MULTIVITAMIN FOR MEN PO) Take 1 Tablet by mouth daily. Yes Provider, Historical  
 
No Known Allergies Review of Systems: 
Pertinent review of systems discussed in HPI, and rest of organ systems personally reviewed and they are negative. Objective:  
Vital signs reviewed: 
 
 
Visit Vitals BP (!) 149/70 (BP 1 Location: Right upper arm, BP Patient Position: At rest) Pulse 76 Temp 98.3 °F (36.8 °C) Resp 18 Ht 6' (1.829 m) Wt 250 lb (113.4 kg) SpO2 92% BMI 33.91 kg/m² Physical Exam:  
General appearance:  
Patient is awake and alert, not in particular distress. Head and neck atraumatic normocephalic. ENT shows normal oral mucosa, no jaundice no hoarse voice. Eyes: Pupil equal gaze appropriate. Cardiac system regular rate rhythm. Pulmonary: No audible wheeze. Chest wall: Chest wall excursion normal with respiration cycle, there is no deformity or chest trauma. Abdomen: Soft not tender or distended, bowel sounds active. There is no obvious palpable mass, or hernia. Neurologic: Nonfocal. 
Cranial nerves intact, no new focal findings. Musculoskeletal system: Motor function normal limits, motor function 5 out of 5, range of motion normal in all 4 extremity Skin: Warm and moist. 
Hematologic system: No obvious bruising. Psychosocial: Appropriate and cooperative. Vascular examination: Lower and upper extremities warm to touch, no signs of ischemia or cyanosis. Data Review: Labs are reviewed. Discussed Recent Results (from the past 24 hour(s)) Mehdat Pompa Collection Time: 10/25/21  5:02 AM  
Result Value Ref Range Vancomycin,trough 17.5 (H) 5.0 - 10.0 ug/mL Reported dose date 0  Reported dose: 0 Units RENAL FUNCTION PANEL Collection Time: 10/25/21  5:02 AM  
Result Value Ref Range Sodium 145 136 - 145 mmol/L Potassium 4.1 3.5 - 5.1 mmol/L Chloride 111 (H) 97 - 108 mmol/L  
 CO2 29 21 - 32 mmol/L Anion gap 5 5 - 15 mmol/L Glucose 105 (H) 65 - 100 mg/dL BUN 88 (H) 6 - 20 mg/dL Creatinine 3.52 (H) 0.70 - 1.30 mg/dL BUN/Creatinine ratio 25 (H) 12 - 20 GFR est AA 20 (L) >60 ml/min/1.73m2 GFR est non-AA 17 (L) >60 ml/min/1.73m2 Calcium 8.6 8.5 - 10.1 mg/dL Phosphorus 3.3 2.6 - 4.7 mg/dL Albumin 2.3 (L) 3.5 - 5.0 g/dL CBC W/O DIFF Collection Time: 10/25/21  5:02 AM  
Result Value Ref Range WBC 11.6 (H) 4.1 - 11.1 K/uL  
 RBC 3.41 (L) 4.10 - 5.70 M/uL HGB 9.5 (L) 12.1 - 17.0 g/dL HCT 32.5 (L) 36.6 - 50.3 % MCV 95.3 80.0 - 99.0 FL  
 MCH 27.9 26.0 - 34.0 PG  
 MCHC 29.2 (L) 30.0 - 36.5 g/dL  
 RDW 14.9 (H) 11.5 - 14.5 % PLATELET 234 202 - 663 K/uL MPV 11.2 8.9 - 12.9 FL  
 NRBC 0.0 0.0  WBC ABSOLUTE NRBC 0.00 0.00 - 0.01 K/uL Imagings reviewed: discussed as below. No name on file. Assessment:  
 
Active Problems: 
  Multiple open wounds of foot (10/21/2021) Bilateral leg edema (10/22/2021) Acute kidney injury ([address] (10/23/2021) Plan:  
 
I will obtain venous duplex ultrasound and also arterial physiology testing both legs. Most likely these wounds are related to chronic swelling from a chronic venous hypertension. Continue local wound care with Aquacel Ag on both pedal area. Also recommend a whirlpool therapy of both the foot to clean the all the debrides and the dry scabs. Patient will need eventually a multilayer compression wraps or custom made compression stocking. I will continue monitor his progress. I will make further recommendation after vascular examination is reviewed.

## 2021-10-25 NOTE — PROGRESS NOTES
Infectious Diseases Progress Note  Jacob High MD  [phone]   Date:10/25/2021       Room:[facility]  Patient Name:Lewis Lizarraga     YOB: 1940     Age:81 y.o. 81M with history of smoking, hypertension, dyslipidemia, repaired AAA, obesity. Chronic venous stasis of the lower extremities with recurrent inflammation.     2018 I saw him at [facility] for complications of venous stasis dermatitis. At that hospital visit right leg wound cultures revealed MSSA and PSEUDOMONAS, managed with a two weeks course of levofloxacin and doxycycline at that time.     10/21/21 presents to hospital with a complaint of bilateral foot wound checks and lower extremity edema. Associated with bilateral lower extremity pain. EMS found him hypoxic and managed with supplemental oxygen. During the ED course chest x-ray showing pulmonary vascular congestion without overt pulmonary edema as well as left basilar airspace disease most likely atelectasis. Started on IV diuresis. Admitted to hospital and podiatry and wound care have been asked to see him for his condition.     Wound cultures from the right and left foot suggestive of multiple organisms and I have been asked to see him in consultation. Subjective    Subjective:  Symptoms:  Stable. No shortness of breath or chest pain. Review of Systems   Constitutional: Negative for fever. Respiratory: Negative for shortness of breath. Cardiovascular: Negative for chest pain. All other systems reviewed and are negative. Objective         Vitals Last 24 Hours:  TEMPERATURE:  Temp  Av.8 °F (36.6 °C)  Min: 97.4 °F (36.3 °C)  Max: 98.3 °F (36.8 °C)  RESPIRATIONS RANGE: Resp  Av.2  Min: 18  Max: 20  PULSE OXIMETRY RANGE: SpO2  Av.6 %  Min: 91 %  Max: 93 %  PULSE RANGE: Pulse  Av.9  Min: 64  Max: 139  BLOOD PRESSURE RANGE: Systolic (71EWB), JUT:448 , Min:102 , GNQ:205   ; Diastolic (93BUX), YIQ:66, Min:60, Max:75    I/O (24Hr):     Intake/Output Summary (Last 24 hours) at 10/25/2021 1237  Last data filed at 10/25/2021 0421  Gross per 24 hour   Intake    Output 400 ml   Net -400 ml     Objective:  General Appearance:  Comfortable. Vital signs: (most recent): Blood pressure (!) 140/60, pulse 71, temperature 97.4 °F (36.3 °C), resp. rate 20, height 6' (1.829 m), weight 113.4 kg (250 lb), SpO2 91 %. Vital signs are normal.  No fever. Constitutional: No acute distress  Skin: Extremities and face reveal no rashes, lower feet wrapped in a dressing which are clean, dry, intact, chronic lymphedema noted in legs bilaterally  HEENT: Sclerae anicteric. Extra-occular muscles are intact. No oral ulcers. The neck is supple and no masses. Cardiovascular: RRR  Respiratory:  Clear breath sounds bilaterally with no wheezes, rales, or rhonchi. GI: Abdomen nondistended, soft, and nontender. Normal active bowel sounds. Musculoskeletal: No pitting edema of the lower legs. Able to move all ext  Neurological:  Patient is alert and oriented. Cranial nerves II-XII grossly intact  Psychiatric: Mood appears appropriate     Labs/Imaging/Diagnostics    Labs:  CBC:  Recent Labs     10/25/21  0502 10/24/21  0601 10/23/21  0513   WBC 11.6* 11.5* 11.6*   RBC 3.41* 3.58* 3.66*   HGB 9.5* 10.0* 10.4*   HCT 32.5* 34.1* 35.2*   MCV 95.3 95.3 96.2   RDW 14.9* 15.1* 15.1*    220 226     CHEMISTRIES:  Recent Labs     10/25/21  0502 10/24/21  0601 10/23/21  0513    144 146*   K 4.1 4.0 4.1   * 111* 112*   CO2 29 27 27   BUN 88* 71* 59*   CA 8.6 8.6 8.3*   PHOS 3.3  --   --    PT/INR:No results for input(s): INR, INREXT, INREXT in the last 72 hours. No lab exists for component: PROTIME  APTT:No results for input(s): APTT in the last 72 hours. LIVER PROFILE:No results for input(s): AST, ALT in the last 72 hours.     No lab exists for component: PHUONG Pacheco  Lab Results   Component Value Date/Time    ALT (SGPT) 24 10/21/2021 09:20 AM    AST (SGOT) 37 10/21/2021 09:20 AM    Alk. phosphatase 119 (H) 10/21/2021 09:20 AM    Bilirubin, total 0.6 10/21/2021 09:20 AM       Imaging Last 24 Hours:  US RETROPERITONEUM COMP    Result Date: 10/25/2021  Study: Ultrasound of the retroperitoneum and kidneys Clinical indication: Acute kidney injury Comparison: None Technique: Multiple grayscale and color Doppler images of the retroperitoneum were obtained by the ultrasound technologist. Findings: Right kidney: The right kidney measures 14.8 6.0 x 6.4 cm and demonstrates increased parenchymal echogenicity. Several cysts, measuring up to 11.3 x 7.1 x 6.6 cm. No hydronephrosis. Left kidney: The left kidney measures 17.1 x 7.3 x 7.7 cm and demonstrates increased parenchymal echogenicity. Numerous cysts, largest measuring up to 17.0 cm. No hydronephrosis. Normal vascular flow. Bladder: Mildly distended, estimated volume of 220 mL. Postvoid residual of 103 mL. Urinary bladder diverticulum noted. Visualized aorta and IVC: Grossly unremarkable. 1.  Medical renal disease. No hydronephrosis. 2.  Numerous large bilateral renal cysts. 3.  Urinary bladder postvoid residual of 103 mL. Assessment//Plan   Active Problems:    Multiple open wounds of foot (10/21/2021)      Bilateral leg edema (10/22/2021)      Acute kidney injury ([address] (10/23/2021)      Assessment & Plan  81M with history of smoking, hypertension, dyslipidemia, repaired AAA, obesity. Chronic venous stasis of the lower extremities with recurrent inflammation.     July 2018 I saw him at [facility] for complications of venous stasis dermatitis. At that hospital visit right leg wound cultures revealed MSSA and PSEUDOMONAS, managed with a two weeks course of levofloxacin and doxycycline at that time.     10/21/21 presents to hospital with a complaint of bilateral foot wound checks and lower extremity edema. Associated with bilateral lower extremity pain. EMS found him hypoxic and managed with supplemental oxygen.  During the ED course chest x-ray showing pulmonary vascular congestion without overt pulmonary edema as well as left basilar airspace disease most likely atelectasis. Started on IV diuresis.  Admitted to hospital and podiatry and wound care have been asked to see him for his condition.     Wound cultures from the right and left foot suggestive of multiple organisms and I have been asked to see him in consultation.     MICROBIOLOGY     7/28/18            R leg    MSSA                                      Pseudomonas     10/21/21          Blood   Negative so far     10/21/21          R foot   Heavy Gram Negative Rods MULTIPLE COLONY TYPES                                      checking for possible BETA STREPTOCOCCUS     10/21/21          L foot   Heavy Streptococci, beta hemolyticgroup C     Myroides species     Proteus vulgaris     Morganella morganii ssp morganii     ASSESSMENT AND RECOMMENDATIONS     1) Chronic venous stasis of the bilateral lower extremities further complicated by complex soft tissue infection.      Vancomycin de-escalated              Zosyn iv per pharmacy dosing through 11/1/21              Wound care inpatient and outpatient        Electronically signed by Kelly Maria MD on 10/25/2021 at 12:54 PM

## 2021-10-25 NOTE — PROGRESS NOTES
Day #5 of Vancomycin    Indication for Antimicrobials: SSTI     Consult placed by: JENELLE Barkley    Significant Cultures:   10/21 would; left foot: 4+ Gram Positive Cocci, 3+ Gram Negative Rods, Heavy Streptococci, beta hemolyticgroup C, Heavy Myroides, Heavy Proteus vulgaris, Heavy Morganella morganii ssp morganii    Labs:  Recent Labs     10/25/21  0502 10/24/21  0601 10/23/21  0513   CREA 3.52* 3.89* 3.60*   BUN 88* 71* 59*   WBC 11.6* 11.5* 11.6*     Temp (24hrs), Av.9 °F (36.6 °C), Min:97.6 °F (36.4 °C), Max:98.3 °F (36.8 °C)      Is the Patient on Dialysis? No    Creatinine Clearance (mL/min):   CrCl (Actual Body Weight): 26.8  CrCl (Adjusted Body Weight): 21.8  CrCl (Ideal Body Weight): 18.4    Goal level: AUC/DOMITILA 400-500     Date Dose & Interval Associated AUC Measured Level    10/22 @ 0615 750 x 1  5.8   10/23 @ 1050 750 x 1  9.9   10/24 @ 0601 1000 x 1  16   10/25 @ 0502 750 x 1  17.5       Impression/Plan:   MAURILIO still present  Will order a 750 mg x 1 dose  Check random level tomorrow AM     Pharmacy will follow daily and adjust medications as appropriate for renal function and/or serum levels.     Thank you,  [facility]

## 2021-10-25 NOTE — CONSULTS
[facility] CONSULT NOTE I will defer to vascular surgery at this time as to not duplicate services Thank you and please re-consult if needed Daron De Leon DPM, [facility], [address] [place] [address] [address] and Foot Surgery [address] [place] [address], [address] [facility] O:  [id number] F:  [phone] C:  [phone]

## 2021-10-25 NOTE — CONSULTS
Consult    Patient: Scotty Lea MRN: [id number]  SSN: [id number]    YOB: 1940  Age: [de-identified] y.o. Sex: male       Subjective:      Date of  Admission: 10/21/2021     Admission type: Emergency    Scotty Lea is a [de-identified] y.o. male with a history of hypertension, hyperlipidemia. He was admitted due to leg swelling, wounds from chronic venous ulcers. He was found to be in atrial flutter hence we are asked to see him. He spontaneously converted to normal sinus rhythm. I have no available EKGs suggesting atrial flutter, however per recorded vitals his HR was in the 130s on presentation. He reports having had atrial fibrillation 'many years ago' but has not been following up with a cardiologist. He denies being on anticoagulation in the past. He is not very mobile at baseline however there is no history of recent worsening dyspnea on exertion, orthopnea, PND or chest pain.      Primary Care Provider: Gustabo Mohs, MD  Past Medical History:   Diagnosis Date    High cholesterol     Hx of long term use of blood thinners     aspirin    Hypertension     Lymphedema     Vascular disease       Past Surgical History:   Procedure Laterality Date    HX OTHER SURGICAL      repair of tendon achilles    HX OTHER SURGICAL  2012    aortic anrysm 6/> cm diamater     Family History   Problem Relation Age of Onset    Hypertension Mother       Social History     Tobacco Use    Smoking status: Former Smoker     Packs/day: 2.00     Years: 50.00     Pack years: 100.00     Quit date:      Years since quittin.8    Smokeless tobacco: Never Used   Substance Use Topics    Alcohol use: Not on file      Current Facility-Administered Medications   Medication Dose Route Frequency    furosemide (LASIX) injection 40 mg  40 mg IntraVENous BID    metoprolol succinate (TOPROL-XL) XL tablet 50 mg  50 mg Oral DAILY    dilTIAZem IR (CARDIZEM) tablet 30 mg  30 mg Oral TIDAC    ammonium lactate (AMLACTIN) 12 % cream Topical DAILY    zinc oxide-white petrolatum 17-57 % topical paste   Topical TID    heparin (porcine) injection 5,000 Units  5,000 Units SubCUTAneous Q8H    sodium chloride (NS) flush 5-40 mL  5-40 mL IntraVENous Q8H    sodium chloride (NS) flush 5-40 mL  5-40 mL IntraVENous PRN    acetaminophen (TYLENOL) tablet 650 mg  650 mg Oral Q6H PRN    Or    acetaminophen (TYLENOL) suppository 650 mg  650 mg Rectal Q6H PRN    polyethylene glycol (MIRALAX) packet 17 g  17 g Oral DAILY PRN    ondansetron (ZOFRAN ODT) tablet 4 mg  4 mg Oral Q8H PRN    Or    ondansetron (ZOFRAN) injection 4 mg  4 mg IntraVENous Q6H PRN    aspirin delayed-release tablet 81 mg  81 mg Oral DAILY    atorvastatin (LIPITOR) tablet 40 mg  40 mg Oral DAILY    hydrALAZINE (APRESOLINE) 20 mg/mL injection 20 mg  20 mg IntraVENous Q6H PRN    budesonide-formoteroL (SYMBICORT) 160-4.5 mcg/actuation HFA inhaler 2 Puff  2 Puff Inhalation BID RT    influenza vaccine 2021-22 (6 mos+)(PF) (FLUARIX/FLULAVAL/FLUZONE QUAD) injection 0.5 mL  1 Each IntraMUSCular PRIOR TO DISCHARGE    piperacillin-tazobactam (ZOSYN) 3.375 g in 0.9% sodium chloride (MBP/ADV) 100 mL MBP  3.375 g IntraVENous Q8H        No Known Allergies     Review of Systems:  A comprehensive review of systems was negative except for that written in the History of Present Illness. Subjective:     Visit Vitals  BP (!) 140/60 (BP 1 Location: Right upper arm, BP Patient Position: At rest;Lying)   Pulse 71   Temp 97.4 °F (36.3 °C)   Resp 20   Ht 6' (1.829 m)   Wt 113.4 kg (250 lb)   SpO2 91%   BMI 33.91 kg/m²        Physical Exam:  Visit Vitals  BP (!) 140/60 (BP 1 Location: Right upper arm, BP Patient Position: At rest;Lying)   Pulse 71 Comment: Simultaneous filing. User may not have seen previous data.    Temp 97.4 °F (36.3 °C)   Resp 20   Ht 6' (1.829 m)   Wt 113.4 kg (250 lb)   SpO2 91%   BMI 33.91 kg/m²     General Appearance:  Well developed, well nourished,alert and oriented x 3, and individual in no acute distress. Ears/Nose/Mouth/Throat:   Hearing grossly normal.         Neck: Supple. Chest:   Reduced sounds b/l   Cardiovascular:  Regular rate and rhythm, S1, S2 normal, no murmur. Abdomen:   Soft, non-tender, bowel sounds are active. Extremities: Chronic edema, venous stasis skin changes   Skin: Warm and dry.                Cardiographics:  Telemetry: normal sinus rhythm  ECG: normal sinus rhythm, RBBB    Data Reviewed:   BMP:   Lab Results   Component Value Date/Time     10/25/2021 05:02 AM    K 4.1 10/25/2021 05:02 AM     (H) 10/25/2021 05:02 AM    CO2 29 10/25/2021 05:02 AM    AGAP 5 10/25/2021 05:02 AM     (H) 10/25/2021 05:02 AM    BUN 88 (H) 10/25/2021 05:02 AM    CREA 3.52 (H) 10/25/2021 05:02 AM    GFRAA 20 (L) 10/25/2021 05:02 AM    GFRNA 17 (L) 10/25/2021 05:02 AM     CMP:   Lab Results   Component Value Date/Time     10/25/2021 05:02 AM    K 4.1 10/25/2021 05:02 AM     (H) 10/25/2021 05:02 AM    CO2 29 10/25/2021 05:02 AM    AGAP 5 10/25/2021 05:02 AM     (H) 10/25/2021 05:02 AM    BUN 88 (H) 10/25/2021 05:02 AM    CREA 3.52 (H) 10/25/2021 05:02 AM    GFRAA 20 (L) 10/25/2021 05:02 AM    GFRNA 17 (L) 10/25/2021 05:02 AM    CA 8.6 10/25/2021 05:02 AM    PHOS 3.3 10/25/2021 05:02 AM    ALB 2.3 (L) 10/25/2021 05:02 AM     CBC:   Lab Results   Component Value Date/Time    WBC 11.6 (H) 10/25/2021 05:02 AM    HGB 9.5 (L) 10/25/2021 05:02 AM    HCT 32.5 (L) 10/25/2021 05:02 AM     10/25/2021 05:02 AM     All Cardiac Markers in the last 24 hours: No results found for: CPK, CK, CKMMB, CKMB, RCK3, CKMBT, CKNDX, CKND1, ASHOK, TROPT, TROIQ, TERESA, TROPT, TNIPOC, BNP, BNPP  Recent Glucose Results:   Lab Results   Component Value Date/Time     (H) 10/25/2021 05:02 AM     ABG: No results found for: PH, PHI, PCO2, PCO2I, PO2, PO2I, HCO3, HCO3I, FIO2, FIO2I  COAGS: No results found for: APTT, PTP, INR, INREXT, INREXT     Assessment: Paroxysmal Atrial Flutter? Acute CHF unknown type  Acute on CKD  Chronic Venous Ulcers  Hypertension  Hyperlipidemia      Plan:   -Difficult to assess volume status however radiologic studies suggest volume overload. Agree with IV diuresis for now. Follow I&O, daily weight. Replete electrolytes as needed. Await transthoracic echocardiogram to evaluate LV and RV function.  -Per patient he has had atrial fibrillation in the past but he not sure about having been on anticoagulation in the past. His [id number] score is 4, HASBLED score is 3; he has a reoughly comparable stroke as well as bleeding risk. Nonetheless, I think he will benefit from long term anticoagulation. For now we will await his echocardiogram to assess cause of heart failure, and address anticoagulation prior to discharge once a care plan regarding his lower extremity wounds is also definitive. Patient agrees with the plan. -Rest as per echo findings  -We will follow    Thank you for allowing us to participate in the care of your patient.

## 2021-10-25 NOTE — PROGRESS NOTES
Renal Progress Note    Patient: Sai Singh MRN: [id number]  SSN: [id number]    YOB: 1940  Age: [de-identified] y.o. Sex: male      Admit Date: 10/21/2021    LOS: 3 days     Subjective:   Patient seen at bedside. Alert and awake, no acute distress.    Claims frequent urination with diuretics  No SOB,   Creat down to 3.5 today    Current Facility-Administered Medications   Medication Dose Route Frequency    furosemide (LASIX) injection 40 mg  40 mg IntraVENous BID    metoprolol succinate (TOPROL-XL) XL tablet 50 mg  50 mg Oral DAILY    dilTIAZem IR (CARDIZEM) tablet 30 mg  30 mg Oral TIDAC    ammonium lactate (AMLACTIN) 12 % cream   Topical DAILY    zinc oxide-white petrolatum 17-57 % topical paste   Topical TID    heparin (porcine) injection 5,000 Units  5,000 Units SubCUTAneous Q8H    sodium chloride (NS) flush 5-40 mL  5-40 mL IntraVENous Q8H    sodium chloride (NS) flush 5-40 mL  5-40 mL IntraVENous PRN    acetaminophen (TYLENOL) tablet 650 mg  650 mg Oral Q6H PRN    Or    acetaminophen (TYLENOL) suppository 650 mg  650 mg Rectal Q6H PRN    polyethylene glycol (MIRALAX) packet 17 g  17 g Oral DAILY PRN    ondansetron (ZOFRAN ODT) tablet 4 mg  4 mg Oral Q8H PRN    Or    ondansetron (ZOFRAN) injection 4 mg  4 mg IntraVENous Q6H PRN    aspirin delayed-release tablet 81 mg  81 mg Oral DAILY    atorvastatin (LIPITOR) tablet 40 mg  40 mg Oral DAILY    hydrALAZINE (APRESOLINE) 20 mg/mL injection 20 mg  20 mg IntraVENous Q6H PRN    budesonide-formoteroL (SYMBICORT) 160-4.5 mcg/actuation HFA inhaler 2 Puff  2 Puff Inhalation BID RT    influenza vaccine 2021-22 (6 mos+)(PF) (FLUARIX/FLULAVAL/FLUZONE QUAD) injection 0.5 mL  1 Each IntraMUSCular PRIOR TO DISCHARGE    piperacillin-tazobactam (ZOSYN) 3.375 g in 0.9% sodium chloride (MBP/ADV) 100 mL MBP  3.375 g IntraVENous Q8H        Vitals:    10/25/21 0400 10/25/21 0800 10/25/21 0811 10/25/21 1152   BP:   (!) 157/75 (!) 140/60   Pulse: 76 70 64 71 Resp:   18 20   Temp:   97.6 °F (36.4 °C) 97.4 °F (36.3 °C)   SpO2:   93% 91%   Weight:       Height:         Objective:   General: alert awake well-oriented, no acute distress. HEENT: EOMI, no Icterus, no Pallor,  mucosa moist.  Neck: Neck is supple, No JVD  Lungs: breathsounds normal,  no rhonchi, no rales,   CVS: heart sounds normal,  no murmurs, no rubs. GI: soft, nontender, normal BS. Extremeties: no cyanosis, 1-2+ de edema,   Neuro: Alert, awake, oriented x3, No new focal deficits, moving all extremeties well. Skin: normal skin turgor, no skin rashes. Intake and Output:  Current Shift: No intake/output data recorded. Last three shifts: 10/23 1901 - 10/25 0700  In: -   Out: 1250 [Urine:1250]      Lab/Data Review:  Recent Labs     10/25/21  0502 10/24/21  0601 10/23/21  0513   WBC 11.6* 11.5* 11.6*   HGB 9.5* 10.0* 10.4*   HCT 32.5* 34.1* 35.2*    220 226     Recent Labs     10/25/21  0502 10/24/21  0601 10/23/21  0513    144 146*   K 4.1 4.0 4.1   * 111* 112*   CO2 29 27 27   * 111* 115*   BUN 88* 71* 59*   CREA 3.52* 3.89* 3.60*   CA 8.6 8.6 8.3*   PHOS 3.3  --   --    ALB 2.3*  --   --      No results for input(s): PH, PCO2, PO2, HCO3, FIO2 in the last 72 hours.   Recent Results (from the past 24 hour(s))   VANCOMYCIN, TROUGH    Collection Time: 10/25/21  5:02 AM   Result Value Ref Range    Vancomycin,trough 17.5 (H) 5.0 - 10.0 ug/mL    Reported dose date 0      Reported dose: 0 Units   RENAL FUNCTION PANEL    Collection Time: 10/25/21  5:02 AM   Result Value Ref Range    Sodium 145 136 - 145 mmol/L    Potassium 4.1 3.5 - 5.1 mmol/L    Chloride 111 (H) 97 - 108 mmol/L    CO2 29 21 - 32 mmol/L    Anion gap 5 5 - 15 mmol/L    Glucose 105 (H) 65 - 100 mg/dL    BUN 88 (H) 6 - 20 mg/dL    Creatinine 3.52 (H) 0.70 - 1.30 mg/dL    BUN/Creatinine ratio 25 (H) 12 - 20      GFR est AA 20 (L) >60 ml/min/1.73m2    GFR est non-AA 17 (L) >60 ml/min/1.73m2    Calcium 8.6 8.5 - 10.1 mg/dL    Phosphorus 3.3 2.6 - 4.7 mg/dL    Albumin 2.3 (L) 3.5 - 5.0 g/dL   CBC W/O DIFF    Collection Time: 10/25/21  5:02 AM   Result Value Ref Range    WBC 11.6 (H) 4.1 - 11.1 K/uL    RBC 3.41 (L) 4.10 - 5.70 M/uL    HGB 9.5 (L) 12.1 - 17.0 g/dL    HCT 32.5 (L) 36.6 - 50.3 %    MCV 95.3 80.0 - 99.0 FL    MCH 27.9 26.0 - 34.0 PG    MCHC 29.2 (L) 30.0 - 36.5 g/dL    RDW 14.9 (H) 11.5 - 14.5 %    PLATELET 517 938 - 563 K/uL    MPV 11.2 8.9 - 12.9 FL    NRBC 0.0 0.0  WBC    ABSOLUTE NRBC 0.00 0.00 - 0.01 K/uL   CHLORIDE, URINE RANDOM    Collection Time: 10/25/21  5:30 AM   Result Value Ref Range    Chloride,urine random 117 mmol/L   SODIUM, UR, RANDOM    Collection Time: 10/25/21  5:30 AM   Result Value Ref Range    Sodium,urine random 113 mmol/L   POTASSIUM, UR, RANDOM    Collection Time: 10/25/21  5:30 AM   Result Value Ref Range    Potassium urine, random 13 mmol/L   CREATININE, UR, RANDOM    Collection Time: 10/25/21  5:30 AM   Result Value Ref Range    Creatinine, urine 29.00 mg/dL   PROTEIN/CREATININE RATIO, URINE    Collection Time: 10/25/21  5:30 AM   Result Value Ref Range    Protein, urine random 145 (H) 0.0 - 11.9 mg/dL    Creatinine, urine 28.00 mg/dL    Protein/Creat.  urine Ratio 5.2     URINALYSIS W/MICROSCOPIC    Collection Time: 10/25/21  5:30 AM   Result Value Ref Range    Color Yellow/Straw      Appearance Clear Clear      Specific gravity 1.011 1.003 - 1.030      pH (UA) 6.0 5.0 - 8.0      Protein 100 (A) Negative mg/dL    Glucose Negative Negative mg/dL    Ketone Negative Negative mg/dL    Bilirubin Negative Negative      Blood Small (A) Negative      Urobilinogen 0.1 0.1 - 1.0 EU/dL    Nitrites Negative Negative      Leukocyte Esterase Negative Negative      WBC 0-4 0 - 4 /hpf    RBC 0-5 0 - 5 /hpf    Bacteria Negative Negative /hpf    Mucus Trace /lpf        Assessment and Plan:     1 acute kidney injury on underlying chronic kidney disease stage unknown.    -Etiology is prerenal azotemia from cardiorenal.    -On admission BUN/creatinine 47/2.8 and has gone up to 71/3.8.  ,   creat down to 3.52 today  Continue diuretics for fluid overload/edema  We will continue to monitor renal functions    2. Chronic kidney disease, probably has a stage IV chronic kidney disease as baseline  Renal ultrasound showed larger kidneys with multiple bilateral cysts suggestive of polycystic kidney disease. Check a urine protein creatinine ratio to assess for proteinuria  Check an intact PTH and vitamin D levels to assess for metabolic bone disease  We will continue to monitor renal functions to assess the baseline    2. Hypotension. Improved hemodynamic status now  Amlodipine was discontinued  Continue to monitor blood pressures     3. CHF. -He is admitted with decompensated CHF.    -2D echocardiogram has been ordered.    -On admission he was with hypoxic respiratory failure and lower extremity edema.    -Improved clinically with the diuresis, continue current Lasix and continue to monitor inputs and outputs and renal functions     4.  Bilateral lower extremity wounds.    -On IV antibiotics as per ID recommendations.   Vascular surgery evaluating for vascular insufficiency      Signed By: Kenna Steiner MD     October 25, 2021

## 2021-10-26 NOTE — PROGRESS NOTES
Glendy Dao called to check on patient. I spoke with patient to confirm that it was okay to give this woman his patient code and share information with her. He confirmed that he approves this. Code was given to Glendy Dao.

## 2021-10-26 NOTE — PROGRESS NOTES
PHYSICAL THERAPY TREATMENT  Patient: Sanchez Castillo (40 y.o. male)  Date: 10/26/2021  Diagnosis: Multiple open wounds of foot [S91.309A]  Bilateral leg edema [R60.0] <principal problem not specified>       Precautions: Fall  Chart, physical therapy assessment, plan of care and goals were reviewed. ASSESSMENT  Patient continues with skilled PT services and is progressing towards goals. Pt semi supine in bed upon arrival and agreeable to session. Improvements noted in mobility, required SBA for sup>sit, scooting, and rolling. Requires min A x 2 for STS form EOB. Patient able to ambulate to bathroom with use of RW and CGA x 2 for safety, as patient fatigued required min A x 2. Pt had no LOB during gait, slow and shuffled with narrow MARTA. Patient completed toileting, see OT note for details. Patient required mod A x 2 for STS from toilet. Patient ambulated back to bed with CGA x 2 and RW and as pt fatigued required min A x 2. Patient returned to semi supine position with mod A x 2. Patient did report feeling light headed during ambulation and post ambulation pt reporting SOB. Patient left semi supine in bed with call bell in reach and needs met. Spoke to supervising PT and changing d/c recs from [address] to SNF. Current Level of Function Impacting Discharge (mobility/balance): assistance required for most mobility     Other factors to consider for discharge: PLOF, time since onset, severity of deficits          PLAN :  Patient continues to benefit from skilled intervention to address the above impairments. Continue treatment per established plan of care. to address goals.     Recommendation for discharge: (in order for the patient to meet his/her long term goals)  Solo Hudson    This discharge recommendation:  Has been made in collaboration with the attending provider and/or case management    IF patient discharges home will need the following DME: to be determined (TBD)       SUBJECTIVE:   Patient stated I feel light headed    OBJECTIVE DATA SUMMARY:   Critical Behavior:  Neurologic State: Alert  Orientation Level: Oriented X4  Cognition: Decreased attention/concentration, Follows commands  Safety/Judgement: Fall prevention, Good awareness of safety precautions    Functional Mobility Training:  Bed Mobility:  Rolling: Stand-by assistance  Supine to Sit: Stand-by assistance  Sit to Supine: Moderate assistance;Assist x2  Scooting: Stand-by assistance    Transfers:  Sit to Stand: Minimum assistance; Moderate assistance;Assist x2  Stand to Sit: Minimum assistance; Moderate assistance;Assist x2  Bed to Chair: Minimum assistance; Moderate assistance;Assist x2    Balance:  Sitting: Impaired; With support  Sitting - Static: Good (unsupported)  Sitting - Dynamic: Fair (occasional)  Standing: Impaired;Pull to stand; With support  Standing - Static: Constant support; Fair  Standing - Dynamic : Constant support; Fair    Ambulation/Gait Training:  Distance (ft): 20 Feet (ft)  Assistive Device: Gait belt;Walker, rolling  Ambulation - Level of Assistance: Contact guard assistance;Minimal assistance;Assist x2  Base of Support: Narrowed  Speed/Erica: Slow;Shuffled  Step Length: Left shortened;Right shortened    Pain Ratin/10    Activity Tolerance:   Poor, requires rest breaks, and observed SOB with activity  Please refer to the flowsheet for vital signs taken during this treatment. After treatment patient left in no apparent distress:   Supine in bed, Call bell within reach, and RN and Transport in room    COMMUNICATION/COLLABORATION:   The patients plan of care was discussed with: Occupational therapist and Registered nurse. OT/PT sessions occurred together for increased patient and clinician safety    Problem: Mobility Impaired (Adult and Pediatric)  Goal: *Acute Goals and Plan of Care (Insert Text)  Description: 1. Pt will be I with LE HEP in 7 days.   2. Pt will perform bed mobility with MOD IND  in 7 days.  3. Pt will perform transfers with MIN A  in 7 days. 4. Pt will be able to stand > 5 mins with LRAD safely with SBA in 7 days.    10/26/2021 1424 by Kendrick Carcamo  Outcome: Progressing Towards Goal            Nimco Yang PTA   Time Calculation: 24 mins

## 2021-10-26 NOTE — PROGRESS NOTES
Problem: Falls - Risk of  Goal: *Absence of Falls  Description: Document Elisha Rollins Fall Risk and appropriate interventions in the flowsheet. Outcome: Progressing Towards Goal  Note: Fall Risk Interventions:  Mobility Interventions: Communicate number of staff needed for ambulation/transfer, Bed/chair exit alarm, PT Consult for mobility concerns, Patient to call before getting OOB         Medication Interventions: Bed/chair exit alarm, Patient to call before getting OOB, Teach patient to arise slowly    Elimination Interventions: Call light in reach, Bed/chair exit alarm, Patient to call for help with toileting needs, Toileting schedule/hourly rounds, Toilet paper/wipes in reach, Urinal in reach              Problem: Patient Education: Go to Patient Education Activity  Goal: Patient/Family Education  Outcome: Progressing Towards Goal     Problem: Pressure Injury - Risk of  Goal: *Prevention of pressure injury  Description: Document Socrates Scale and appropriate interventions in the flowsheet. Outcome: Progressing Towards Goal  Note: Pressure Injury Interventions:  Sensory Interventions: Keep linens dry and wrinkle-free, Maintain/enhance activity level, Minimize linen layers    Moisture Interventions: Absorbent underpads, Minimize layers, Apply protective barrier, creams and emollients    Activity Interventions: Increase time out of bed, Pressure redistribution bed/mattress(bed type)    Mobility Interventions: Pressure redistribution bed/mattress (bed type), HOB 30 degrees or less, Turn and reposition approx.  every two hours(pillow and wedges)    Nutrition Interventions: Document food/fluid/supplement intake, Offer support with meals,snacks and hydration    Friction and Shear Interventions: Minimize layers, Lift sheet, HOB 30 degrees or less, Apply protective barrier, creams and emollients                Problem: Patient Education: Go to Patient Education Activity  Goal: Patient/Family Education  Outcome: Progressing Towards Goal     Problem: Patient Education: Go to Patient Education Activity  Goal: Patient/Family Education  Outcome: Progressing Towards Goal

## 2021-10-26 NOTE — PROGRESS NOTES
Patient accepted to go to [facility] today, room 300 and nurse can call report to [phone]. CM confirmed that they will continue IV Zosyn through 11/1. CM spoke with Afshin Singh NP. Patient had echo done earlier today that is waiting to be read. If negative, patient cleared to DC. Clay Knapp with [facility] stated that they can accept patient anytime before 9pm tonight if discharged today. CM notified patient of acceptance and possible discharge today. Patient cleared from CM standpoint if discharged this evening. Checklist signed on chart, pending final DC order.

## 2021-10-26 NOTE — PROGRESS NOTES
CM met with patient at bedside to discuss DC planning, therapy recommendations for SNF, need for outpatient IV ABX and wound care. Patient stated that he has been at [facility] before and agreed to go there. CM gave patient choice to decide on 2 more SNF's as back-up in case [facility] doesn't have availability. CM sent referral to [facility].

## 2021-10-26 NOTE — PROGRESS NOTES
Infectious Diseases Progress Note  Jacob Herrera MD  [phone]   Date:10/26/2021       Room:[facility]  Patient Name:Lewis Alves     YOB: 1940     Age:81 y.o. 81M with history of smoking, hypertension, dyslipidemia, repaired AAA, obesity. Chronic venous stasis of the lower extremities with recurrent inflammation.     2018 I saw him at [facility] for complications of venous stasis dermatitis. At that hospital visit right leg wound cultures revealed MSSA and PSEUDOMONAS, managed with a two weeks course of levofloxacin and doxycycline at that time.     10/21/21 presents to hospital with a complaint of bilateral foot wound checks and lower extremity edema. Associated with bilateral lower extremity pain. EMS found him hypoxic and managed with supplemental oxygen. During the ED course chest x-ray showing pulmonary vascular congestion without overt pulmonary edema as well as left basilar airspace disease most likely atelectasis. Started on IV diuresis. Admitted to hospital and podiatry and wound care have been asked to see him for his condition.     Wound cultures from the right and left foot suggestive of multiple organisms and I have been asked to see him in consultation. Subjective    Subjective:  Symptoms:  Stable. No shortness of breath or chest pain. Review of Systems   Constitutional: Negative for fever. Respiratory: Negative for shortness of breath. Cardiovascular: Negative for chest pain. All other systems reviewed and are negative. Objective         Vitals Last 24 Hours:  TEMPERATURE:  Temp  Av.6 °F (36.4 °C)  Min: 97.2 °F (36.2 °C)  Max: 97.9 °F (36.6 °C)  RESPIRATIONS RANGE: Resp  Av  Min: 18  Max: 18  PULSE OXIMETRY RANGE: SpO2  Av.8 %  Min: 92 %  Max: 98 %  PULSE RANGE: Pulse  Av  Min: 56  Max: 70  BLOOD PRESSURE RANGE: Systolic (19RVI), ZTR:225 , Min:137 , DRQ:226   ; Diastolic (47MQP), CXU:24, Min:54, Max:65    I/O (24Hr):     Intake/Output Summary (Last 24 hours) at 10/26/2021 1327  Last data filed at 10/26/2021 0735  Gross per 24 hour   Intake 300 ml   Output 1165 ml   Net -865 ml     Objective:  General Appearance:  Comfortable. Vital signs: (most recent): Blood pressure (!) 144/54, pulse 70, temperature 97.6 °F (36.4 °C), resp. rate 18, height 6' (1.829 m), weight 113.4 kg (250 lb), SpO2 98 %. Vital signs are normal.  No fever. Constitutional: No acute distress  Skin: Extremities and face reveal no rashes, lower feet wrapped in a dressing which are clean, dry, intact, chronic lymphedema noted in legs bilaterally  HEENT: Sclerae anicteric. Extra-occular muscles are intact. No oral ulcers. The neck is supple and no masses. Cardiovascular: RRR  Respiratory:  Clear breath sounds bilaterally with no wheezes, rales, or rhonchi. GI: Abdomen nondistended, soft, and nontender. Normal active bowel sounds. Musculoskeletal: No pitting edema of the lower legs. Able to move all ext  Neurological:  Patient is alert and oriented. Cranial nerves II-XII grossly intact  Psychiatric: Mood appears appropriate     Labs/Imaging/Diagnostics    Labs:  CBC:  Recent Labs     10/26/21  0745 10/25/21  0502 10/24/21  0601   WBC 14.3* 11.6* 11.5*   RBC 3.40* 3.41* 3.58*   HGB 9.5* 9.5* 10.0*   HCT 32.1* 32.5* 34.1*   MCV 94.4 95.3 95.3   RDW 15.0* 14.9* 15.1*    233 220     CHEMISTRIES:  Recent Labs     10/26/21  0745 10/25/21  0502 10/24/21  0601    145 144   K 4.0 4.1 4.0   * 111* 111*   CO2 30 29 27   * 88* 71*   CA 8.8 8.6 8.6   PHOS 3.8 3.3  --    PT/INR:No results for input(s): INR, INREXT, INREXT in the last 72 hours. No lab exists for component: PROTIME  APTT:No results for input(s): APTT in the last 72 hours. LIVER PROFILE:No results for input(s): AST, ALT in the last 72 hours.     No lab exists for component: PHUONG Odonnell  Lab Results   Component Value Date/Time    ALT (SGPT) 24 10/21/2021 09:20 AM    AST (SGOT) 37 10/21/2021 09:20 AM    Alk. phosphatase 119 (H) 10/21/2021 09:20 AM    Bilirubin, total 0.6 10/21/2021 09:20 AM       Imaging Last 24 Hours:  DUPLEX LOWER EXT VENOUS BILAT    Result Date: 10/26/2021  · No evidence of deep vein thrombosis in the right lower extremity. · No evidence of deep vein thrombosis in the left lower extremity. This is vascular report on Janel Barone, patient's YOB: 1940. Date of examination: October 25, 2021. Examination: Duplex lower extremity venous bilateral. Technician: Mr. Asiya Bacon. Clinical history: Patient is 71-year-old man with a history of hypertension, quit smoking 2015. Indication: Bilateral leg pain and swelling, DVT suspected. Technique: Bilateral leg venous structures examined using venous duplex ultrasound with 2D grayscale, color-flow and spectral Doppler analysis. Findings: Right side: common femoral vein and saphenofemoral junction is compressible, there is no filling defect. common femoral vein augments. Proximal femoral vein is compressible, there is no filling defect. Mid femoral vein is compressible, there is no filling defects and augments. Distal femoral vein is compressible, there is no filling defect. Greater saphenous vein has been harvested. Distal popliteal vein is compressible, there is no filling defect Proximal popliteal vein is compressible, there is no filling defects and augments. Proximal gastrocnemius vein is compressible, there is no filling defects. Proximal small saphenous vein is compressible, there is no filling defects. Distal, mid, proximal posterior tibial vein and peroneal vein is compressible, there is no filling defect. Left side: common femoral vein and saphenofemoral junction is compressible, there is no filling defect. common femoral vein augments. Proximal femoral vein is compressible, there is no filling defect. Mid femoral vein is compressible, there is no filling defects and augments.  Distal femoral vein is compressible, there is no filling defect. Proximal, mid, distal greater saphenous vein is compressible, there is no filling defect Distal popliteal vein is compressible, there is no filling defect Proximal popliteal vein is compressible, there is no filling defects and augments. Proximal gastrocnemius vein is compressible, there is no filling defects. Proximal small saphenous vein is compressible, there is no filling defects. Distal, mid, proximal posterior tibial vein and peroneal vein is compressible, there is no filling defect. Impression: 1. Bilateral leg deep vein system and superficial vein system do not show acute venous thrombosis. 2.  Bilateral leg deep vein system shows spontaneous, phasic, competent venous flow with augmentation. Assessment//Plan   Active Problems:    Multiple open wounds of foot (10/21/2021)      Bilateral leg edema (10/22/2021)      Acute kidney injury ([facility] [address] (10/23/2021)      Assessment & Plan  81M with history of smoking, hypertension, dyslipidemia, repaired AAA, obesity. Chronic venous stasis of the lower extremities with recurrent inflammation.     July 2018 I saw him at [facility] for complications of venous stasis dermatitis. At that hospital visit right leg wound cultures revealed MSSA and PSEUDOMONAS, managed with a two weeks course of levofloxacin and doxycycline at that time.     10/21/21 presents to hospital with a complaint of bilateral foot wound checks and lower extremity edema. Associated with bilateral lower extremity pain. EMS found him hypoxic and managed with supplemental oxygen. During the ED course chest x-ray showing pulmonary vascular congestion without overt pulmonary edema as well as left basilar airspace disease most likely atelectasis. Started on IV diuresis.  Admitted to hospital and podiatry and wound care have been asked to see him for his condition.     Wound cultures from the right and left foot suggestive of multiple organisms and I have been asked to see him in consultation.     MICROBIOLOGY     7/28/18            R leg    MSSA                                      Pseudomonas     10/21/21          Blood   Negative     10/21/21          R foot   Heavy Gram Negative Rods MULTIPLE COLONY TYPES                                      checking for possible BETA STREPTOCOCCUS     10/21/21          L foot   Heavy Streptococci, beta hemolyticgroup C     Myroides species     Proteus vulgaris     Morganella morganii ssp morganii     ASSESSMENT AND RECOMMENDATIONS     1) Chronic venous stasis of the bilateral lower extremities further complicated by complex soft tissue infection.      Zosyn iv per pharmacy dosing through 11/1/21   Anticipate transfer to skilled nursing facility              Wound care inpatient and outpatient        Electronically signed by Tanya Bowman MD on 10/26/2021 at 12:54 PM

## 2021-10-26 NOTE — PROGRESS NOTES
Problem: Self Care Deficits Care Plan (Adult)  Goal: *Acute Goals and Plan of Care (Insert Text)  Description: Pt will be MI sup<->sit in prep for EOB ADL's  Pt will be MI  LB dressing EOB level  Pt will be MI  sit<-> prep for toilet transfer  Pt will be MI  toilet transfer with LRAD  Pt will be MI  toileting/cloth mgmt LRAD  Pt will be MI  grooming standing sink  Pt will be MI bathing sitting/standing sink LRAD  Pt will be MI milagros UE HEP in prep for self care tasks    Outcome: Not Met     OCCUPATIONAL THERAPY EVALUATION  Patient: Ping Xavier (82 y.o. male)  Date: 10/26/2021  Primary Diagnosis: Multiple open wounds of foot [S91.309A]  Bilateral leg edema [R60.0]        Precautions: Fall    ASSESSMENT  Patient is [de-identified] y/o male came to [facility] adm 10/21/2021 for milagros foot wounds, lymphedema, PVD, hypoxia (on new 2L O2 via NC), MAURILIO, hypotension. Pt has hx of chronic lymphedema, PVD, HTN, hypercholesterolemia, chilles tendon repair, aortic aneurysm. Pt received semi supine in bed A&Ox4 and agreeable for OT eval and OT/PT tx. Per pt report, pt lives alone in one story home with 4 steps right rail to enter and is primarily MI for self care (using shower chair, has raised toilet seat and reacher) and MI functional transfers/mobility using rollator (also has upright walker and RW) and reporting 3 falls in past 3 months and personal care aid M/W/F from 8-12 and son checks on pt every day. Pt reporting sleeping in recliner and requiring assist for donning socks at all times. Pt currently presents with decreased balance, decreased activity tolerance, decreased safety awareness, generalized weakness and increased need for assist with self care (min A toileting/cloth mgmt, total A LB dressing, SBA grooming EOB) and functional transfers/mobility (SBA sup->sit and scooting EOB, min A sit<->stand from bed with bed raised and mod Ax2 from commode and min Ax2 toilet transfer with Rw and gait belt).  Pt would benefit from continued skilled OT services while at [facility] in order to increase safety and independence with self care and functional transfers/mobility. Recommend discharge to SNF when medically appropriate. Other factors to consider for discharge: time since onset, severity of deficits, PLOF        PLAN :  Recommendations and Planned Interventions: self care training, functional mobility training, therapeutic exercise, balance training, therapeutic activities, endurance activities, patient education, and home safety training    Frequency/Duration: Patient will be followed by occupational therapy 3-5x/week to address goals. Recommendation for discharge: (in order for the patient to meet his/her long term goals)  Solo Hudson    This discharge recommendation:  Has been made in collaboration with the attending provider and/or case management    IF patient discharges home will need the following DME: TBD       SUBJECTIVE:   Patient stated I feel a bit lightheaded.     OBJECTIVE DATA SUMMARY:   HISTORY:   Past Medical History:   Diagnosis Date    High cholesterol     Hx of long term use of blood thinners     aspirin    Hyperlipidemia     Hypertension     Lymphedema     Vascular disease      Past Surgical History:   Procedure Laterality Date    HX OTHER SURGICAL      repair of tendon achilles    HX OTHER SURGICAL  2012    aortic anrysm 6/> cm diamater       Expanded or extensive additional review of patient history:     Home Situation  Home Environment: Private residence  # Steps to Enter: 4  Rails to Enter: Yes  Hand Rails : Right  One/Two Story Residence: One story  Living Alone: Yes  Support Systems: Other Family Member(s)  Patient Expects to be Discharged to[de-identified] Rehabilitation facility  Current DME Used/Available at Home: Shower chair, Walker, rollator, Walker, rolling    PLOF: Pt MI for ADLS/IADLS, MI with mobility prior to admission.      EXAMINATION OF PERFORMANCE DEFICITS:  Cognitive/Behavioral Status:  Neurologic State: Alert  Orientation Level: Oriented X4  Cognition: Decreased attention/concentration; Follows commands     Hearing: Auditory  Auditory Impairment: None    Range of Motion:  AROM: Generally decreased, functional  PROM: Generally decreased, functional     Strength:  Strength: Generally decreased, functional (milagros UE grossly observed to be 3+/5)     Balance:  Sitting: Impaired; With support  Sitting - Static: Good (unsupported)  Sitting - Dynamic: Fair (occasional)  Standing: Impaired;Pull to stand; With support  Standing - Static: Constant support; Fair  Standing - Dynamic : Constant support; Fair    Functional Mobility and Transfers for ADLs:  Bed Mobility:  Rolling: Stand-by assistance  Supine to Sit: Stand-by assistance  Sit to Supine: Moderate assistance;Assist x2  Scooting: Stand-by assistance    Transfers:  Sit to Stand: Minimum assistance; Moderate assistance;Assist x2  Stand to Sit: Minimum assistance; Moderate assistance;Assist x2  Bed to Chair: Minimum assistance; Moderate assistance;Assist x2  Toilet Transfer : Minimum assistance;Assist x2  Assistive Device : Gait Belt;Walker, rolling    ADL Assessment:     Oral Facial Hygiene/Grooming: Stand-by assistance    Lower Body Dressing: Total assistance    Toileting: Minimum assistance     ADL Intervention and task modifications:     Grooming  Grooming Assistance: Stand-by assistance  Position Performed: Seated edge of bed  Washing Face: Stand-by assistance  Washing Hands: Stand-by assistance    Lower Body Dressing Assistance  Socks:  Total assistance (dependent)  Position Performed: Seated edge of bed    Toileting  Toileting Assistance: Contact guard assistance  Bladder Hygiene: Stand-by assistance  Bowel Hygiene: Stand-by assistance  Clothing Management: Contact guard assistance    [facility] [address] [address] AM-PACTM \"6 Clicks\"                                                       Daily Activity Inpatient Short Form  How much help from another person does the patient currently need. .. Total; A Lot A Little None   1. Putting on and taking off regular lower body clothing? [x]  1 []  2 []  3 []  4   2. Bathing (including washing, rinsing, drying)? []  1 [x]  2 []  3 []  4   3. Toileting, which includes using toilet, bedpan or urinal? [] 1 [x]  2 []  3 []  4   4. Putting on and taking off regular upper body clothing? []  1 []  2 [x]  3 []  4   5. Taking care of personal grooming such as brushing teeth? []  1 []  2 [x]  3 []  4   6. Eating meals? []  1 []  2 []  3 [x]  4   © 2007, Trustees of [address] [place] [address] [address] [address], under license to [employer]. All rights reserved     Score: 15/24     Interpretation of Tool:  Represents clinically-significant functional categories (i.e. Activities of daily living). Percentage of Impairment CH    0%   CI    1-19% CJ    20-39% CK    40-59% CL    60-79% CM    80-99% CN     100%   [facility]  Score 6-24 24 23 20-22 15-19 10-14 7-9 6        Occupational Therapy Evaluation Charge Determination   History Examination Decision-Making   LOW Complexity : Brief history review  MEDIUM Complexity : 3-5 performance deficits relating to physical, cognitive , or psychosocial skils that result in activity limitations and / or participation restrictions MEDIUM Complexity : Patient may present with comorbidities that affect occupational performnce. Miniml to moderate modification of tasks or assistance (eg, physical or verbal ) with assesment(s) is necessary to enable patient to complete evaluation       Based on the above components, the patient evaluation is determined to be of the following complexity level: LOW   Pain Rating:  No pain reported    Activity Tolerance:   Good and requires rest breaks  Please refer to the flowsheet for vital signs taken during this treatment.     After treatment patient left in no apparent distress:    Supine in bed, Call bell within reach, Bed / chair alarm activated, and transport present    COMMUNICATION/EDUCATION: The patients plan of care was discussed with: Physical therapy assistant and Registered nurse. PT/OT sessions occurred together for increased safety of pt and clinician. Home safety education was provided and the patient/caregiver indicated understanding. and Patient/family have participated as able in goal setting and plan of care. This patients plan of care is appropriate for delegation to [facility].     Thank you for this referral.  Brenda Salcido  Time Calculation: 25 mins

## 2021-10-26 NOTE — PROGRESS NOTES
Hospitalist Progress Note    Subjective:   Daily Progress Note: 10/26/2021 5:13 PM    Hospital Course:  Pt admitted for complaints of bilateral foot wounds, and leg edema. Xray of right foot showing osteopenia, Infectious disease and wound RN consulted for recommendations. Right and Left foot wound culture growing heavy streptococci, myroides, proteun, morganella, pasteurella. ID consulted for IV antibiotic management. Subjective: Pt seen in room, up with PT at side of bed.    No complaints of pain    Current Facility-Administered Medications   Medication Dose Route Frequency    furosemide (LASIX) injection 40 mg  40 mg IntraVENous BID    metoprolol succinate (TOPROL-XL) XL tablet 50 mg  50 mg Oral DAILY    dilTIAZem IR (CARDIZEM) tablet 30 mg  30 mg Oral TIDAC    ammonium lactate (AMLACTIN) 12 % cream   Topical DAILY    zinc oxide-white petrolatum 17-57 % topical paste   Topical TID    heparin (porcine) injection 5,000 Units  5,000 Units SubCUTAneous Q8H    sodium chloride (NS) flush 5-40 mL  5-40 mL IntraVENous Q8H    sodium chloride (NS) flush 5-40 mL  5-40 mL IntraVENous PRN    acetaminophen (TYLENOL) tablet 650 mg  650 mg Oral Q6H PRN    Or    acetaminophen (TYLENOL) suppository 650 mg  650 mg Rectal Q6H PRN    polyethylene glycol (MIRALAX) packet 17 g  17 g Oral DAILY PRN    ondansetron (ZOFRAN ODT) tablet 4 mg  4 mg Oral Q8H PRN    Or    ondansetron (ZOFRAN) injection 4 mg  4 mg IntraVENous Q6H PRN    aspirin delayed-release tablet 81 mg  81 mg Oral DAILY    atorvastatin (LIPITOR) tablet 40 mg  40 mg Oral DAILY    hydrALAZINE (APRESOLINE) 20 mg/mL injection 20 mg  20 mg IntraVENous Q6H PRN    budesonide-formoteroL (SYMBICORT) 160-4.5 mcg/actuation HFA inhaler 2 Puff  2 Puff Inhalation BID RT    influenza vaccine 2021-22 (6 mos+)(PF) (FLUARIX/FLULAVAL/FLUZONE QUAD) injection 0.5 mL  1 Each IntraMUSCular PRIOR TO DISCHARGE    piperacillin-tazobactam (ZOSYN) 3.375 g in 0.9% sodium chloride (MBP/ADV) 100 mL MBP  3.375 g IntraVENous Q8H        Review of Systems:    Review of Systems   Constitutional: Negative for chills and fever. HENT: Negative for congestion and sore throat. Respiratory: Negative for cough and hemoptysis. Cardiovascular: Negative for chest pain and palpitations. Gastrointestinal: Negative for heartburn and nausea. Objective:     Visit Vitals  /60 (BP 1 Location: Right upper arm, BP Patient Position: At rest)   Pulse (!) 52   Temp 97.7 °F (36.5 °C)   Resp 18   Ht 6' (1.829 m)   Wt 113.4 kg (250 lb)   SpO2 96%   BMI 33.91 kg/m²    O2 Flow Rate (L/min): 2 l/min O2 Device: Nasal cannula    Temp (24hrs), Av.6 °F (36.4 °C), Min:97.2 °F (36.2 °C), Max:97.9 °F (36.6 °C)      10/26 0701 - 10/26 1900  In: -   Out: 374 [BHKLF:902]  10/24 1901 - 10/26 0700  In: 300 [P.O.:100; I.V.:200]  Out: 1390 [Urine:1390]    PHYSICAL EXAM:    Physical Exam   Constitutional:       General: He is not in acute distress.     Appearance: He is obese. HENT:      Mouth/Throat:      Dentition: Abnormal dentition. Dental caries present. Cardiovascular:      Rate and Rhythm: Normal rate and regular rhythm.      Pulses: Normal pulses.      Heart sounds: Normal heart sounds. Pulmonary:      Effort: Pulmonary effort is normal.      Breath sounds: Normal breath sounds. Abdominal:      General: Bowel sounds are normal.      Palpations: Abdomen is soft. Genitourinary:     Comments: voiding  clear yellow urine.   Skin:     General: Skin is warm and dry.      Comments: Bilateral feet with dressings, sacrum with redness  Neurological:      Mental Status: He is oriented to person, place, and time.     Data Review    Recent Results (from the past 24 hour(s))   RENAL FUNCTION PANEL    Collection Time: 10/26/21  7:45 AM   Result Value Ref Range    Sodium 145 136 - 145 mmol/L    Potassium 4.0 3.5 - 5.1 mmol/L    Chloride 109 (H) 97 - 108 mmol/L    CO2 30 21 - 32 mmol/L    Anion gap 6 5 - 15 mmol/L Glucose 107 (H) 65 - 100 mg/dL     (H) 6 - 20 mg/dL    Creatinine 3.47 (H) 0.70 - 1.30 mg/dL    BUN/Creatinine ratio 37 (H) 12 - 20      GFR est AA 21 (L) >60 ml/min/1.73m2    GFR est non-AA 17 (L) >60 ml/min/1.73m2    Calcium 8.8 8.5 - 10.1 mg/dL    Phosphorus 3.8 2.6 - 4.7 mg/dL    Albumin 2.5 (L) 3.5 - 5.0 g/dL   CBC W/O DIFF    Collection Time: 10/26/21  7:45 AM   Result Value Ref Range    WBC 14.3 (H) 4.1 - 11.1 K/uL    RBC 3.40 (L) 4.10 - 5.70 M/uL    HGB 9.5 (L) 12.1 - 17.0 g/dL    HCT 32.1 (L) 36.6 - 50.3 %    MCV 94.4 80.0 - 99.0 FL    MCH 27.9 26.0 - 34.0 PG    MCHC 29.6 (L) 30.0 - 36.5 g/dL    RDW 15.0 (H) 11.5 - 14.5 %    PLATELET 911 206 - 078 K/uL    MPV 10.9 8.9 - 12.9 FL    NRBC 0.1 (H) 0.0  WBC    ABSOLUTE NRBC 0.02 (H) 0.00 - 0.01 K/uL   NT-PRO BNP    Collection Time: 10/26/21  7:45 AM   Result Value Ref Range    NT pro-BNP >35,000 (H) <450 pg/mL   PTH INTACT    Collection Time: 10/26/21  7:45 AM   Result Value Ref Range    Calcium 9.0 8.5 - 10.1 mg/dL    PTH, Intact PENDING pg/mL   IRON PROFILE    Collection Time: 10/26/21  7:45 AM   Result Value Ref Range    Iron 71 35 - 150 ug/dL    TIBC 206 (L) 250 - 450 ug/dL    Iron % saturation 34 20 - 50 %   FERRITIN    Collection Time: 10/26/21  7:45 AM   Result Value Ref Range    Ferritin 105 26 - 388 ng/mL   LOWER EXT ART PVR MULT LEVEL SEG PRESSURES    Collection Time: 10/26/21 11:24 AM   Result Value Ref Range    Right dorsalis pedis  mmHg    Left dorsalis pedis  mmHg    Left arm  mmHg    Right arm  mmHg    Left posterior tibial 184 mmHg    Right posterior tibial 144 mmHg    Left RIOS 1.20     Right RIOS 1.01    ECHO ADULT COMPLETE    Collection Time: 10/26/21 12:30 PM   Result Value Ref Range    LV ED Vol A2C 87.00 cm3    LV ED Vol A4C 40.00 cm3    LV ES Vol A4C 58.00 cm3    LV ES Vol A2C 68.40 cm3    IVSd 1.26 (A) 0.60 - 1.00 cm    LVIDd 5.47 4.20 - 5.90 cm    LVIDs 4.09 cm    LVOT d 2.60 cm    Left Ventricular Outflow Tract Mean Gradient 3.00 mmHg    LVOT VTI 26.70 cm    LVOT Peak Velocity 116.00 cm/s    LVOT Peak Gradient 5.00 mmHg    LVPWd 0.88 0.60 - 1.00 cm    LV E' Septal Velocity 4.78 cm/s    E/E' septal 11.21     LVOT .0 cm3    Aortic Regurgitant Pressure Half-time 641.00 ms    AR Max Nadir 416.00 cm/s    Aortic Valve Systolic Peak Velocity 671.90 cm/s    AoV PG 43.00 mmHg    Aortic Valve Systolic Mean Gradient 56.28 mmHg    AoV VTI 77.00 cm    Aortic valve mean velocity 215.00 cm/s    Aortic Valve Area by Continuity of VTI 1.84 cm2    Aortic Valve Area by Continuity of Peak Velocity 1.88 cm2    Ao Root D 3.30 cm    AO ASC D 3.80 cm    Left Atrium Major Axis 4.50 cm    Mitral Valve Max Velocity 95.00 cm/s    MV Peak Gradient 4.00 mmHg    Mitral Valve Deceleration Bonner 1,700.00 mm/s2    Mitral Valve Deceleration Bonner 2,030.00 mm/s2    Mitral Valve Deceleration Bonner 1,870.00 mm/s2    Mitral Valve Pressure Half-time 83.00 ms    MV A Nadir 87.60 cm/s    MV E Nadir 53.60 cm/s    MV Mean Gradient 1.00 mmHg    Mitral Valve Annulus Velocity Time Integral 38.40 cm    MVA (PHT) 2.65 cm2    MV E/A 0.61     Pulmonic Valve Systolic Mean Gradient 7.09 mmHg    Pulmonic Valve Systolic Velocity Time Integral 25.20 cm    Pulmonic Valve Max Velocity 156.00 cm/s    Pulmonic Valve Systolic Peak Instantaneous Gradient 10.00 mmHg    Tricuspid Valve Max Velocity 142.00 cm/s    Triscuspid Valve Regurgitation Peak Gradient 8.00 mmHg    LV Mass .1 88.0 - 224.0 g    LV Mass AL Index 98.8 49.0 - 115.0 g/m2    Left Atrium Minor Axis 1.92 cm    LUCIANA/BSA Pk Nadir 0.8 cm2/m2    LUCIANA/BSA VTI 0.8 cm2/m2       LOWER EXT ART PVR MULT LEVEL SEG PRESSURES         DUPLEX LOWER EXT VENOUS BILAT   Final Result      US RETROPERITONEUM COMP   Final Result   1. Medical renal disease. No hydronephrosis. 2.  Numerous large bilateral renal cysts. 3.  Urinary bladder postvoid residual of 103 mL.          XR CHEST PORT   Final Result   Worsening pulmonary edema, suggesting CHF or volume overload. XR CHEST PORT   Final Result      XR FOOT LT AP/LAT   Final Result   No radiographic evidence of osteomyelitis. XR FOOT RT AP/LAT   Final Result   Nonspecific nonfocal osteopenia. Active Problems:    Multiple open wounds of foot (10/21/2021)      Bilateral leg edema (10/22/2021)      Acute kidney injury ([facility] [address] (10/23/2021)        Assessment/Plan:   1. Bilateral foot ulcers- secondary to chronic venous stasis, ID following,  Cultures w/ multiorganisms, IV zosyn until 11/1 , midline inserted today. Lower leg venous duplex done, no DVTs, vascular recommending compression wraps for both legs. Wound care ordered, aquacel Ag to both feet.      2.  Acute on chronic kidney disease- nephrology following, creatinine  3.47/,  Repeat bmp in am.      3. CHF- diastolic dysfunction, EF 87-47%, reduced systolic function, aortic valve regurgitation, with RVAP at 11. Switch to PO lasix bid, cardiology following,     4. Generalized weakness- OT/PT , OOB to chair as tolerated    5. Atrial flutter- paroxsymal , on cardizem, cardiology following,   Continue telemetry     6.  Discharge-  SNF acceptance, plan tomorrow              DVT Prophylaxis: heparin sq  Code Status:  Full Code  POA: [facility] Danna Parada [phone]   ____CM, patient, staff nurse___________________________________________________________    Omar De Leon NP

## 2021-10-26 NOTE — PROGRESS NOTES
Renal Progress Note    Patient: Sarai Mercer MRN: [id number]  SSN: [id number]    YOB: 1940  Age: [de-identified] y.o. Sex: male      Admit Date: 10/21/2021    LOS: 4 days     Subjective:   Patient seen at bedside. Alert and awake, no acute distress.    Claims frequent urination with diuretics  No SOB,   Creat down to 3.47 today    Current Facility-Administered Medications   Medication Dose Route Frequency    furosemide (LASIX) injection 40 mg  40 mg IntraVENous BID    metoprolol succinate (TOPROL-XL) XL tablet 50 mg  50 mg Oral DAILY    dilTIAZem IR (CARDIZEM) tablet 30 mg  30 mg Oral TIDAC    ammonium lactate (AMLACTIN) 12 % cream   Topical DAILY    zinc oxide-white petrolatum 17-57 % topical paste   Topical TID    heparin (porcine) injection 5,000 Units  5,000 Units SubCUTAneous Q8H    sodium chloride (NS) flush 5-40 mL  5-40 mL IntraVENous Q8H    sodium chloride (NS) flush 5-40 mL  5-40 mL IntraVENous PRN    acetaminophen (TYLENOL) tablet 650 mg  650 mg Oral Q6H PRN    Or    acetaminophen (TYLENOL) suppository 650 mg  650 mg Rectal Q6H PRN    polyethylene glycol (MIRALAX) packet 17 g  17 g Oral DAILY PRN    ondansetron (ZOFRAN ODT) tablet 4 mg  4 mg Oral Q8H PRN    Or    ondansetron (ZOFRAN) injection 4 mg  4 mg IntraVENous Q6H PRN    aspirin delayed-release tablet 81 mg  81 mg Oral DAILY    atorvastatin (LIPITOR) tablet 40 mg  40 mg Oral DAILY    hydrALAZINE (APRESOLINE) 20 mg/mL injection 20 mg  20 mg IntraVENous Q6H PRN    budesonide-formoteroL (SYMBICORT) 160-4.5 mcg/actuation HFA inhaler 2 Puff  2 Puff Inhalation BID RT    influenza vaccine 2021-22 (6 mos+)(PF) (FLUARIX/FLULAVAL/FLUZONE QUAD) injection 0.5 mL  1 Each IntraMUSCular PRIOR TO DISCHARGE    piperacillin-tazobactam (ZOSYN) 3.375 g in 0.9% sodium chloride (MBP/ADV) 100 mL MBP  3.375 g IntraVENous Q8H        Vitals:    10/26/21 0349 10/26/21 0920 10/26/21 0926 10/26/21 1200   BP:   (!) 144/54    Pulse: 63  (!) 56 70   Resp: 18    Temp:   97.6 °F (36.4 °C)    SpO2:  98% 98%    Weight:       Height:         Objective:   General: alert awake well-oriented, no acute distress. HEENT: EOMI, no Icterus, no Pallor,  mucosa moist.  Neck: Neck is supple, No JVD  Lungs: breathsounds normal,  no rhonchi, no rales,   CVS: heart sounds normal,  no murmurs, no rubs. GI: soft, nontender, normal BS. Extremeties: no cyanosis, 1+  edema,   Neuro: Alert, awake, oriented x3, No new focal deficits, moving all extremeties well. Skin: normal skin turgor, no skin rashes. Intake and Output:  Current Shift: 10/26 0701 - 10/26 1900  In: -   Out: 175 [Urine:175]  Last three shifts: 10/24 1901 - 10/26 0700  In: 300 [P.O.:100; I.V.:200]  Out: 1390 [Urine:1390]      Lab/Data Review:  Recent Labs     10/26/21  0745 10/25/21  0502 10/24/21  0601   WBC 14.3* 11.6* 11.5*   HGB 9.5* 9.5* 10.0*   HCT 32.1* 32.5* 34.1*    233 220     Recent Labs     10/26/21  0745 10/25/21  0502 10/24/21  0601    145 144   K 4.0 4.1 4.0   * 111* 111*   CO2 30 29 27   * 105* 111*   * 88* 71*   CREA 3.47* 3.52* 3.89*   CA 8.8 8.6 8.6   PHOS 3.8 3.3  --    ALB 2.5* 2.3*  --      No results for input(s): PH, PCO2, PO2, HCO3, FIO2 in the last 72 hours.   Recent Results (from the past 24 hour(s))   RENAL FUNCTION PANEL    Collection Time: 10/26/21  7:45 AM   Result Value Ref Range    Sodium 145 136 - 145 mmol/L    Potassium 4.0 3.5 - 5.1 mmol/L    Chloride 109 (H) 97 - 108 mmol/L    CO2 30 21 - 32 mmol/L    Anion gap 6 5 - 15 mmol/L    Glucose 107 (H) 65 - 100 mg/dL     (H) 6 - 20 mg/dL    Creatinine 3.47 (H) 0.70 - 1.30 mg/dL    BUN/Creatinine ratio 37 (H) 12 - 20      GFR est AA 21 (L) >60 ml/min/1.73m2    GFR est non-AA 17 (L) >60 ml/min/1.73m2    Calcium 8.8 8.5 - 10.1 mg/dL    Phosphorus 3.8 2.6 - 4.7 mg/dL    Albumin 2.5 (L) 3.5 - 5.0 g/dL   CBC W/O DIFF    Collection Time: 10/26/21  7:45 AM   Result Value Ref Range    WBC 14.3 (H) 4.1 - 11.1 K/uL    RBC 3.40 (L) 4.10 - 5.70 M/uL    HGB 9.5 (L) 12.1 - 17.0 g/dL    HCT 32.1 (L) 36.6 - 50.3 %    MCV 94.4 80.0 - 99.0 FL    MCH 27.9 26.0 - 34.0 PG    MCHC 29.6 (L) 30.0 - 36.5 g/dL    RDW 15.0 (H) 11.5 - 14.5 %    PLATELET 682 540 - 478 K/uL    MPV 10.9 8.9 - 12.9 FL    NRBC 0.1 (H) 0.0  WBC    ABSOLUTE NRBC 0.02 (H) 0.00 - 0.01 K/uL   NT-PRO BNP    Collection Time: 10/26/21  7:45 AM   Result Value Ref Range    NT pro-BNP >35,000 (H) <450 pg/mL   LOWER EXT ART PVR MULT LEVEL SEG PRESSURES    Collection Time: 10/26/21 11:24 AM   Result Value Ref Range    Right dorsalis pedis  mmHg    Left dorsalis pedis  mmHg    Left arm  mmHg    Right arm  mmHg    Left posterior tibial 184 mmHg    Right posterior tibial 144 mmHg    Left RIOS 1.20     Right RIOS 1.01    ECHO ADULT COMPLETE    Collection Time: 10/26/21 12:40 PM   Result Value Ref Range    LV ED Vol A2C 87.00 cm3    LV ED Vol A4C 40.00 cm3    LV ED Vol A2C 164.00 cm3    LV ES Vol A4C 58.00 cm3    LV ES Vol A2C 68.40 cm3    IVSd 1.26 (A) 0.60 - 1.00 cm    LVIDd 5.47 4.20 - 5.90 cm    LVIDs 4.09 cm    LVOT d 2.60 cm    Left Ventricular Outflow Tract Mean Gradient 3.00 mmHg    LVOT VTI 26.70 cm    LVOT VTI 26.70 cm    LVOT Peak Velocity 116.00 cm/s    LVOT Peak Gradient 5.00 mmHg    LVPWd 0.88 0.60 - 1.00 cm    LV E' Septal Velocity 4.78 cm/s    E/E' lateral 12.20     E/E' septal 11.20     Left Ventricular Outflow Tract Cross Section Area 5.31 cm2    LVOT .0 cm3    Aortic Regurgitant Pressure Half-time 641.00 ms    AR Max Nadir 416.00 cm/s    Aortic Valve Systolic Peak Velocity 098.10 cm/s    AoV PG 43.00 mmHg    Aortic Valve Systolic Mean Gradient 96.42 mmHg    AoV VTI 77.00 cm    Aortic valve mean velocity 215.00 cm/s    Aortic Valve Area by Continuity of VTI 1.84 cm2    Aortic Valve Area by Continuity of Peak Velocity 1.88 cm2    Ao Root D 3.30 cm    AO ASC D 3.80 cm    Left Atrium Major Axis 4.50 cm    Left Atrium Major Axis 4.50 cm    MR Peak Gradient 2.00 mmHg    Mitral Valve Max Velocity 95.00 cm/s    MV Peak Gradient 4.00 mmHg    Mitral Valve Deceleration [place] 1,700.00 mm/s2    Mitral Valve Deceleration [place] 2,030.00 mm/s2    Mitral Valve Deceleration [place] 1,870.00 mm/s2    Mitral Valve Pressure Half-time 83.00 ms    MV A Nadir 87.60 cm/s    MV E Nadir 53.60 cm/s    MV Mean Gradient 1.00 mmHg    Mitral Valve Annulus Velocity Time Integral 38.40 cm    MVA (PHT) 2.65 cm2    MV E/A 0.60     Pulmonic Valve Systolic Mean Gradient 4.02 mmHg    Pulmonic Valve Systolic Velocity Time Integral 25.20 cm    Pulmonic Valve Max Velocity 156.00 cm/s    Pulmonic Valve Systolic Peak Instantaneous Gradient 10.00 mmHg    Tricuspid Valve Max Velocity 142.00 cm/s    Triscuspid Valve Regurgitation Peak Gradient 8.00 mmHg        Assessment and Plan:     1 acute kidney injury on underlying chronic kidney disease stage unknown.    -Etiology is prerenal azotemia from cardiorenal.    -On admission BUN/creatinine 47/2.8 and has gone up to 71/3.8.  ,   creat down to 3.52--3.47 today  Continue diuretics for fluid overload/edema  We will continue to monitor renal functions    2. Chronic kidney disease, probably has a stage IV chronic kidney disease as baseline  Renal ultrasound showed larger kidneys with multiple bilateral cysts suggestive of polycystic kidney disease. significant proteinuria+, will avoid ace-I for now for risk of MAURILIO   intact PTH and vitamin D levels are pending  will continue to monitor renal functions to assess the baseline    2. Hypotension. Improved hemodynamic status now  Amlodipine was discontinued  Continue to monitor blood pressures     3. CHF.  -He is admitted with decompensated CHF.    -2D echocardiogram has been ordered.    -On admission he was with hypoxic respiratory failure and lower extremity edema.    -Improved clinically with the diuresis, continue current Lasix and continue to monitor inputs and outputs and renal functions     4.  Bilateral lower extremity wounds.    -On IV antibiotics as per ID recommendations.   Vascular surgery evaluating for vascular insufficiency      Signed By: Susan Jacobsen MD     October 26, 2021

## 2021-10-26 NOTE — PROGRESS NOTES
Cardiology Progress Note      10/26/2021 11:55 AM    Admit Date: 10/21/2021    Admit Diagnosis: Multiple open wounds of foot [S91.309A]  Bilateral leg edema [R60.0]      Subjective:     Patient seen and examined; he has no new complaints. No significant overnight events.     Visit Vitals  BP (!) 144/54 (BP 1 Location: Right upper arm, BP Patient Position: Sitting)   Pulse (!) 56   Temp 97.6 °F (36.4 °C)   Resp 18   Ht 6' (1.829 m)   Wt 113.4 kg (250 lb)   SpO2 98%   BMI 33.91 kg/m²     Current Facility-Administered Medications   Medication Dose Route Frequency    furosemide (LASIX) injection 40 mg  40 mg IntraVENous BID    metoprolol succinate (TOPROL-XL) XL tablet 50 mg  50 mg Oral DAILY    dilTIAZem IR (CARDIZEM) tablet 30 mg  30 mg Oral TIDAC    ammonium lactate (AMLACTIN) 12 % cream   Topical DAILY    zinc oxide-white petrolatum 17-57 % topical paste   Topical TID    heparin (porcine) injection 5,000 Units  5,000 Units SubCUTAneous Q8H    sodium chloride (NS) flush 5-40 mL  5-40 mL IntraVENous Q8H    sodium chloride (NS) flush 5-40 mL  5-40 mL IntraVENous PRN    acetaminophen (TYLENOL) tablet 650 mg  650 mg Oral Q6H PRN    Or    acetaminophen (TYLENOL) suppository 650 mg  650 mg Rectal Q6H PRN    polyethylene glycol (MIRALAX) packet 17 g  17 g Oral DAILY PRN    ondansetron (ZOFRAN ODT) tablet 4 mg  4 mg Oral Q8H PRN    Or    ondansetron (ZOFRAN) injection 4 mg  4 mg IntraVENous Q6H PRN    aspirin delayed-release tablet 81 mg  81 mg Oral DAILY    atorvastatin (LIPITOR) tablet 40 mg  40 mg Oral DAILY    hydrALAZINE (APRESOLINE) 20 mg/mL injection 20 mg  20 mg IntraVENous Q6H PRN    budesonide-formoteroL (SYMBICORT) 160-4.5 mcg/actuation HFA inhaler 2 Puff  2 Puff Inhalation BID RT    influenza vaccine 2021-22 (6 mos+)(PF) (FLUARIX/FLULAVAL/FLUZONE QUAD) injection 0.5 mL  1 Each IntraMUSCular PRIOR TO DISCHARGE    piperacillin-tazobactam (ZOSYN) 3.375 g in 0.9% sodium chloride (MBP/ADV) 100 mL MBP 3.375 g IntraVENous Q8H         Objective:      Physical Exam:  Visit Vitals  BP (!) 144/54 (BP 1 Location: Right upper arm, BP Patient Position: Sitting)   Pulse (!) 56   Temp 97.6 °F (36.4 °C)   Resp 18   Ht 6' (1.829 m)   Wt 113.4 kg (250 lb)   SpO2 98%   BMI 33.91 kg/m²     General Appearance:  Well developed, well nourished,alert and oriented x 3, and individual in no acute distress. Ears/Nose/Mouth/Throat:   Hearing grossly normal.         Neck: Supple. Chest:   Lungs clear to auscultation bilaterally. Cardiovascular:  Regular rate and rhythm, S1, S2 normal, no murmur. Abdomen:   Soft, non-tender, bowel sounds are active. Extremities: Chronic edema bilaterally. Skin: Warm and dry.                Data Review:   Labs:    Recent Results (from the past 24 hour(s))   RENAL FUNCTION PANEL    Collection Time: 10/26/21  7:45 AM   Result Value Ref Range    Sodium 145 136 - 145 mmol/L    Potassium 4.0 3.5 - 5.1 mmol/L    Chloride 109 (H) 97 - 108 mmol/L    CO2 30 21 - 32 mmol/L    Anion gap 6 5 - 15 mmol/L    Glucose 107 (H) 65 - 100 mg/dL     (H) 6 - 20 mg/dL    Creatinine 3.47 (H) 0.70 - 1.30 mg/dL    BUN/Creatinine ratio 37 (H) 12 - 20      GFR est AA 21 (L) >60 ml/min/1.73m2    GFR est non-AA 17 (L) >60 ml/min/1.73m2    Calcium 8.8 8.5 - 10.1 mg/dL    Phosphorus 3.8 2.6 - 4.7 mg/dL    Albumin 2.5 (L) 3.5 - 5.0 g/dL   CBC W/O DIFF    Collection Time: 10/26/21  7:45 AM   Result Value Ref Range    WBC 14.3 (H) 4.1 - 11.1 K/uL    RBC 3.40 (L) 4.10 - 5.70 M/uL    HGB 9.5 (L) 12.1 - 17.0 g/dL    HCT 32.1 (L) 36.6 - 50.3 %    MCV 94.4 80.0 - 99.0 FL    MCH 27.9 26.0 - 34.0 PG    MCHC 29.6 (L) 30.0 - 36.5 g/dL    RDW 15.0 (H) 11.5 - 14.5 %    PLATELET 977 449 - 009 K/uL    MPV 10.9 8.9 - 12.9 FL    NRBC 0.1 (H) 0.0  WBC    ABSOLUTE NRBC 0.02 (H) 0.00 - 0.01 K/uL   NT-PRO BNP    Collection Time: 10/26/21  7:45 AM   Result Value Ref Range    NT pro-BNP >35,000 (H) <450 pg/mL   LOWER EXT ART PVR MULT LEVEL SEG PRESSURES    Collection Time: 10/26/21 11:24 AM   Result Value Ref Range    Right dorsalis pedis  mmHg    Left dorsalis pedis  mmHg    Left arm  mmHg    Right arm  mmHg    Left posterior tibial 184 mmHg    Right posterior tibial 144 mmHg    Left RIOS 1.20     Right RIOS 1.01        Assessment:   Paroxysmal Atrial Flutter? Acute CHF unknown type  Acute on CKD  Chronic Venous Ulcers  Hypertension  Hyperlipidemia     Plan:   -Difficult to assess volume status however radiologic studies suggest volume overload. Continue IV diuresis, will increase to 80 mg IV bid. Renal function has remained unchanged. Follow I&O, daily weight. Replete electrolytes as needed. Await transthoracic echocardiogram to evaluate LV and RV function.  -Per patient he has had atrial fibrillation in the past but he not sure about having been on anticoagulation in the past. His LLOIQ4OCML score is 4, HASBLED score is 3; he has a reoughly comparable stroke as well as bleeding risk. Nonetheless, I think he will benefit from long term anticoagulation. For now we will await his echocardiogram to assess cause of heart failure, and address anticoagulation prior to discharge once a care plan regarding his lower extremity wounds is also definitive. Patient agrees with the plan.   -Rest as per echo findings  -We will follow

## 2021-10-26 NOTE — PROGRESS NOTES
Patient accepted to [facility] and they do have a bed available today. Patient needs midline placed to continue IV ABX. CM will continue to follow.

## 2021-10-27 NOTE — PROGRESS NOTES
Spiritual Care Assessment/Progress Note  [facility]      NAME: Jenifer Gallardo      MRN: [id number]  AGE: [de-identified] y.o.  SEX: male  Yarsanism Affiliation: Oriental orthodox   Language: English     10/27/2021     Total Time (in minutes): 15     Spiritual Assessment begun in [facility] through conversation with:         [x]Patient        [] Family    [] Friend(s)        Reason for Consult: Initial/Spiritual assessment, patient floor     Spiritual beliefs: (Please include comment if needed)     [x] Identifies with a shaylee tradition:  Oriental orthodox        [] Supported by a shaylee community:            [] Claims no spiritual orientation:           [] Seeking spiritual identity:                [] Adheres to an individual form of spirituality:           [] Not able to assess:                           Identified resources for coping:      [x] Prayer                               [] Music                  [] Guided Imagery     [] Family/friends                 [] Pet visits     [] Devotional reading                         [] Unknown     [] Other:                                               Interventions offered during this visit: (See comments for more details)    Patient Interventions: Affirmation of emotions/emotional suffering, Affirmation of shaylee, Catharsis/review of pertinent events in supportive environment, Coping skills reviewed/reinforced, Iconic (affirming the presence of God/Higher Power), Initial/Spiritual assessment, patient floor, Life review/legacy, Normalization of emotional/spiritual concerns, Prayer (actual), Prayer (assurance of)           Plan of Care:     [] Support spiritual and/or cultural needs    [] Support AMD and/or advance care planning process      [] Support grieving process   [] Coordinate Rites and/or Rituals    [] Coordination with community clergy   [] No spiritual needs identified at this time   [] Detailed Plan of Care below (See Comments)  [] Make referral to Music Therapy  [] Make referral to Pet Therapy     [] Make referral to Addiction services  [] Make referral to [facility]  [] Make referral to Spiritual Care Partner  [] No future visits requested        [x] Follow up upon further referrals     Comments:  Visited patient in the [facility] unit for initial assessment. Patient was alone during the visit. Patient stated he is doing as well as he could shared about future care plans. Patient does not seem to have much family support and stated most are no longer around anymore. 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 Provided chaplaincy education, listened with empathy while exploring his needs and priorities. Facilitated storytelling and affirmed his trust in God as well as his health care needs. Offered and provided prayer per his request for which he expressed gratitude. Advised of  availability. Chaplains will follow up if further referrals are requested. Chaplain Cinthya Garland M.Div.    can be reached by calling the  at [facility]  [phone]

## 2021-10-27 NOTE — PROGRESS NOTES
Hospitalist Progress Note    Subjective:   Daily Progress Note: 10/27/2021 7:14 PM    Hospital Course:  Pt admitted for complaints of bilateral foot wounds, and leg edema. Xray of right foot showing osteopenia, Infectious disease and wound RN consulted for recommendations. Right and Left foot wound culture growing heavy streptococci, myroides, proteun, morganella, pasteurella.  ID consulted for IV antibiotic management.     Subjective: Pt seen in room this morning, hemoglobin dropped to 7.6 today    Current Facility-Administered Medications   Medication Dose Route Frequency    furosemide (LASIX) tablet 40 mg  40 mg Oral BID    metoprolol succinate (TOPROL-XL) XL tablet 50 mg  50 mg Oral DAILY    dilTIAZem IR (CARDIZEM) tablet 30 mg  30 mg Oral TIDAC    ammonium lactate (AMLACTIN) 12 % cream   Topical DAILY    zinc oxide-white petrolatum 17-57 % topical paste   Topical TID    heparin (porcine) injection 5,000 Units  5,000 Units SubCUTAneous Q8H    sodium chloride (NS) flush 5-40 mL  5-40 mL IntraVENous Q8H    sodium chloride (NS) flush 5-40 mL  5-40 mL IntraVENous PRN    acetaminophen (TYLENOL) tablet 650 mg  650 mg Oral Q6H PRN    Or    acetaminophen (TYLENOL) suppository 650 mg  650 mg Rectal Q6H PRN    polyethylene glycol (MIRALAX) packet 17 g  17 g Oral DAILY PRN    ondansetron (ZOFRAN ODT) tablet 4 mg  4 mg Oral Q8H PRN    Or    ondansetron (ZOFRAN) injection 4 mg  4 mg IntraVENous Q6H PRN    aspirin delayed-release tablet 81 mg  81 mg Oral DAILY    atorvastatin (LIPITOR) tablet 40 mg  40 mg Oral DAILY    hydrALAZINE (APRESOLINE) 20 mg/mL injection 20 mg  20 mg IntraVENous Q6H PRN    budesonide-formoteroL (SYMBICORT) 160-4.5 mcg/actuation HFA inhaler 2 Puff  2 Puff Inhalation BID RT    influenza vaccine 2021-22 (6 mos+)(PF) (FLUARIX/FLULAVAL/FLUZONE QUAD) injection 0.5 mL  1 Each IntraMUSCular PRIOR TO DISCHARGE    piperacillin-tazobactam (ZOSYN) 3.375 g in 0.9% sodium chloride (MBP/ADV) 100 mL MBP  3.375 g IntraVENous Q8H        Review of Systems:    Review of Systems   Constitutional: Negative for chills and fever. Respiratory: Negative for cough and shortness of breath. Gastrointestinal: Negative for heartburn and nausea. Genitourinary: Negative for dysuria and urgency. Neurological: Negative for dizziness and headaches. Objective:     Visit Vitals  BP (!) 111/49 (BP 1 Location: Right upper arm, BP Patient Position: At rest)   Pulse 77   Temp 97.5 °F (36.4 °C)   Resp 18   Ht 6' (1.829 m)   Wt 113.4 kg (250 lb)   SpO2 98%   BMI 33.91 kg/m²    O2 Flow Rate (L/min): 2 l/min O2 Device: Nasal cannula    Temp (24hrs), Av.6 °F (36.4 °C), Min:97.5 °F (36.4 °C), Max:97.8 °F (36.6 °C)      No intake/output data recorded. 10/26 0701 - 10/27 1900  In: 350 [P.O.:250; I.V.:100]  Out: 1615 [Urine:1615]    PHYSICAL EXAM:    Physical Exam      General: He is not in acute distress.     Appearance: He is obese. HENT:      Mouth/Throat:      Dentition: Abnormal dentition. Dental caries present. Cardiovascular:      Rate and Rhythm: Normal rate and regular rhythm.      Pulses: Normal pulses.      Heart sounds: Normal heart sounds. Pulmonary:      Effort: Pulmonary effort is normal.      Breath sounds: Normal breath sounds. Abdominal:      General: Bowel sounds are normal.      Palpations: Abdomen is soft. Genitourinary:     Comments: voiding  clear yellow urine.   Skin:     General: Skin is warm and dry.      Comments: Bilateral feet with dressings, sacrum with redness  Neurological:      Mental Status: He is oriented to person, place, and time.     Data Review    Recent Results (from the past 24 hour(s))   CBC W/O DIFF    Collection Time: 10/27/21 11:49 AM   Result Value Ref Range    WBC 17.4 (H) 4.1 - 11.1 K/uL    RBC 2.65 (L) 4.10 - 5.70 M/uL    HGB 7.6 (L) 12.1 - 17.0 g/dL    HCT 25.6 (L) 36.6 - 50.3 %    MCV 96.6 80.0 - 99.0 FL    MCH 28.7 26.0 - 34.0 PG    MCHC 29.7 (L) 30.0 - 36.5 g/dL    RDW 15.1 (H) 11.5 - 14.5 %    PLATELET 974 438 - 272 K/uL    MPV 11.0 8.9 - 12.9 FL    NRBC 0.2 (H) 0.0  WBC    ABSOLUTE NRBC 0.03 (H) 0.00 - 0.01 K/uL   RENAL FUNCTION PANEL    Collection Time: 10/27/21 11:49 AM   Result Value Ref Range    Sodium 146 (H) 136 - 145 mmol/L    Potassium 3.8 3.5 - 5.1 mmol/L    Chloride 107 97 - 108 mmol/L    CO2 31 21 - 32 mmol/L    Anion gap 8 5 - 15 mmol/L    Glucose 122 (H) 65 - 100 mg/dL     (H) 6 - 20 mg/dL    Creatinine 3.45 (H) 0.70 - 1.30 mg/dL    BUN/Creatinine ratio 41 (H) 12 - 20      GFR est AA 21 (L) >60 ml/min/1.73m2    GFR est non-AA 17 (L) >60 ml/min/1.73m2    Calcium 8.4 (L) 8.5 - 10.1 mg/dL    Phosphorus 4.6 2.6 - 4.7 mg/dL    Albumin 2.0 (L) 3.5 - 5.0 g/dL       LOWER EXT ART PVR MULT LEVEL SEG PRESSURES   Final Result      DUPLEX LOWER EXT VENOUS BILAT   Final Result      US RETROPERITONEUM COMP   Final Result   1. Medical renal disease. No hydronephrosis. 2.  Numerous large bilateral renal cysts. 3.  Urinary bladder postvoid residual of 103 mL. XR CHEST PORT   Final Result   Worsening pulmonary edema, suggesting CHF or volume overload. XR CHEST PORT   Final Result      XR FOOT LT AP/LAT   Final Result   No radiographic evidence of osteomyelitis. XR FOOT RT AP/LAT   Final Result   Nonspecific nonfocal osteopenia. Active Problems:    Multiple open wounds of foot (10/21/2021)      Bilateral leg edema (10/22/2021)      Acute kidney injury ([address] (10/23/2021)        Assessment/Plan:   1. Bilateral foot ulcers- secondary to chronic venous stasis, ID following,  Cultures w/ multiorganisms, IV zosyn until 11/1 , midline inserted today. Lower leg venous duplex done, no DVTs, vascular recommending compression wraps for both legs. Wound care ordered, aquacel Ag to both feet.      2.  Acute on chronic kidney disease- nephrology following, creatinine  3.45     3.  CHF- diastolic dysfunction, EF 76-05%, reduced systolic function, aortic valve regurgitation, with RVAP at 11. Switch to PO lasix bid, cardiology following,      4. Generalized weakness- OT/PT , OOB to chair as tolerated     5. Atrial flutter- paroxsymal , on cardizem, cardiology following,   Continue telemetry,     6. Anemia- stop heparin sq, check stool for occult blood  Repeat cbc in am.      7.  Discharge-  SNF acceptance,hold until HGB more stable,             DVT Prophylaxis: sequential compression  Code Status:  Full Code  POA: caregiver Sara Marsh  [address] discussed with:   ________patient, staff nurse, ATTILA, cardiology_______________________________________________________    Shania Soto NP

## 2021-10-27 NOTE — PROGRESS NOTES
Renal Progress Note    Patient: Scotty Lea MRN: [id number]  SSN: [id number]    YOB: 1940  Age: [de-identified] y.o. Sex: male      Admit Date: 10/21/2021    LOS: 5 days     Subjective:   Patient seen at bedside. Alert and awake, no acute distress.    No SOB,   Creat down to 3.45 today    Current Facility-Administered Medications   Medication Dose Route Frequency    furosemide (LASIX) tablet 40 mg  40 mg Oral BID    metoprolol succinate (TOPROL-XL) XL tablet 50 mg  50 mg Oral DAILY    dilTIAZem IR (CARDIZEM) tablet 30 mg  30 mg Oral TIDAC    ammonium lactate (AMLACTIN) 12 % cream   Topical DAILY    zinc oxide-white petrolatum 17-57 % topical paste   Topical TID    heparin (porcine) injection 5,000 Units  5,000 Units SubCUTAneous Q8H    sodium chloride (NS) flush 5-40 mL  5-40 mL IntraVENous Q8H    sodium chloride (NS) flush 5-40 mL  5-40 mL IntraVENous PRN    acetaminophen (TYLENOL) tablet 650 mg  650 mg Oral Q6H PRN    Or    acetaminophen (TYLENOL) suppository 650 mg  650 mg Rectal Q6H PRN    polyethylene glycol (MIRALAX) packet 17 g  17 g Oral DAILY PRN    ondansetron (ZOFRAN ODT) tablet 4 mg  4 mg Oral Q8H PRN    Or    ondansetron (ZOFRAN) injection 4 mg  4 mg IntraVENous Q6H PRN    aspirin delayed-release tablet 81 mg  81 mg Oral DAILY    atorvastatin (LIPITOR) tablet 40 mg  40 mg Oral DAILY    hydrALAZINE (APRESOLINE) 20 mg/mL injection 20 mg  20 mg IntraVENous Q6H PRN    budesonide-formoteroL (SYMBICORT) 160-4.5 mcg/actuation HFA inhaler 2 Puff  2 Puff Inhalation BID RT    influenza vaccine 2021-22 (6 mos+)(PF) (FLUARIX/FLULAVAL/FLUZONE QUAD) injection 0.5 mL  1 Each IntraMUSCular PRIOR TO DISCHARGE    piperacillin-tazobactam (ZOSYN) 3.375 g in 0.9% sodium chloride (MBP/ADV) 100 mL MBP  3.375 g IntraVENous Q8H        Vitals:    10/27/21 0800 10/27/21 0845 10/27/21 1200 10/27/21 1514   BP: (!) 108/52   (!) 111/49   Pulse: 66  68 (!) 53   Resp: 18   18   Temp: 97.8 °F (36.6 °C)   97.5 °F (36.4 °C)   SpO2: 94% 94%  98%   Weight:       Height:         Objective:   General: alert awake well-oriented, no acute distress. HEENT: EOMI, no Icterus, no Pallor,  mucosa moist.  Neck: Neck is supple, No JVD  Lungs: breathsounds normal,  no rhonchi, no rales,   CVS: heart sounds normal,  no murmurs, no rubs. GI: soft, nontender, normal BS. Extremeties: no cyanosis, 1+  edema,   Neuro: Alert, awake, oriented x3, No new focal deficits, moving all extremeties well. Skin: normal skin turgor, no skin rashes. Intake and Output:  Current Shift: No intake/output data recorded. Last three shifts: 10/25 1901 - 10/27 0700  In: 650 [P.O.:350; I.V.:300]  Out: 0683 [Urine:2605]      Lab/Data Review:  Recent Labs     10/27/21  1149 10/26/21  0745 10/25/21  0502   WBC 17.4* 14.3* 11.6*   HGB 7.6* 9.5* 9.5*   HCT 25.6* 32.1* 32.5*    253 233     Recent Labs     10/27/21  1149 10/26/21  0745 10/25/21  0502   * 145 145   K 3.8 4.0 4.1    109* 111*   CO2 31 30 29   * 107* 105*   * 130* 88*   CREA 3.45* 3.47* 3.52*   CA 8.4* 9.0  8.8 8.6   PHOS 4.6 3.8 3.3   ALB 2.0* 2.5* 2.3*     No results for input(s): PH, PCO2, PO2, HCO3, FIO2 in the last 72 hours.   Recent Results (from the past 24 hour(s))   CBC W/O DIFF    Collection Time: 10/27/21 11:49 AM   Result Value Ref Range    WBC 17.4 (H) 4.1 - 11.1 K/uL    RBC 2.65 (L) 4.10 - 5.70 M/uL    HGB 7.6 (L) 12.1 - 17.0 g/dL    HCT 25.6 (L) 36.6 - 50.3 %    MCV 96.6 80.0 - 99.0 FL    MCH 28.7 26.0 - 34.0 PG    MCHC 29.7 (L) 30.0 - 36.5 g/dL    RDW 15.1 (H) 11.5 - 14.5 %    PLATELET 308 338 - 694 K/uL    MPV 11.0 8.9 - 12.9 FL    NRBC 0.2 (H) 0.0  WBC    ABSOLUTE NRBC 0.03 (H) 0.00 - 0.01 K/uL   RENAL FUNCTION PANEL    Collection Time: 10/27/21 11:49 AM   Result Value Ref Range    Sodium 146 (H) 136 - 145 mmol/L    Potassium 3.8 3.5 - 5.1 mmol/L    Chloride 107 97 - 108 mmol/L    CO2 31 21 - 32 mmol/L    Anion gap 8 5 - 15 mmol/L Glucose 122 (H) 65 - 100 mg/dL     (H) 6 - 20 mg/dL    Creatinine 3.45 (H) 0.70 - 1.30 mg/dL    BUN/Creatinine ratio 41 (H) 12 - 20      GFR est AA 21 (L) >60 ml/min/1.73m2    GFR est non-AA 17 (L) >60 ml/min/1.73m2    Calcium 8.4 (L) 8.5 - 10.1 mg/dL    Phosphorus 4.6 2.6 - 4.7 mg/dL    Albumin 2.0 (L) 3.5 - 5.0 g/dL        Assessment and Plan:     1 acute kidney injury on underlying chronic kidney disease stage unknown.    -Etiology is prerenal azotemia from cardiorenal.    -On admission BUN/creatinine 47/2.8 and has gone up to 71/3.8.  ,   creat down to 3.52--3.47--3.45 today  Continue diuretics for fluid overload/edema  We will continue to monitor renal functions    2. Chronic kidney disease, probably has a stage IV chronic kidney disease as baseline  Renal ultrasound showed larger kidneys with multiple bilateral cysts suggestive of polycystic kidney disease. significant proteinuria+, will avoid ace-I for now for risk of MAURILIO   intact PTH is 122 and vitamin D level is nomal  Add cacitriol 0.25mcg daily  will continue to monitor renal functions to assess the baseline    2. Hypotension. Improved hemodynamic status now  Amlodipine was discontinued  Continue to monitor blood pressures     3. CHF. -He is admitted with decompensated CHF.    -2D echocardiogram has been ordered.    -On admission he was with hypoxic respiratory failure and lower extremity edema.    -Improved clinically with the diuresis, continue current Lasix and continue to monitor inputs and outputs and renal functions     4.  Bilateral lower extremity wounds.    -On IV antibiotics as per ID recommendations.   Vascular surgery evaluating for vascular insufficiency    Awaiting discharge possibly today [facility]  Signed By: Marciano Mendoza MD     October 27, 2021

## 2021-10-27 NOTE — PROGRESS NOTES
PROGRESS NOTE      Chief Complaints:  Patient has no new complaints. HPI and  Objective:    Patient denies any fever chills, generalized weakness, denies abdominal pain nausea denies any chest pain shortness of breath. Review of Systems:  Rest of review of system negative compression reviewed. EXAM:  Visit Vitals  BP (!) 99/50 (BP 1 Location: Right upper arm, BP Patient Position: At rest)   Pulse 60   Temp 97.5 °F (36.4 °C)   Resp 18   Ht 6' (1.829 m)   Wt 250 lb (113.4 kg)   SpO2 99%   BMI 33.91 kg/m²       Patient is awake   Head and neck atraumatic, normocephalic. ENT: No hoarse voice  Cardiac system regular rate rhythm. Pulmonary no audible wheeze  Chest wall excursion normal with respiration cycle  Abdomen is soft not particularly distended. Neurologically nonfocal.  Skin is warm and moist.  Psychosocial: Cooperative. Vascular examination as previously noted no changes.     Recent Results (from the past 24 hour(s))   RENAL FUNCTION PANEL    Collection Time: 10/26/21  7:45 AM   Result Value Ref Range    Sodium 145 136 - 145 mmol/L    Potassium 4.0 3.5 - 5.1 mmol/L    Chloride 109 (H) 97 - 108 mmol/L    CO2 30 21 - 32 mmol/L    Anion gap 6 5 - 15 mmol/L    Glucose 107 (H) 65 - 100 mg/dL     (H) 6 - 20 mg/dL    Creatinine 3.47 (H) 0.70 - 1.30 mg/dL    BUN/Creatinine ratio 37 (H) 12 - 20      GFR est AA 21 (L) >60 ml/min/1.73m2    GFR est non-AA 17 (L) >60 ml/min/1.73m2    Calcium 8.8 8.5 - 10.1 mg/dL    Phosphorus 3.8 2.6 - 4.7 mg/dL    Albumin 2.5 (L) 3.5 - 5.0 g/dL   CBC W/O DIFF    Collection Time: 10/26/21  7:45 AM   Result Value Ref Range    WBC 14.3 (H) 4.1 - 11.1 K/uL    RBC 3.40 (L) 4.10 - 5.70 M/uL    HGB 9.5 (L) 12.1 - 17.0 g/dL    HCT 32.1 (L) 36.6 - 50.3 %    MCV 94.4 80.0 - 99.0 FL    MCH 27.9 26.0 - 34.0 PG    MCHC 29.6 (L) 30.0 - 36.5 g/dL    RDW 15.0 (H) 11.5 - 14.5 %    PLATELET 978 482 - 666 K/uL    MPV 10.9 8.9 - 12.9 FL    NRBC 0.1 (H) 0.0  WBC    ABSOLUTE NRBC 0.02 (H) 0.00 - 0.01 K/uL   NT-PRO BNP    Collection Time: 10/26/21  7:45 AM   Result Value Ref Range    NT pro-BNP >35,000 (H) <450 pg/mL   PTH INTACT    Collection Time: 10/26/21  7:45 AM   Result Value Ref Range    Calcium 9.0 8.5 - 10.1 mg/dL    PTH, Intact 122.4 (H) 18.4 - 88.0 pg/mL   VITAMIN D, 25 HYDROXY    Collection Time: 10/26/21  7:45 AM   Result Value Ref Range    Vitamin D 25-Hydroxy 33.2 30 - 100 ng/mL   IRON PROFILE    Collection Time: 10/26/21  7:45 AM   Result Value Ref Range    Iron 71 35 - 150 ug/dL    TIBC 206 (L) 250 - 450 ug/dL    Iron % saturation 34 20 - 50 %   FERRITIN    Collection Time: 10/26/21  7:45 AM   Result Value Ref Range    Ferritin 105 26 - 388 ng/mL   LOWER EXT ART PVR MULT LEVEL SEG PRESSURES    Collection Time: 10/26/21 11:24 AM   Result Value Ref Range    Right dorsalis pedis  mmHg    Left dorsalis pedis  mmHg    Left arm  mmHg    Right arm  mmHg    Left posterior tibial 184 mmHg    Right posterior tibial 144 mmHg    Left RIOS 1.20     Right RIOS 1.01    ECHO ADULT COMPLETE    Collection Time: 10/26/21 12:30 PM   Result Value Ref Range    LV ED Vol A2C 87.00 cm3    LV ED Vol A4C 40.00 cm3    LV ES Vol A4C 58.00 cm3    LV ES Vol A2C 68.40 cm3    IVSd 1.26 (A) 0.60 - 1.00 cm    LVIDd 5.47 4.20 - 5.90 cm    LVIDs 4.09 cm    LVOT d 2.60 cm    Left Ventricular Outflow Tract Mean Gradient 3.00 mmHg    LVOT VTI 26.70 cm    LVOT Peak Velocity 116.00 cm/s    LVOT Peak Gradient 5.00 mmHg    LVPWd 0.88 0.60 - 1.00 cm    LV E' Septal Velocity 4.78 cm/s    E/E' septal 11.21     LVOT .0 cm3    Aortic Regurgitant Pressure Half-time 641.00 ms    AR Max Nadir 416.00 cm/s    Aortic Valve Systolic Peak Velocity 273.78 cm/s    AoV PG 43.00 mmHg    Aortic Valve Systolic Mean Gradient 08.04 mmHg    AoV VTI 77.00 cm    Aortic valve mean velocity 215.00 cm/s    Aortic Valve Area by Continuity of VTI 1.84 cm2    Aortic Valve Area by Continuity of Peak Velocity 1.88 cm2    Ao Root D 3.30 cm    AO ASC D 3.80 cm    Left Atrium Major Axis 4.50 cm    Mitral Valve Max Velocity 95.00 cm/s    MV Peak Gradient 4.00 mmHg    Mitral Valve Deceleration [place] 1,700.00 mm/s2    Mitral Valve Deceleration [place] 2,030.00 mm/s2    Mitral Valve Deceleration [place] 1,870.00 mm/s2    Mitral Valve Pressure Half-time 83.00 ms    MV A Nadir 87.60 cm/s    MV E Nadir 53.60 cm/s    MV Mean Gradient 1.00 mmHg    Mitral Valve Annulus Velocity Time Integral 38.40 cm    MVA (PHT) 2.65 cm2    MV E/A 0.61     Pulmonic Valve Systolic Mean Gradient 4.83 mmHg    Pulmonic Valve Systolic Velocity Time Integral 25.20 cm    Pulmonic Valve Max Velocity 156.00 cm/s    Pulmonic Valve Systolic Peak Instantaneous Gradient 10.00 mmHg    Tricuspid Valve Max Velocity 142.00 cm/s    Triscuspid Valve Regurgitation Peak Gradient 8.00 mmHg    LV Mass .1 88.0 - 224.0 g    LV Mass AL Index 98.8 49.0 - 115.0 g/m2    Left Atrium Minor Axis 1.92 cm    LUCIANA/BSA Pk Nadir 0.8 cm2/m2    LUCIANA/BSA VTI 0.8 cm2/m2       ASSESSMENT:   Patient is [de-identified] y.o. with diagnosis of : Active Problems:    Multiple open wounds of foot (10/21/2021)      Bilateral leg edema (10/22/2021)      Acute kidney injury ([address] (10/23/2021)        PLAN:                 Patient going to rehab today. I will instruct the nursing staff to apply modified compression wraps both legs. Left pretibial wound is to be covered with a Xeroform gauze. And modified compression wrap needs to be changed every other day. Modified compression wrap consisted of Kerlix roll from the foot to the below-knee level and the Ace wraps and please make appointment follow-up Dr. Daya Shelton in 2 weeks.

## 2021-10-27 NOTE — PROGRESS NOTES
Infectious Diseases Progress Note Jacob Barfield MD 
[phone] Date:10/27/2021       Room:[facility] Lam Long     YOB: 1940     Age:81 y.o. 81M with history of smoking, hypertension, dyslipidemia, repaired AAA, obesity. Chronic venous stasis of the lower extremities with recurrent inflammation. 
  
2018 I saw him at [facility] for complications of venous stasis dermatitis. At that hospital visit right leg wound cultures revealed MSSA and PSEUDOMONAS, managed with a two weeks course of levofloxacin and doxycycline at that time. 
  
10/21/21 presents to hospital with a complaint of bilateral foot wound checks and lower extremity edema. Associated with bilateral lower extremity pain. EMS found him hypoxic and managed with supplemental oxygen. During the ED course chest x-ray showing pulmonary vascular congestion without overt pulmonary edema as well as left basilar airspace disease most likely atelectasis. Started on IV diuresis. Admitted to hospital and podiatry and wound care have been asked to see him for his condition. 
  
Wound cultures from the right and left foot suggestive of multiple organisms and I have been asked to see him in consultation. Subjective Subjective: 
Symptoms:  Stable. No shortness of breath or chest pain. Review of Systems Constitutional: Negative for fever. Respiratory: Negative for shortness of breath. Cardiovascular: Negative for chest pain. All other systems reviewed and are negative. Objective Vitals Last 24 Hours: TEMPERATURE:  Temp  Av.7 °F (36.5 °C)  Min: 97.5 °F (36.4 °C)  Max: 97.8 °F (36.6 °C) RESPIRATIONS RANGE: Resp  Av  Min: 18  Max: 18 PULSE OXIMETRY RANGE: SpO2  Av.8 %  Min: 94 %  Max: 99 % PULSE RANGE: Pulse  Av.6  Min: 52  Max: 80 BLOOD PRESSURE RANGE: Systolic (49VIX), PLO:311 , Min:99 , LRU:914  
; Diastolic (97TKE), PFM:48, Min:50, Max:66 I/O (24Hr):  
 
Intake/Output Summary (Last 24 hours) at 10/27/2021 1208 Last data filed at 10/27/2021 8445 Gross per 24 hour Intake 350 ml Output 1440 ml Net -1090 ml Objective: 
General Appearance:  Comfortable. Vital signs: (most recent): Blood pressure (!) 108/52, pulse (!) 54, temperature 97.8 °F (36.6 °C), resp. rate 18, height 6' (1.829 m), weight 113.4 kg (250 lb), SpO2 94 %. Vital signs are normal.  No fever. Constitutional: No acute distress Skin: Extremities and face reveal no rashes, lower feet wrapped in a dressing which are clean, dry, intact, chronic lymphedema noted in legs bilaterally HEENT: Sclerae anicteric. Extra-occular muscles are intact. No oral ulcers. The neck is supple and no masses. Cardiovascular: RRR Respiratory:  Clear breath sounds bilaterally with no wheezes, rales, or rhonchi. GI: Abdomen nondistended, soft, and nontender. Normal active bowel sounds. Musculoskeletal: No pitting edema of the lower legs. Able to move all ext Neurological:  Patient is alert and oriented. Cranial nerves II-XII grossly intact Psychiatric: Mood appears appropriate Labs/Imaging/Diagnostics Labs: CBC: 
Recent Labs 10/26/21 
0745 10/25/21 
0502 WBC 14.3* 11.6*  
RBC 3.40* 3.41* HGB 9.5* 9.5* HCT 32.1* 32.5* MCV 94.4 95.3 RDW 15.0* 14.9*  
 233 CHEMISTRIES: 
Recent Labs 10/26/21 
0745 10/25/21 
0502  145  
K 4.0 4.1 * 111* CO2 30 29 * 88*  
CA 9.0  8.8 8.6 PHOS 3.8 3.3 PT/INR:No results for input(s): INR, INREXT, INREXT in the last 72 hours. No lab exists for component: PROTIME APTT:No results for input(s): APTT in the last 72 hours. LIVER PROFILE:No results for input(s): AST, ALT in the last 72 hours. No lab exists for component: BILIDIR, BILITOT, ALKPHOS Lab Results Component Value Date/Time ALT (SGPT) 24 10/21/2021 09:20 AM  
 AST (SGOT) 37 10/21/2021 09:20 AM  
 Alk.  phosphatase 119 (H) 10/21/2021 09:20 AM  
 Bilirubin, total 0.6 10/21/2021 09:20 AM  
 
 
Imaging Last 24 Hours: 
ECHO ADULT COMPLETE Result Date: 10/26/2021 · LV: Estimated LVEF is 45 - 50%. Visually measured ejection fraction. Normal cavity size. Mild concentric hypertrophy. Segmentally reduced systolic function. Mild (grade 1) left ventricular diastolic dysfunction. · TV: Right Ventricular Arterial Pressure (RVSP) is 11 mmHg. · AV: Moderate aortic valve leaflet calcification present with reduced excursion. Moderate aortic valve stenosis is present (Aortic Valve Peak Gradient 43 mm Hg, Mean Gradient 24 mm Hg, LUCIANA 1.4 cm2, DI: 0.35). Mild to moderate aortic valve regurgitation is present. Assessment//Plan Active Problems: 
  Multiple open wounds of foot (10/21/2021) Bilateral leg edema (10/22/2021) Acute kidney injury ([facility] [address] (10/23/2021) Assessment & Plan 81M with history of smoking, hypertension, dyslipidemia, repaired AAA, obesity. Chronic venous stasis of the lower extremities with recurrent inflammation. 
  
July 2018 I saw him at [facility] for complications of venous stasis dermatitis. At that hospital visit right leg wound cultures revealed MSSA and PSEUDOMONAS, managed with a two weeks course of levofloxacin and doxycycline at that time. 
  
10/21/21 presents to hospital with a complaint of bilateral foot wound checks and lower extremity edema. Associated with bilateral lower extremity pain. EMS found him hypoxic and managed with supplemental oxygen. During the ED course chest x-ray showing pulmonary vascular congestion without overt pulmonary edema as well as left basilar airspace disease most likely atelectasis. Started on IV diuresis. Admitted to hospital and podiatry and wound care have been asked to see him for his condition. 
  
Wound cultures from the right and left foot suggestive of multiple organisms and I have been asked to see him in consultation. 
  
MICROBIOLOGY 
  
7/28/18            R leg    MSSA Pseudomonas 
  
10/21/21          Blood   Negative 
  
10/21/21          R foot   Heavy Gram Negative Rods MULTIPLE COLONY TYPES 
                                    checking for possible BETA STREPTOCOCCUS 
  
10/21/21          L foot   Heavy Streptococci, beta hemolyticgroup C Myroides species Proteus vulgaris Morganella morganii ssp morganii 
  
ASSESSMENT AND RECOMMENDATIONS 
  
1) Chronic venous stasis of the bilateral lower extremities further complicated by complex soft tissue infection. 
  
 Zosyn iv per pharmacy dosing through 11/1/21 Anticipate transfer to skilled nursing facility             Wound care inpatient and outpatient 
  
  
Electronically signed by Tanya Bowman MD on 10/27/2021 at 12:54 PM

## 2021-10-27 NOTE — PROGRESS NOTES
Problem: Falls - Risk of  Goal: *Absence of Falls  Description: Document Brandy Cifuentes Fall Risk and appropriate interventions in the flowsheet. Outcome: Progressing Towards Goal  Note: Fall Risk Interventions:  Mobility Interventions: Bed/chair exit alarm, Patient to call before getting OOB, Communicate number of staff needed for ambulation/transfer         Medication Interventions: Bed/chair exit alarm, Patient to call before getting OOB, Teach patient to arise slowly    Elimination Interventions: Call light in reach, Bed/chair exit alarm, Patient to call for help with toileting needs, Toilet paper/wipes in reach, Toileting schedule/hourly rounds, Urinal in reach              Problem: Patient Education: Go to Patient Education Activity  Goal: Patient/Family Education  Outcome: Progressing Towards Goal     Problem: Pressure Injury - Risk of  Goal: *Prevention of pressure injury  Description: Document Socrates Scale and appropriate interventions in the flowsheet. Outcome: Progressing Towards Goal  Note: Pressure Injury Interventions:  Sensory Interventions: Float heels, Keep linens dry and wrinkle-free, Maintain/enhance activity level, Minimize linen layers, Pad between skin to skin, Turn and reposition approx. every two hours (pillows and wedges if needed)    Moisture Interventions: Apply protective barrier, creams and emollients, Absorbent underpads, Check for incontinence Q2 hours and as needed, Minimize layers    Activity Interventions: PT/OT evaluation, Increase time out of bed, Pressure redistribution bed/mattress(bed type)    Mobility Interventions: Float heels, HOB 30 degrees or less, Pressure redistribution bed/mattress (bed type), Turn and reposition approx.  every two hours(pillow and wedges)    Nutrition Interventions: Document food/fluid/supplement intake, Offer support with meals,snacks and hydration    Friction and Shear Interventions: Apply protective barrier, creams and emollients, HOB 30 degrees or less, Minimize layers                Problem: Patient Education: Go to Patient Education Activity  Goal: Patient/Family Education  Outcome: Progressing Towards Goal     Problem: Patient Education: Go to Patient Education Activity  Goal: Patient/Family Education  Outcome: Progressing Towards Goal     Problem: Patient Education: Go to Patient Education Activity  Goal: Patient/Family Education  Outcome: Progressing Towards Goal

## 2021-10-27 NOTE — PROGRESS NOTES
Cardiology Progress Note      10/27/2021 11:55 AM    Admit Date: 10/21/2021    Admit Diagnosis: Multiple open wounds of foot [S91.309A]  Bilateral leg edema [R60.0]      Subjective:     Patient seen and examined; he has no new complaints. No significant overnight events.     Visit Vitals  BP (!) 108/52 (BP 1 Location: Right upper arm, BP Patient Position: At rest)   Pulse 68   Temp 97.8 °F (36.6 °C)   Resp 18   Ht 6' (1.829 m)   Wt 113.4 kg (250 lb)   SpO2 94%   BMI 33.91 kg/m²     Current Facility-Administered Medications   Medication Dose Route Frequency    furosemide (LASIX) tablet 40 mg  40 mg Oral BID    metoprolol succinate (TOPROL-XL) XL tablet 50 mg  50 mg Oral DAILY    dilTIAZem IR (CARDIZEM) tablet 30 mg  30 mg Oral TIDAC    ammonium lactate (AMLACTIN) 12 % cream   Topical DAILY    zinc oxide-white petrolatum 17-57 % topical paste   Topical TID    heparin (porcine) injection 5,000 Units  5,000 Units SubCUTAneous Q8H    sodium chloride (NS) flush 5-40 mL  5-40 mL IntraVENous Q8H    sodium chloride (NS) flush 5-40 mL  5-40 mL IntraVENous PRN    acetaminophen (TYLENOL) tablet 650 mg  650 mg Oral Q6H PRN    Or    acetaminophen (TYLENOL) suppository 650 mg  650 mg Rectal Q6H PRN    polyethylene glycol (MIRALAX) packet 17 g  17 g Oral DAILY PRN    ondansetron (ZOFRAN ODT) tablet 4 mg  4 mg Oral Q8H PRN    Or    ondansetron (ZOFRAN) injection 4 mg  4 mg IntraVENous Q6H PRN    aspirin delayed-release tablet 81 mg  81 mg Oral DAILY    atorvastatin (LIPITOR) tablet 40 mg  40 mg Oral DAILY    hydrALAZINE (APRESOLINE) 20 mg/mL injection 20 mg  20 mg IntraVENous Q6H PRN    budesonide-formoteroL (SYMBICORT) 160-4.5 mcg/actuation HFA inhaler 2 Puff  2 Puff Inhalation BID RT    influenza vaccine 2021-22 (6 mos+)(PF) (FLUARIX/FLULAVAL/FLUZONE QUAD) injection 0.5 mL  1 Each IntraMUSCular PRIOR TO DISCHARGE    piperacillin-tazobactam (ZOSYN) 3.375 g in 0.9% sodium chloride (MBP/ADV) 100 mL MBP  3.375 g IntraVENous Q8H         Objective:      Physical Exam:  Visit Vitals  BP (!) 108/52 (BP 1 Location: Right upper arm, BP Patient Position: At rest)   Pulse 68   Temp 97.8 °F (36.6 °C)   Resp 18   Ht 6' (1.829 m)   Wt 113.4 kg (250 lb)   SpO2 94%   BMI 33.91 kg/m²     General Appearance:  Well developed, well nourished,alert and oriented x 3, and individual in no acute distress. Ears/Nose/Mouth/Throat:   Hearing grossly normal.         Neck: Supple. Chest:   Lungs clear to auscultation bilaterally. Cardiovascular:  Regular rate and rhythm, S1, S2 normal, no murmur. Abdomen:   Soft, non-tender, bowel sounds are active. Extremities: Chronic edema bilaterally. Skin: Warm and dry. Data Review:   Labs:    Recent Results (from the past 24 hour(s))   CBC W/O DIFF    Collection Time: 10/27/21 11:49 AM   Result Value Ref Range    WBC 17.4 (H) 4.1 - 11.1 K/uL    RBC 2.65 (L) 4.10 - 5.70 M/uL    HGB 7.6 (L) 12.1 - 17.0 g/dL    HCT 25.6 (L) 36.6 - 50.3 %    MCV 96.6 80.0 - 99.0 FL    MCH 28.7 26.0 - 34.0 PG    MCHC 29.7 (L) 30.0 - 36.5 g/dL    RDW 15.1 (H) 11.5 - 14.5 %    PLATELET 705 514 - 482 K/uL    MPV 11.0 8.9 - 12.9 FL    NRBC 0.2 (H) 0.0  WBC    ABSOLUTE NRBC 0.03 (H) 0.00 - 0.01 K/uL   RENAL FUNCTION PANEL    Collection Time: 10/27/21 11:49 AM   Result Value Ref Range    Sodium 146 (H) 136 - 145 mmol/L    Potassium 3.8 3.5 - 5.1 mmol/L    Chloride 107 97 - 108 mmol/L    CO2 31 21 - 32 mmol/L    Anion gap 8 5 - 15 mmol/L    Glucose 122 (H) 65 - 100 mg/dL     (H) 6 - 20 mg/dL    Creatinine 3.45 (H) 0.70 - 1.30 mg/dL    BUN/Creatinine ratio 41 (H) 12 - 20      GFR est AA 21 (L) >60 ml/min/1.73m2    GFR est non-AA 17 (L) >60 ml/min/1.73m2    Calcium 8.4 (L) 8.5 - 10.1 mg/dL    Phosphorus 4.6 2.6 - 4.7 mg/dL    Albumin 2.0 (L) 3.5 - 5.0 g/dL       Assessment:   Paroxysmal Atrial Flutter?   Acute CHF unknown type  Acute on CKD  Chronic Venous Ulcers  Hypertension  Hyperlipidemia Plan:   -Echo findings reviewed with patient. Agree with oral diuresis.  -He needs regular echocardiographic surveillance for his aortic stenosis. -Per patient he has had atrial fibrillation in the past but he not sure about having been on anticoagulation in the past. His PYLUR4CZWN score is 4, HASBLED score is 3; he has a reoughly comparable stroke as well as bleeding risk. Nonetheless, I think he will benefit from long term anticoagulation.  Given his low hemoglobin level and elevated bleeding risk, will hold off on anticoagulation for now and will follow hemoglobin as an outpatient, decide on anticoagulation in the future.  -Wall motion abnormalities noted on echo; he has no anginal symptoms; will closely follow and plan for stress testing in the future as an outpatient  -We will follow    Thank you for allowing us to participate in the care of your patient

## 2021-10-28 NOTE — PROGRESS NOTES
PROGRESS NOTE      Chief Complaints:  Patient has no new complaints. HPI and  Objective:    Last time patient was agitated. Apparently also declined compression wraps yesterday. So compression wrap was applied on both legs. Patient has no fever chills. Denies chest pain shortness breath or abdominal pain nausea or general weakness. Review of Systems:  Rest of review of system negative. Personally reviewed. EXAM:  Visit Vitals  BP (!) 124/48 (BP 1 Location: Right upper arm, BP Patient Position: At rest)   Pulse (!) 56   Temp 97.8 °F (36.6 °C)   Resp 18   Ht 6' (1.829 m)   Wt 250 lb (113.4 kg)   SpO2 95%   BMI 33.91 kg/m²       Patient is awake   Head and neck atraumatic, normocephalic. ENT: No hoarse voice  Cardiac system regular rate rhythm. Pulmonary no audible wheeze  Chest wall excursion normal with respiration cycle  Abdomen is soft not particularly distended. Neurologically nonfocal.  Skin is warm and moist.  Psychosocial: Cooperative. Vascular examination as previously noted no changes.     Recent Results (from the past 24 hour(s))   CBC W/O DIFF    Collection Time: 10/27/21 11:49 AM   Result Value Ref Range    WBC 17.4 (H) 4.1 - 11.1 K/uL    RBC 2.65 (L) 4.10 - 5.70 M/uL    HGB 7.6 (L) 12.1 - 17.0 g/dL    HCT 25.6 (L) 36.6 - 50.3 %    MCV 96.6 80.0 - 99.0 FL    MCH 28.7 26.0 - 34.0 PG    MCHC 29.7 (L) 30.0 - 36.5 g/dL    RDW 15.1 (H) 11.5 - 14.5 %    PLATELET 085 913 - 440 K/uL    MPV 11.0 8.9 - 12.9 FL    NRBC 0.2 (H) 0.0  WBC    ABSOLUTE NRBC 0.03 (H) 0.00 - 0.01 K/uL   RENAL FUNCTION PANEL    Collection Time: 10/27/21 11:49 AM   Result Value Ref Range    Sodium 146 (H) 136 - 145 mmol/L    Potassium 3.8 3.5 - 5.1 mmol/L    Chloride 107 97 - 108 mmol/L    CO2 31 21 - 32 mmol/L    Anion gap 8 5 - 15 mmol/L    Glucose 122 (H) 65 - 100 mg/dL     (H) 6 - 20 mg/dL    Creatinine 3.45 (H) 0.70 - 1.30 mg/dL    BUN/Creatinine ratio 41 (H) 12 - 20      GFR est AA 21 (L) >60 ml/min/1.73m2 GFR est non-AA 17 (L) >60 ml/min/1.73m2    Calcium 8.4 (L) 8.5 - 10.1 mg/dL    Phosphorus 4.6 2.6 - 4.7 mg/dL    Albumin 2.0 (L) 3.5 - 5.0 g/dL       ASSESSMENT:   Patient is [de-identified] y.o. with diagnosis of : Active Problems:    Multiple open wounds of foot (10/21/2021)      Bilateral leg edema (10/22/2021)      Acute kidney injury ([address] (10/23/2021)        PLAN:                 I have informed and advised the patient about benefits of compression wraps both legs. Wound care instructions provided in the epic system. Patient is agreeable for compression wraps today. I did talk to the nursing staff about this as well.

## 2021-10-28 NOTE — PROGRESS NOTES
Renal Progress Note    Patient: Eddie Esquivel MRN: [id number]  SSN: [id number]    YOB: 1940  Age: [de-identified] y.o. Sex: male      Admit Date: 10/21/2021    LOS: 6 days     Subjective:   Patient seen at bedside. Alert and awake, no acute distress.    No SOB,   Creat down to 3.0 today  Hb down to 6.1 and receiving pRBC Tx    Current Facility-Administered Medications   Medication Dose Route Frequency    0.9% sodium chloride infusion 250 mL  250 mL IntraVENous PRN    polyethylene glycol (MIRALAX) packet 17 g  17 g Oral DAILY    docusate sodium (COLACE) capsule 100 mg  100 mg Oral BID    furosemide (LASIX) tablet 40 mg  40 mg Oral BID    metoprolol succinate (TOPROL-XL) XL tablet 50 mg  50 mg Oral DAILY    dilTIAZem IR (CARDIZEM) tablet 30 mg  30 mg Oral TIDAC    ammonium lactate (AMLACTIN) 12 % cream   Topical DAILY    zinc oxide-white petrolatum 17-57 % topical paste   Topical TID    sodium chloride (NS) flush 5-40 mL  5-40 mL IntraVENous Q8H    sodium chloride (NS) flush 5-40 mL  5-40 mL IntraVENous PRN    acetaminophen (TYLENOL) tablet 650 mg  650 mg Oral Q6H PRN    Or    acetaminophen (TYLENOL) suppository 650 mg  650 mg Rectal Q6H PRN    ondansetron (ZOFRAN ODT) tablet 4 mg  4 mg Oral Q8H PRN    Or    ondansetron (ZOFRAN) injection 4 mg  4 mg IntraVENous Q6H PRN    aspirin delayed-release tablet 81 mg  81 mg Oral DAILY    atorvastatin (LIPITOR) tablet 40 mg  40 mg Oral DAILY    hydrALAZINE (APRESOLINE) 20 mg/mL injection 20 mg  20 mg IntraVENous Q6H PRN    budesonide-formoteroL (SYMBICORT) 160-4.5 mcg/actuation HFA inhaler 2 Puff  2 Puff Inhalation BID RT    influenza vaccine 2021-22 (6 mos+)(PF) (FLUARIX/FLULAVAL/FLUZONE QUAD) injection 0.5 mL  1 Each IntraMUSCular PRIOR TO DISCHARGE    piperacillin-tazobactam (ZOSYN) 3.375 g in 0.9% sodium chloride (MBP/ADV) 100 mL MBP  3.375 g IntraVENous Q8H        Vitals:    10/28/21 1006 10/28/21 1009 10/28/21 1045 10/28/21 1145   BP: (!) 115/44 (!) 130/45 (!) 124/48   Pulse: (!) 55  (!) 55 (!) 54   Resp: 18  18 18   Temp: 97.8 °F (36.6 °C)  97.4 °F (36.3 °C) 97.7 °F (36.5 °C)   SpO2: 94%  95% 94%   Weight:       Height:  6' (1.829 m)       Objective:   General: alert awake well-oriented, no acute distress. HEENT: EOMI, no Icterus, no Pallor,  mucosa moist.  Neck: Neck is supple, No JVD  Lungs: breathsounds normal,  no rhonchi, no rales,   CVS: heart sounds normal,  no murmurs, no rubs. GI: soft, nontender, normal BS. Extremeties: no cyanosis, 1+  edema,   Neuro: Alert, awake, oriented x3, No new focal deficits, moving all extremeties well. Skin: normal skin turgor, no skin rashes. Intake and Output:  Current Shift: 10/28 0701 - 10/28 1900  In: 228   Out: -   Last three shifts: 10/26 1901 - 10/28 0700  In: 350 [P.O.:250; I.V.:100]  Out: 1670 [Urine:1670]      Lab/Data Review:  Recent Labs     10/28/21  0708 10/27/21  1149 10/26/21  0745   WBC 14.3* 17.4* 14.3*   HGB 6.1* 7.6* 9.5*   HCT 21.2* 25.6* 32.1*    215 253     Recent Labs     10/28/21  0708 10/27/21  1149 10/26/21  0745   * 146* 145   K 3.3* 3.8 4.0   * 107 109*   CO2 27 31 30   GLU 95 122* 107*   * 143* 130*   CREA 3.03* 3.45* 3.47*   CA 7.2* 8.4* 9.0  8.8   PHOS 3.8 4.6 3.8   ALB 1.6* 2.0* 2.5*     No results for input(s): PH, PCO2, PO2, HCO3, FIO2 in the last 72 hours.   Recent Results (from the past 24 hour(s))   RENAL FUNCTION PANEL    Collection Time: 10/28/21  7:08 AM   Result Value Ref Range    Sodium 152 (H) 136 - 145 mmol/L    Potassium 3.3 (L) 3.5 - 5.1 mmol/L    Chloride 114 (H) 97 - 108 mmol/L    CO2 27 21 - 32 mmol/L    Anion gap 11 5 - 15 mmol/L    Glucose 95 65 - 100 mg/dL     (H) 6 - 20 mg/dL    Creatinine 3.03 (H) 0.70 - 1.30 mg/dL    BUN/Creatinine ratio 44 (H) 12 - 20      GFR est AA 24 (L) >60 ml/min/1.73m2    GFR est non-AA 20 (L) >60 ml/min/1.73m2    Calcium 7.2 (L) 8.5 - 10.1 mg/dL    Phosphorus 3.8 2.6 - 4.7 mg/dL    Albumin 1.6 (L) 3.5 - 5.0 g/dL   CBC W/O DIFF    Collection Time: 10/28/21  7:08 AM   Result Value Ref Range    WBC 14.3 (H) 4.1 - 11.1 K/uL    RBC 2.15 (L) 4.10 - 5.70 M/uL    HGB 6.1 (L) 12.1 - 17.0 g/dL    HCT 21.2 (L) 36.6 - 50.3 %    MCV 98.6 80.0 - 99.0 FL    MCH 28.4 26.0 - 34.0 PG    MCHC 28.8 (L) 30.0 - 36.5 g/dL    RDW 15.4 (H) 11.5 - 14.5 %    PLATELET 041 462 - 459 K/uL    MPV 11.0 8.9 - 12.9 FL    NRBC 0.3 (H) 0.0  WBC    ABSOLUTE NRBC 0.04 (H) 0.00 - 0.01 K/uL   TYPE & SCREEN    Collection Time: 10/28/21  7:08 AM   Result Value Ref Range    Crossmatch Expiration 10/31/2021,2359     ABO/Rh(D) B Positive     Antibody screen Negative     Unit number [id number]     Blood component type RC LR     Unit division 00     Status of unit Issued     [address] to transfuse     Crossmatch result Compatible         Assessment and Plan:     1 acute kidney injury on underlying chronic kidney disease stage unknown.    -Etiology is prerenal azotemia from cardiorenal.    -On admission BUN/creatinine 47/2.8 and has gone up to 71/3.8.  ,   creat down to 3.52--3.47--3.45--3.0 today  Continue diuretics for fluid overload/edema  We will continue to monitor renal functions    2. Chronic kidney disease, probably has a stage IV chronic kidney disease as baseline  Renal ultrasound showed larger kidneys with multiple bilateral cysts suggestive of polycystic kidney disease. significant proteinuria+, will avoid ace-I for now for risk of MAURILIO   intact PTH is 122 and vitamin D level is nomal  Add cacitriol 0.25mcg daily  will continue to monitor renal functions to assess the baseline    2. Hypotension. Improved hemodynamic status now  Amlodipine was discontinued  Continue to monitor blood pressures     3. CHF.  -He is admitted with decompensated CHF.    -2D echocardiogram has been ordered.    -On admission he was with hypoxic respiratory failure and lower extremity edema.    -Improved clinically with the diuresis, continue current Lasix and continue to monitor inputs and outputs and renal functions     4.  Bilateral lower extremity wounds.    -On IV antibiotics as per ID recommendations. Vascular surgery evaluating for vascular insufficiency    5.  Anemia: Hb dropped to 6.1 today  GI eval pending  Monitor H/H and transfuse as needed  Add epogen sq qweekly    Awaiting discharge possibly today to [employer] [facility]  Signed By: Mena Mott MD     October 28, 2021

## 2021-10-28 NOTE — CONSULTS
Gastroenterology Consult     Referring Physician: Misti Avila MD     Consult Date: 10/28/2021     Subjective:     Chief Complaint: Bilateral legs weeping edema and pain    History of Present Illness: Jenifer Gallardo is a [de-identified] y.o. male who is seen in consultation for GI bleed - low hemoglobin    Patient was admitted to the hospital with complains of bilateral leg pain and weeping edema. Upon EMS arrival, patient's O2 sats was in the 70's wich bilateral lungs crackles. Patient improved on 15 L via nonrebreather and switched to nasal cannula in the ED. Bilateral legs with swollen with wounds and weeping edema.    -Initial ED workup showing elevated WBC 11.5, hgb 10, creatinine 3.52, BNP >35,000.  -10/28 labs: Hgb trending down, 6.1 today. Received 1 unit of PRBC.     -10/25 XR chest showing Left pleural effusion. Pulmonary vascular congestion pattern without overt pulmonary edema. Left basilar airspace disease more likely atelectasis than pneumonia. XR left foot with no osteomyelitis, right foot with Nonspecific nonfocal osteopenia. Kidney US shows Medical renal disease. No hydronephrosis. Numerous large bilateral renal cysts. Duplex US of milagros extremities did not show any DVTs. On exam patient is awake and alert. Breathing comfortably on 2 lit O2 nasal cannula. Denies nausea or vomiting, denies chest pain or shortness of breath, denies abdominal pain or lower extremities pain. Abdomen is distended, soft.        Past Medical History:   Diagnosis Date    High cholesterol     Hx of long term use of blood thinners     aspirin    Hyperlipidemia     Hypertension     Lymphedema     Vascular disease      Past Surgical History:   Procedure Laterality Date    HX OTHER SURGICAL      repair of tendon achilles    HX OTHER SURGICAL  2012    aortic anrysm 6/> cm diamater      Family History   Problem Relation Age of Onset    Hypertension Mother      Social History     Tobacco Use    Smoking status: Former Smoker Packs/day: 2.00     Years: 50.00     Pack years: 100.00     Quit date:      Years since quittin.8    Smokeless tobacco: Never Used   Substance Use Topics    Alcohol use: Not on file      No Known Allergies  Current Facility-Administered Medications   Medication Dose Route Frequency    0.9% sodium chloride infusion 250 mL  250 mL IntraVENous PRN    polyethylene glycol (MIRALAX) packet 17 g  17 g Oral DAILY    docusate sodium (COLACE) capsule 100 mg  100 mg Oral BID    [START ON 10/29/2021] calcitRIOL (ROCALTROL) capsule 0.25 mcg  0.25 mcg Oral DAILY    epoetin charlie-epbx (RETACRIT) injection 10,000 Units  10,000 Units SubCUTAneous Q7D    furosemide (LASIX) tablet 40 mg  40 mg Oral BID    metoprolol succinate (TOPROL-XL) XL tablet 50 mg  50 mg Oral DAILY    dilTIAZem IR (CARDIZEM) tablet 30 mg  30 mg Oral TIDAC    ammonium lactate (AMLACTIN) 12 % cream   Topical DAILY    zinc oxide-white petrolatum 17-57 % topical paste   Topical TID    sodium chloride (NS) flush 5-40 mL  5-40 mL IntraVENous Q8H    sodium chloride (NS) flush 5-40 mL  5-40 mL IntraVENous PRN    acetaminophen (TYLENOL) tablet 650 mg  650 mg Oral Q6H PRN    Or    acetaminophen (TYLENOL) suppository 650 mg  650 mg Rectal Q6H PRN    ondansetron (ZOFRAN ODT) tablet 4 mg  4 mg Oral Q8H PRN    Or    ondansetron (ZOFRAN) injection 4 mg  4 mg IntraVENous Q6H PRN    aspirin delayed-release tablet 81 mg  81 mg Oral DAILY    atorvastatin (LIPITOR) tablet 40 mg  40 mg Oral DAILY    hydrALAZINE (APRESOLINE) 20 mg/mL injection 20 mg  20 mg IntraVENous Q6H PRN    budesonide-formoteroL (SYMBICORT) 160-4.5 mcg/actuation HFA inhaler 2 Puff  2 Puff Inhalation BID RT    influenza vaccine  (6 mos+)(PF) (FLUARIX/FLULAVAL/FLUZONE QUAD) injection 0.5 mL  1 Each IntraMUSCular PRIOR TO DISCHARGE    piperacillin-tazobactam (ZOSYN) 3.375 g in 0.9% sodium chloride (MBP/ADV) 100 mL MBP  3.375 g IntraVENous Q8H        Review of Systems:  A detailed 10 organ review of systems is obtained with pertinent positives as listed in the History of Present Illness and Past Medical History. All others are negative. Objective:     Physical Exam:  Visit Vitals  BP (!) 124/48 (BP 1 Location: Left lower arm, BP Patient Position: At rest)   Pulse (!) 54   Temp 97.7 °F (36.5 °C)   Resp 18   Ht 6' (1.829 m)   Wt 113.4 kg (250 lb)   SpO2 94%   BMI 33.91 kg/m²        Skin: fragile skin, skin tear left arm with dressing, skin with some bruising. Bilateral legs compression wraps intact  HEENT: Sclerae anicteric. Extra-occular muscles are intact. No oral ulcers. No abnormal pigmentation of the lips. The neck is supple. Cardiovascular: Regular rate and rhythm. No murmurs, gallops, or rubs. PMI nondisplaced. Carotids without bruits. Respiratory:  Comfortable breathing with no accessory muscle use. Clear breath sounds with no wheezes, rales, or rhonchi. GI:  Abdomen softly distended and nontender. Rectal:  Deferred  Musculoskeletal: trace edema upper extremities, 2+ edema lower extremities. Neurological:  Gross memory appears intact. Patient is alert and oriented. Psychiatric:  Mood appears appropriate with judgement intact. Lymphatic:  No cervical or supraclavicular adenopathy.     Lab/Data Review:  Recent Results (from the past 24 hour(s))   RENAL FUNCTION PANEL    Collection Time: 10/28/21  7:08 AM   Result Value Ref Range    Sodium 152 (H) 136 - 145 mmol/L    Potassium 3.3 (L) 3.5 - 5.1 mmol/L    Chloride 114 (H) 97 - 108 mmol/L    CO2 27 21 - 32 mmol/L    Anion gap 11 5 - 15 mmol/L    Glucose 95 65 - 100 mg/dL     (H) 6 - 20 mg/dL    Creatinine 3.03 (H) 0.70 - 1.30 mg/dL    BUN/Creatinine ratio 44 (H) 12 - 20      GFR est AA 24 (L) >60 ml/min/1.73m2    GFR est non-AA 20 (L) >60 ml/min/1.73m2    Calcium 7.2 (L) 8.5 - 10.1 mg/dL    Phosphorus 3.8 2.6 - 4.7 mg/dL    Albumin 1.6 (L) 3.5 - 5.0 g/dL   CBC W/O DIFF    Collection Time: 10/28/21  7:08 AM Result Value Ref Range    WBC 14.3 (H) 4.1 - 11.1 K/uL    RBC 2.15 (L) 4.10 - 5.70 M/uL    HGB 6.1 (L) 12.1 - 17.0 g/dL    HCT 21.2 (L) 36.6 - 50.3 %    MCV 98.6 80.0 - 99.0 FL    MCH 28.4 26.0 - 34.0 PG    MCHC 28.8 (L) 30.0 - 36.5 g/dL    RDW 15.4 (H) 11.5 - 14.5 %    PLATELET 475 620 - 553 K/uL    MPV 11.0 8.9 - 12.9 FL    NRBC 0.3 (H) 0.0  WBC    ABSOLUTE NRBC 0.04 (H) 0.00 - 0.01 K/uL   TYPE & SCREEN    Collection Time: 10/28/21  7:08 AM   Result Value Ref Range    Crossmatch Expiration 10/31/2021,2359     ABO/Rh(D) B Positive     Antibody screen Negative     Unit number [id number]     Blood component type RC LR     Unit division 00     Status of unit Issued     [address] to transfuse     Crossmatch result Compatible         LOWER EXT ART PVR MULT LEVEL SEG PRESSURES   Final Result      DUPLEX LOWER EXT VENOUS BILAT   Final Result      US RETROPERITONEUM COMP   Final Result   1. Medical renal disease. No hydronephrosis. 2.  Numerous large bilateral renal cysts. 3.  Urinary bladder postvoid residual of 103 mL. XR CHEST PORT   Final Result   Worsening pulmonary edema, suggesting CHF or volume overload. XR CHEST PORT   Final Result      XR FOOT LT AP/LAT   Final Result   No radiographic evidence of osteomyelitis. XR FOOT RT AP/LAT   Final Result   Nonspecific nonfocal osteopenia. Assessment/Plan:   1. GIB/anemia - multifactorial      -Hgb 6.1. Received 1 unit of PRBC. No overt bleeding reported      -Monitor Hgb, transfuse as needed       -Retacrit has been ordered. -Repeat CBC In am  2. CHF       -BNP >35,000       -10/26/21 echocardiogram Estimated LVEF is 45 - 50%. -on oral Lasix         -Cardiology following. 3. Acute kidney injury on underlying chronic kidney disease        -creatinine imrpoving, 3.03 today       -Renal US shows medical renal disease, polycystic kidney       -Nephrology is following  4. Bilateral lower extremity wounds. -WBC 14.3       -wound culture shows multiple organisms       -On Zosyn IV       -wound care, compression wraps as ordered by surgery       -ID on board       -vascular surgery is following -       Active Problems:    Multiple open wounds of foot (10/21/2021)      Bilateral leg edema (10/22/2021)      Acute kidney injury ([facility] [address] (10/23/2021)         IP CONSULT TO PODIATRY  IP CONSULT TO INFECTIOUS DISEASES  IP CONSULT TO NEPHROLOGY  IP CONSULT TO GASTROENTEROLOGY  IP CONSULT TO VASCULAR SURGERY  IP CONSULT TO CARDIOLOGY    Thank you for allowing me to participate in this patients care  Cc Referring Physician   Guera Giron MD

## 2021-10-28 NOTE — PROGRESS NOTES
Infectious Diseases Progress Note Jacob Green MD 
[phone] Date:10/28/2021       Room:[facility] Patient Nick Howell     YOB: 1940     Age:81 y.o. 81M with history of smoking, hypertension, dyslipidemia, repaired AAA, obesity. Chronic venous stasis of the lower extremities with recurrent inflammation. 
  
2018 I saw him at [facility] for complications of venous stasis dermatitis. At that hospital visit right leg wound cultures revealed MSSA and PSEUDOMONAS, managed with a two weeks course of levofloxacin and doxycycline at that time. 
  
10/21/21 presents to hospital with a complaint of bilateral foot wound checks and lower extremity edema. Associated with bilateral lower extremity pain. EMS found him hypoxic and managed with supplemental oxygen. During the ED course chest x-ray showing pulmonary vascular congestion without overt pulmonary edema as well as left basilar airspace disease most likely atelectasis. Started on IV diuresis. Admitted to hospital and podiatry and wound care have been asked to see him for his condition. 
  
Wound cultures from the right and left foot suggestive of multiple organisms and I have been asked to see him in consultation. Subjective Subjective: 
Symptoms:  Stable. No shortness of breath or chest pain. Review of Systems Constitutional: Negative for fever. Respiratory: Negative for shortness of breath. Cardiovascular: Negative for chest pain. All other systems reviewed and are negative. Objective Vitals Last 24 Hours: TEMPERATURE:  Temp  Av.7 °F (36.5 °C)  Min: 97.5 °F (36.4 °C)  Max: 97.8 °F (36.6 °C) RESPIRATIONS RANGE: Resp  Av  Min: 18  Max: 18 PULSE OXIMETRY RANGE: SpO2  Av.6 %  Min: 94 %  Max: 99 % PULSE RANGE: Pulse  Av.5  Min: 48  Max: 77 BLOOD PRESSURE RANGE: Systolic (05PON), DIW:404 , Min:102 , QTD:718  
; Diastolic (44JWG), MARTA:73, Min:44, Max:54 I/O (24Hr):  
 
Intake/Output Summary (Last 24 hours) at 10/28/2021 1043 Last data filed at 10/28/2021 1577 Gross per 24 hour Intake  Output 730 ml Net -730 ml Objective: 
General Appearance:  Comfortable. Vital signs: (most recent): Blood pressure (!) 115/44, pulse (!) 55, temperature 97.8 °F (36.6 °C), resp. rate 18, height 6' (1.829 m), weight 113.4 kg (250 lb), SpO2 94 %. Vital signs are normal.  No fever. Constitutional: No acute distress Skin: Extremities and face reveal no rashes, lower feet wrapped in a dressing which are clean, dry, intact, chronic lymphedema noted in legs bilaterally HEENT: Sclerae anicteric. Extra-occular muscles are intact. No oral ulcers. The neck is supple and no masses. Cardiovascular: RRR Respiratory:  Clear breath sounds bilaterally with no wheezes, rales, or rhonchi. GI: Abdomen nondistended, soft, and nontender. Normal active bowel sounds. Musculoskeletal: No pitting edema of the lower legs. Able to move all ext Neurological:  Patient is alert and oriented. Cranial nerves II-XII grossly intact Psychiatric: Mood appears appropriate Labs/Imaging/Diagnostics Labs: CBC: 
Recent Labs 10/28/21 
0708 10/27/21 
1149 10/26/21 
0745 WBC 14.3* 17.4* 14.3*  
RBC 2.15* 2.65* 3.40* HGB 6.1* 7.6* 9.5* HCT 21.2* 25.6* 32.1*  
MCV 98.6 96.6 94.4  
RDW 15.4* 15.1* 15.0*  
 215 253 CHEMISTRIES: 
Recent Labs 10/28/21 
0708 10/27/21 
1149 10/26/21 
0745 * 146* 145  
K 3.3* 3.8 4.0  
* 107 109* CO2 27 31 30 * 143* 130* CA 7.2* 8.4* 9.0  8.8 PHOS 3.8 4.6 3.8 PT/INR:No results for input(s): INR, INREXT, INREXT in the last 72 hours. No lab exists for component: PROTIME APTT:No results for input(s): APTT in the last 72 hours. LIVER PROFILE:No results for input(s): AST, ALT in the last 72 hours. No lab exists for component: BILIDIR, BILITOT, ALKPHOS Lab Results Component Value Date/Time  ALT (SGPT) 24 10/21/2021 09:20 AM  
 AST (SGOT) 37 10/21/2021 09:20 AM  
 Alk. phosphatase 119 (H) 10/21/2021 09:20 AM  
 Bilirubin, total 0.6 10/21/2021 09:20 AM  
 
 
Imaging Last 24 Hours: 
No results found. Assessment//Plan Active Problems: 
  Multiple open wounds of foot (10/21/2021) Bilateral leg edema (10/22/2021) Acute kidney injury ([address] (10/23/2021) Assessment & Plan 81M with history of smoking, hypertension, dyslipidemia, repaired AAA, obesity. Chronic venous stasis of the lower extremities with recurrent inflammation. 
  
July 2018 I saw him at [facility] for complications of venous stasis dermatitis. At that hospital visit right leg wound cultures revealed MSSA and PSEUDOMONAS, managed with a two weeks course of levofloxacin and doxycycline at that time. 
  
10/21/21 presents to hospital with a complaint of bilateral foot wound checks and lower extremity edema. Associated with bilateral lower extremity pain. EMS found him hypoxic and managed with supplemental oxygen. During the ED course chest x-ray showing pulmonary vascular congestion without overt pulmonary edema as well as left basilar airspace disease most likely atelectasis. Started on IV diuresis. Admitted to hospital and podiatry and wound care have been asked to see him for his condition. 
  
Wound cultures from the right and left foot suggestive of multiple organisms and I have been asked to see him in consultation. 
  
MICROBIOLOGY 
  
7/28/18            R leg    MSSA Pseudomonas 
  
10/21/21          Blood   Negative 
  
10/21/21          R foot   Heavy Gram Negative Rods MULTIPLE COLONY TYPES 
                                    checking for possible BETA STREPTOCOCCUS 
  
10/21/21          L foot   Heavy Streptococci, beta hemolyticgroup C Myroides species Proteus vulgaris    Morganella morganii ssp morganii 
  
ASSESSMENT AND RECOMMENDATIONS 
  
1) Chronic venous stasis of the bilateral lower extremities further complicated by complex soft tissue infection. 
  
 Zosyn iv per pharmacy dosing through 11/1/21 Anticipate transfer to skilled nursing facility             Wound care inpatient and outpatient 
  
  
Electronically signed by Terence Hargrove MD on 10/28/2021 at 12:54 PM

## 2021-10-28 NOTE — PROGRESS NOTES
Hospitalist Progress Note ANGEL LUIS Bonilla, FNP-C Daily Progress Note: 10/28/2021 Subjective:  
Subjective Patient examined alert and oriented lying in bed Reports constipation on examination No overnight events reported No acute distress noted Review of Systems:  
Review of Systems Constitutional: Negative. HENT: Negative. Eyes: Negative. Respiratory: Negative. Cardiovascular: Negative. Gastrointestinal: Positive for constipation. Musculoskeletal: Negative. Neurological: Positive for weakness. Psychiatric/Behavioral: Negative. Objective:  
Objective Vitals: 
Patient Vitals for the past 12 hrs: 
 Temp Pulse Resp BP SpO2  
10/28/21 0751     98 % 10/28/21 0747     99 % 10/28/21 0746     99 % 10/28/21 0725 97.7 °F (36.5 °C) (!) 58 18 (!) 127/51 95 % 10/28/21 0347 97.8 °F (36.6 °C) (!) 56 18 (!) 124/48 95 % Physical Exam: 
Physical Exam 
Vitals and nursing note reviewed. Constitutional:   
   Appearance: He is obese. Eyes:  
   Conjunctiva/sclera: Conjunctivae normal.  
Cardiovascular:  
   Rate and Rhythm: Normal rate and regular rhythm. Pulmonary:  
   Effort: Pulmonary effort is normal.  
   Breath sounds: Normal breath sounds. Comments: 1L nc Abdominal:  
   General: Bowel sounds are normal.  
   Palpations: Abdomen is soft. Musculoskeletal:  
   Cervical back: Normal range of motion. Skin: 
   Comments: Bilateral lower extremity edema, superficial wounds Neurological:  
   Mental Status: He is alert and oriented to person, place, and time. Psychiatric:     
   Mood and Affect: Mood normal.     
   Behavior: Behavior normal.  
 
  
 
Lab Results: 
Recent Results (from the past 24 hour(s)) CBC W/O DIFF Collection Time: 10/27/21 11:49 AM  
Result Value Ref Range WBC 17.4 (H) 4.1 - 11.1 K/uL  
 RBC 2.65 (L) 4.10 - 5.70 M/uL HGB 7.6 (L) 12.1 - 17.0 g/dL HCT 25.6 (L) 36.6 - 50.3 %  MCV 96.6 80.0 - 99.0 FL  
 MCH 28.7 26.0 - 34.0 PG  
 MCHC 29.7 (L) 30.0 - 36.5 g/dL  
 RDW 15.1 (H) 11.5 - 14.5 % PLATELET 600 082 - 693 K/uL MPV 11.0 8.9 - 12.9 FL  
 NRBC 0.2 (H) 0.0  WBC ABSOLUTE NRBC 0.03 (H) 0.00 - 0.01 K/uL RENAL FUNCTION PANEL Collection Time: 10/27/21 11:49 AM  
Result Value Ref Range Sodium 146 (H) 136 - 145 mmol/L Potassium 3.8 3.5 - 5.1 mmol/L Chloride 107 97 - 108 mmol/L  
 CO2 31 21 - 32 mmol/L Anion gap 8 5 - 15 mmol/L Glucose 122 (H) 65 - 100 mg/dL  (H) 6 - 20 mg/dL Creatinine 3.45 (H) 0.70 - 1.30 mg/dL BUN/Creatinine ratio 41 (H) 12 - 20 GFR est AA 21 (L) >60 ml/min/1.73m2 GFR est non-AA 17 (L) >60 ml/min/1.73m2 Calcium 8.4 (L) 8.5 - 10.1 mg/dL Phosphorus 4.6 2.6 - 4.7 mg/dL Albumin 2.0 (L) 3.5 - 5.0 g/dL RENAL FUNCTION PANEL Collection Time: 10/28/21  7:08 AM  
Result Value Ref Range Sodium 152 (H) 136 - 145 mmol/L Potassium 3.3 (L) 3.5 - 5.1 mmol/L Chloride 114 (H) 97 - 108 mmol/L  
 CO2 27 21 - 32 mmol/L Anion gap 11 5 - 15 mmol/L Glucose 95 65 - 100 mg/dL  (H) 6 - 20 mg/dL Creatinine 3.03 (H) 0.70 - 1.30 mg/dL BUN/Creatinine ratio 44 (H) 12 - 20 GFR est AA 24 (L) >60 ml/min/1.73m2 GFR est non-AA 20 (L) >60 ml/min/1.73m2 Calcium 7.2 (L) 8.5 - 10.1 mg/dL Phosphorus 3.8 2.6 - 4.7 mg/dL Albumin 1.6 (L) 3.5 - 5.0 g/dL CBC W/O DIFF Collection Time: 10/28/21  7:08 AM  
Result Value Ref Range WBC 14.3 (H) 4.1 - 11.1 K/uL  
 RBC 2.15 (L) 4.10 - 5.70 M/uL HGB 6.1 (L) 12.1 - 17.0 g/dL HCT 21.2 (L) 36.6 - 50.3 % MCV 98.6 80.0 - 99.0 FL  
 MCH 28.4 26.0 - 34.0 PG  
 MCHC 28.8 (L) 30.0 - 36.5 g/dL  
 RDW 15.4 (H) 11.5 - 14.5 % PLATELET 510 007 - 527 K/uL MPV 11.0 8.9 - 12.9 FL  
 NRBC 0.3 (H) 0.0  WBC ABSOLUTE NRBC 0.04 (H) 0.00 - 0.01 K/uL  
TYPE & SCREEN Collection Time: 10/28/21  7:08 AM  
Result Value Ref Range  Crossmatch Expiration 10/31/2021,2359 ABO/Rh(D) B Positive Antibody screen Negative Unit number [id number] Blood component type  LR Unit division 00 Status of unit Allocated TRANSFUSION STATUS Ok to transfuse Crossmatch result Compatible Diagnostic Images: CT Results  (Last 48 hours) None Current Medications: 
 
Current Facility-Administered Medications:  
  0.9% sodium chloride infusion 250 mL, 250 mL, IntraVENous, PRN, Cathi Radha, NP 
  furosemide (LASIX) tablet 40 mg, 40 mg, Oral, BID, Joana Keyon A, NP, 40 mg at 10/27/21 2026 
  metoprolol succinate (TOPROL-XL) XL tablet 50 mg, 50 mg, Oral, DAILY, Zaevelyn Keyon A, NP, 50 mg at 10/27/21 4451   dilTIAZem IR (CARDIZEM) tablet 30 mg, 30 mg, Oral, TIDAC, Katia Chung MD, 30 mg at 10/27/21 1652   ammonium lactate (AMLACTIN) 12 % cream, , Topical, DAILY, Fidelia Valencia PA-C, Given at 10/27/21 7089   zinc oxide-white petrolatum 17-57 % topical paste, , Topical, TID, Fidelia Valencia PA-C, Given at 10/27/21 2055   sodium chloride (NS) flush 5-40 mL, 5-40 mL, IntraVENous, Q8H, Saul Hernandez PA-C, 10 mL at 10/28/21 1751   sodium chloride (NS) flush 5-40 mL, 5-40 mL, IntraVENous, PRN, Saul Hernandez PA-C, 10 mL at 10/27/21 2005   acetaminophen (TYLENOL) tablet 650 mg, 650 mg, Oral, Q6H PRN **OR** acetaminophen (TYLENOL) suppository 650 mg, 650 mg, Rectal, Q6H PRN, Saul Hernandez PA-C 
  polyethylene glycol (MIRALAX) packet 17 g, 17 g, Oral, DAILY PRN, Saul Hernandez PA-C 
  ondansetron (ZOFRAN ODT) tablet 4 mg, 4 mg, Oral, Q8H PRN **OR** ondansetron (ZOFRAN) injection 4 mg, 4 mg, IntraVENous, Q6H PRN, Saul Hernandez PA-C 
  aspirin delayed-release tablet 81 mg, 81 mg, Oral, DAILY, Saul Hernandez PA-C, 81 mg at 10/27/21 8669   atorvastatin (LIPITOR) tablet 40 mg, 40 mg, Oral, DAILY, Saul Hernandez PA-C, 40 mg at 10/27/21 1795   hydrALAZINE (APRESOLINE) 20 mg/mL injection 20 mg, 20 mg, IntraVENous, Q6H PRN, Wander Lloyd PA-C 
  budesonide-formoteroL (SYMBICORT) 160-4.5 mcg/actuation HFA inhaler 2 Puff, 2 Puff, Inhalation, BID RT, Wander Lloyd PA-C, 2 Puff at 10/28/21 4688   influenza vaccine 2021-22 (6 mos+)(PF) (FLUARIX/FLULAVAL/FLUZONE QUAD) injection 0.5 mL, 1 Each, IntraMUSCular, PRIOR TO DISCHARGE, Gonzales Gant PA-C 
  piperacillin-tazobactam (ZOSYN) 3.375 g in 0.9% sodium chloride (MBP/ADV) 100 mL MBP, 3.375 g, IntraVENous, Q8H, Arianna Parada MD, Last Rate: 25 mL/hr at 10/28/21 0335, 3.375 g at 10/28/21 0335 ASSESSMENT: 
Pt admitted for complaints of bilateral foot wounds, and leg edema. Xray of right foot showing osteopenia, Infectious disease and wound RN consulted for recommendations. Right and Left foot wound culture growing heavy streptococci, myroides, proteun, morganella, pasteurella. ID consulted for IV antibiotic management.  
 
1. Bilateral foot/leg wounds: 
-Culture grew multiple organisms 
-Continue IV Zosyn until 11/1 
-Infectious disease following 
-Venous duplex negative for DVTs bilaterally 
-Vascular surgeon following, continue compression wraps bilateral legs 
-Continue daily dressing changes 2. Acute on chronic kidney disease: 
-CR improving 
-monitor closely in setting of diuresis 
-outpatient nephrology f/u warranted 3. CHF with preserved EF: 
-EF 44-39%, reduced systolic function, aortic valve regurgitation 
-cardiology following 
-continue PO lasix, cardizem, metoprolol 4. Ambulatory dysfunction/generalized weakness: 
-continue pt/ot as ordered 
-SNF placement once medically stable 5. Atrial flutter: 
-cardiology following 
-continue cardizem, metoprolol 
-anticoags held due to low hgb 6. Constipation: 
-start miralax daily, doscusate sodium 100mg bid 7. Anemia: 
-hgb 6.1, transfuse 1u prbc 
-obtain GI eval, r/o gi bleed 
-continue to tre h/h Full Code Dvt Prophylaxis none, due to lower extremity wound and low hgb GI Prophylaxis none, tolerating diet Disposition: pending hgb stable, transfuse 1u prbs, GI eval 
 
 
Above treatment plan reviewed and discussed with patient in detail at bedside, all questions answered. Care Plan discussed with: interdisciplinary team 
 
Total time spent with patient: >35 minutes.  
 
Lauren Gallagher NP

## 2021-10-28 NOTE — PROGRESS NOTES
Comprehensive Nutrition Assessment    Type and Reason for Visit: OANH nutrition re-screen/LOS    Nutrition Recommendations/Plan:   Continue current diet  Nursing to monitor BM, I/Os, %PO    Nutrition Assessment:  Admitted 2/2 multiple open wounds of foot. Current appetite reported as good. Per pt 100%PO Cardiac Restriction: Low Fat/Low Chol/High Fiber/JOSÉ MIGUEL. RD added raza BID. Labs: Creat 3.03. HH 6.1/21.2. Na 152. K 3.3 Cl 114. Ca 7.2. CRP 3.7. . Meds reviewed. Malnutrition Assessment:  Malnutrition Status:  No malnutrition      Nutrition Related Findings:  No NFPE performed, appears nourished, obese. Pt denies N/V/D. Reports constipation and would like prune juice. BM 10/25. Pt denies C/S issues. Edema: LLE/RLE non-pitting. Wounds:    Multiple, Open wounds, Moisture associate skin damage (Anterior left MASD, lower right leg MASD, left/right anterior foot draining wound, left proximal anterior arm, right dorsal thigh non-blanchable redness, left buttocks non-blanchable redness)       Current Nutrition Therapies:  ADULT ORAL NUTRITION SUPPLEMENT Lunch, Dinner; Wound Healing Supplement  ADULT DIET Regular; Low Fat/Low Chol/High Fiber/JOSÉ MIGUEL; Dislikes: beets and squash  ADULT ORAL NUTRITION SUPPLEMENT Breakfast, Dinner; Other Supplement; Prune juice    Anthropometric Measures:  · Height:  6' (182.9 cm)  · Current Body Wt:  113.4 kg (250 lb)   · Admission Body Wt:  250 lb    · Usual Body Wt:  113.4 kg (250 lb)     · Ideal Body Wt:  178 lbs:  140.4 %   · BMI Category:  Obese class 1 (BMI 30.0-34. 9)     Wt Readings from Last 3 Encounters:   10/21/21 113.4 kg (250 lb)   02/11/20 132.5 kg (292 lb)       Nutrition Interventions:   Food and/or Nutrient Delivery: Continue current diet  Nutrition Education and Counseling: No recommendations at this time  Coordination of Nutrition Care: No recommendation at this time    Nutrition Monitoring and Evaluation:   Behavioral-Environmental Outcomes: None identified  Food/Nutrient Intake Outcomes: Food and nutrient intake  Physical Signs/Symptoms Outcomes: Biochemical data, Weight    Discharge Planning:    Continue current diet     Electronically signed by Arun Malave RD on 10/28/2021 at 12:22 PM    Contact: Ext [phone]

## 2021-10-28 NOTE — PROGRESS NOTES
Cardiology Progress Note      10/28/2021 11:55 AM    Admit Date: 10/21/2021    Admit Diagnosis: Multiple open wounds of foot [S91.309A]  Bilateral leg edema [R60.0]      Subjective:     Patient seen and examined; he has no new complaints.  Hemoglobin dropped this morning, he is getting blood transfusion  Visit Vitals  BP (!) 124/48 (BP 1 Location: Left lower arm, BP Patient Position: At rest)   Pulse (!) 54   Temp 97.7 °F (36.5 °C)   Resp 18   Ht 6' (1.829 m)   Wt 113.4 kg (250 lb)   SpO2 94%   BMI 33.91 kg/m²     Current Facility-Administered Medications   Medication Dose Route Frequency    0.9% sodium chloride infusion 250 mL  250 mL IntraVENous PRN    polyethylene glycol (MIRALAX) packet 17 g  17 g Oral DAILY    docusate sodium (COLACE) capsule 100 mg  100 mg Oral BID    [START ON 10/29/2021] calcitRIOL (ROCALTROL) capsule 0.25 mcg  0.25 mcg Oral DAILY    epoetin charlie-epbx (RETACRIT) injection 10,000 Units  10,000 Units SubCUTAneous Q7D    furosemide (LASIX) tablet 40 mg  40 mg Oral BID    metoprolol succinate (TOPROL-XL) XL tablet 50 mg  50 mg Oral DAILY    dilTIAZem IR (CARDIZEM) tablet 30 mg  30 mg Oral TIDAC    ammonium lactate (AMLACTIN) 12 % cream   Topical DAILY    zinc oxide-white petrolatum 17-57 % topical paste   Topical TID    sodium chloride (NS) flush 5-40 mL  5-40 mL IntraVENous Q8H    sodium chloride (NS) flush 5-40 mL  5-40 mL IntraVENous PRN    acetaminophen (TYLENOL) tablet 650 mg  650 mg Oral Q6H PRN    Or    acetaminophen (TYLENOL) suppository 650 mg  650 mg Rectal Q6H PRN    ondansetron (ZOFRAN ODT) tablet 4 mg  4 mg Oral Q8H PRN    Or    ondansetron (ZOFRAN) injection 4 mg  4 mg IntraVENous Q6H PRN    aspirin delayed-release tablet 81 mg  81 mg Oral DAILY    atorvastatin (LIPITOR) tablet 40 mg  40 mg Oral DAILY    hydrALAZINE (APRESOLINE) 20 mg/mL injection 20 mg  20 mg IntraVENous Q6H PRN    budesonide-formoteroL (SYMBICORT) 160-4.5 mcg/actuation HFA inhaler 2 Puff  2 Puff Inhalation BID RT    influenza vaccine 2021-22 (6 mos+)(PF) (FLUARIX/FLULAVAL/FLUZONE QUAD) injection 0.5 mL  1 Each IntraMUSCular PRIOR TO DISCHARGE    piperacillin-tazobactam (ZOSYN) 3.375 g in 0.9% sodium chloride (MBP/ADV) 100 mL MBP  3.375 g IntraVENous Q8H         Objective:      Physical Exam:  Visit Vitals  BP (!) 124/48 (BP 1 Location: Left lower arm, BP Patient Position: At rest)   Pulse (!) 54   Temp 97.7 °F (36.5 °C)   Resp 18   Ht 6' (1.829 m)   Wt 113.4 kg (250 lb)   SpO2 94%   BMI 33.91 kg/m²     General Appearance:  Well developed, well nourished,alert and oriented x 3, and individual in no acute distress. Ears/Nose/Mouth/Throat:   Hearing grossly normal.         Neck: Supple. Chest:   Lungs clear to auscultation bilaterally. Cardiovascular:  Regular rate and rhythm, S1, S2 normal, no murmur. Abdomen:   Soft, non-tender, bowel sounds are active. Extremities: Chronic edema bilaterally. Skin: Warm and dry.                Data Review:   Labs:    Recent Results (from the past 24 hour(s))   RENAL FUNCTION PANEL    Collection Time: 10/28/21  7:08 AM   Result Value Ref Range    Sodium 152 (H) 136 - 145 mmol/L    Potassium 3.3 (L) 3.5 - 5.1 mmol/L    Chloride 114 (H) 97 - 108 mmol/L    CO2 27 21 - 32 mmol/L    Anion gap 11 5 - 15 mmol/L    Glucose 95 65 - 100 mg/dL     (H) 6 - 20 mg/dL    Creatinine 3.03 (H) 0.70 - 1.30 mg/dL    BUN/Creatinine ratio 44 (H) 12 - 20      GFR est AA 24 (L) >60 ml/min/1.73m2    GFR est non-AA 20 (L) >60 ml/min/1.73m2    Calcium 7.2 (L) 8.5 - 10.1 mg/dL    Phosphorus 3.8 2.6 - 4.7 mg/dL    Albumin 1.6 (L) 3.5 - 5.0 g/dL   CBC W/O DIFF    Collection Time: 10/28/21  7:08 AM   Result Value Ref Range    WBC 14.3 (H) 4.1 - 11.1 K/uL    RBC 2.15 (L) 4.10 - 5.70 M/uL    HGB 6.1 (L) 12.1 - 17.0 g/dL    HCT 21.2 (L) 36.6 - 50.3 %    MCV 98.6 80.0 - 99.0 FL    MCH 28.4 26.0 - 34.0 PG    MCHC 28.8 (L) 30.0 - 36.5 g/dL    RDW 15.4 (H) 11.5 - 14.5 %    PLATELET 441 150 - 400 K/uL    MPV 11.0 8.9 - 12.9 FL    NRBC 0.3 (H) 0.0  WBC    ABSOLUTE NRBC 0.04 (H) 0.00 - 0.01 K/uL   TYPE & SCREEN    Collection Time: 10/28/21  7:08 AM   Result Value Ref Range    Crossmatch Expiration 10/31/2021,2359     ABO/Rh(D) B Positive     Antibody screen Negative     Unit number [id number]     Blood component type RC LR     Unit division 00     Status of unit Issued     [address] to transfuse     Crossmatch result Compatible        Assessment:   Paroxysmal Atrial Flutter  Severe anemia  HFmrEF  Acute on CKD  Chronic Venous Ulcers  Hypertension  Hyperlipidemia     Plan:   -Echo findings reviewed with patient. Agree with oral diuresis.  -He needs regular echocardiographic surveillance for his aortic stenosis. -Per patient he has had atrial fibrillation in the past but he not sure about having been on anticoagulation in the past. His VTNHY0HFBP score is 4, HASBLED score is 3; he has a reoughly comparable stroke as well as bleeding risk. Nonetheless, I think he will benefit from long term anticoagulation.  Given his low hemoglobin level and elevated bleeding risk, will hold off on anticoagulation for now given his transfusio requiring anemia.  -Wall motion abnormalities noted on echo; he has no anginal symptoms; will closely follow and plan for stress testing in the future as an outpatient  -We will follow    Thank you for allowing us to participate in the care of your patient

## 2021-10-29 NOTE — PROGRESS NOTES
PROGRESS NOTE      Chief Complaints:  No issues overnight. HPI and  Objective:    Patient was comfortable. Patient denies any fever chills chest pain shortness breath nausea vomiting abdominal pain generalized weakness. Review of Systems:  Rest of review of system negative, personally reviewed. EXAM:  Visit Vitals  BP (!) 111/50 (BP 1 Location: Right upper arm, BP Patient Position: At rest)   Pulse (!) 52   Temp 97.7 °F (36.5 °C)   Resp 20   Ht 6' (1.829 m)   Wt 250 lb (113.4 kg)   SpO2 98%   BMI 33.91 kg/m²       Patient is awake   Head and neck atraumatic, normocephalic. ENT: No hoarse voice  Cardiac system regular rate rhythm. Pulmonary no audible wheeze  Chest wall excursion normal with respiration cycle  Abdomen is soft not particularly distended. Neurologically nonfocal.  Skin is warm and moist.  Psychosocial: Cooperative. Vascular examination as previously noted no changes.     Recent Results (from the past 24 hour(s))   RENAL FUNCTION PANEL    Collection Time: 10/29/21  4:30 AM   Result Value Ref Range    Sodium 147 (H) 136 - 145 mmol/L    Potassium 3.9 3.5 - 5.1 mmol/L    Chloride 110 (H) 97 - 108 mmol/L    CO2 29 21 - 32 mmol/L    Anion gap 8 5 - 15 mmol/L    Glucose 104 (H) 65 - 100 mg/dL     (H) 6 - 20 mg/dL    Creatinine 3.59 (H) 0.70 - 1.30 mg/dL    BUN/Creatinine ratio 40 (H) 12 - 20      GFR est AA 20 (L) >60 ml/min/1.73m2    GFR est non-AA 16 (L) >60 ml/min/1.73m2    Calcium 8.3 (L) 8.5 - 10.1 mg/dL    Phosphorus 4.6 2.6 - 4.7 mg/dL    Albumin 1.8 (L) 3.5 - 5.0 g/dL   CBC W/O DIFF    Collection Time: 10/29/21  4:30 AM   Result Value Ref Range    WBC 14.1 (H) 4.1 - 11.1 K/uL    RBC 2.75 (L) 4.10 - 5.70 M/uL    HGB 7.9 (L) 12.1 - 17.0 g/dL    HCT 26.5 (L) 36.6 - 50.3 %    MCV 96.4 80.0 - 99.0 FL    MCH 28.7 26.0 - 34.0 PG    MCHC 29.8 (L) 30.0 - 36.5 g/dL    RDW 17.2 (H) 11.5 - 14.5 %    PLATELET 732 575 - 926 K/uL    MPV 11.6 8.9 - 12.9 FL    NRBC 0.3 (H) 0.0  WBC    ABSOLUTE NRBC 0.04 (H) 0.00 - 0.01 K/uL       ASSESSMENT:   Patient is [de-identified] y.o. with diagnosis of : Active Problems:    Multiple open wounds of foot (10/21/2021)      Bilateral leg edema (10/22/2021)      Acute kidney injury ([facility] [address] (10/23/2021)        PLAN:                 Patient has a modified compression wraps applied both legs. Wound is to be changed every other day. I will continue monitor his progress.

## 2021-10-29 NOTE — PROGRESS NOTES
Problem: Pressure Injury - Risk of  Goal: *Prevention of pressure injury  Description: Document Socrates Scale and appropriate interventions in the flowsheet.   Outcome: Not Progressing Towards Goal  Note: Pressure Injury Interventions:  Sensory Interventions: Assess need for specialty bed    Moisture Interventions: Minimize layers    Activity Interventions: Increase time out of bed    Mobility Interventions: HOB 30 degrees or less, Float heels    Nutrition Interventions: Document food/fluid/supplement intake    Friction and Shear Interventions: HOB 30 degrees or less, Apply protective barrier, creams and emollients

## 2021-10-29 NOTE — PROGRESS NOTES
Patient cleaned of stool via bedpan. Patient stool dark and tarry, loose. Occult stool sent to lab at this time.

## 2021-10-29 NOTE — PROGRESS NOTES
Lab contacted regarding patients missing hgb results from CBC ordered yesterday. Lab states it has not been collected yet. This nurse asked for someone to draw this stat.

## 2021-10-29 NOTE — PROGRESS NOTES
Cardiology Progress Note      10/29/2021 11:55 AM    Admit Date: 10/21/2021    Admit Diagnosis: Multiple open wounds of foot [S91.309A]  Bilateral leg edema [R60.0]      Subjective:     Patient seen and examined; he has no new complaints. Hemoglobin is stable today.   Visit Vitals  BP (!) 111/50 (BP 1 Location: Right upper arm, BP Patient Position: At rest)   Pulse (!) 52   Temp 97.7 °F (36.5 °C)   Resp 20   Ht 6' (1.829 m)   Wt 113.4 kg (250 lb)   SpO2 97%   BMI 33.91 kg/m²     Current Facility-Administered Medications   Medication Dose Route Frequency    0.9% sodium chloride infusion 250 mL  250 mL IntraVENous PRN    polyethylene glycol (MIRALAX) packet 17 g  17 g Oral DAILY    docusate sodium (COLACE) capsule 100 mg  100 mg Oral BID    calcitRIOL (ROCALTROL) capsule 0.25 mcg  0.25 mcg Oral DAILY    epoetin charlie-epbx (RETACRIT) injection 10,000 Units  10,000 Units SubCUTAneous Q7D    furosemide (LASIX) tablet 40 mg  40 mg Oral BID    metoprolol succinate (TOPROL-XL) XL tablet 50 mg  50 mg Oral DAILY    dilTIAZem IR (CARDIZEM) tablet 30 mg  30 mg Oral TIDAC    ammonium lactate (AMLACTIN) 12 % cream   Topical DAILY    zinc oxide-white petrolatum 17-57 % topical paste   Topical TID    sodium chloride (NS) flush 5-40 mL  5-40 mL IntraVENous Q8H    sodium chloride (NS) flush 5-40 mL  5-40 mL IntraVENous PRN    acetaminophen (TYLENOL) tablet 650 mg  650 mg Oral Q6H PRN    Or    acetaminophen (TYLENOL) suppository 650 mg  650 mg Rectal Q6H PRN    ondansetron (ZOFRAN ODT) tablet 4 mg  4 mg Oral Q8H PRN    Or    ondansetron (ZOFRAN) injection 4 mg  4 mg IntraVENous Q6H PRN    [Held by provider] aspirin delayed-release tablet 81 mg  81 mg Oral DAILY    atorvastatin (LIPITOR) tablet 40 mg  40 mg Oral DAILY    hydrALAZINE (APRESOLINE) 20 mg/mL injection 20 mg  20 mg IntraVENous Q6H PRN    budesonide-formoteroL (SYMBICORT) 160-4.5 mcg/actuation HFA inhaler 2 Puff  2 Puff Inhalation BID RT    influenza vaccine 2021-22 (6 mos+)(PF) (FLUARIX/FLULAVAL/FLUZONE QUAD) injection 0.5 mL  1 Each IntraMUSCular PRIOR TO DISCHARGE    piperacillin-tazobactam (ZOSYN) 3.375 g in 0.9% sodium chloride (MBP/ADV) 100 mL MBP  3.375 g IntraVENous Q8H         Objective:      Physical Exam:  Visit Vitals  BP (!) 111/50 (BP 1 Location: Right upper arm, BP Patient Position: At rest)   Pulse (!) 52   Temp 97.7 °F (36.5 °C)   Resp 20   Ht 6' (1.829 m)   Wt 113.4 kg (250 lb)   SpO2 97%   BMI 33.91 kg/m²     General Appearance:  Well developed, well nourished,alert and oriented x 3, and individual in no acute distress. Ears/Nose/Mouth/Throat:   Hearing grossly normal.         Neck: Supple. Chest:   Lungs clear to auscultation bilaterally. Cardiovascular:  Regular rate and rhythm, S1, S2 normal, no murmur. Abdomen:   Soft, non-tender, bowel sounds are active. Extremities: Chronic edema bilaterally. Skin: Warm and dry.                Data Review:   Labs:    Recent Results (from the past 24 hour(s))   RENAL FUNCTION PANEL    Collection Time: 10/29/21  4:30 AM   Result Value Ref Range    Sodium 147 (H) 136 - 145 mmol/L    Potassium 3.9 3.5 - 5.1 mmol/L    Chloride 110 (H) 97 - 108 mmol/L    CO2 29 21 - 32 mmol/L    Anion gap 8 5 - 15 mmol/L    Glucose 104 (H) 65 - 100 mg/dL     (H) 6 - 20 mg/dL    Creatinine 3.59 (H) 0.70 - 1.30 mg/dL    BUN/Creatinine ratio 40 (H) 12 - 20      GFR est AA 20 (L) >60 ml/min/1.73m2    GFR est non-AA 16 (L) >60 ml/min/1.73m2    Calcium 8.3 (L) 8.5 - 10.1 mg/dL    Phosphorus 4.6 2.6 - 4.7 mg/dL    Albumin 1.8 (L) 3.5 - 5.0 g/dL   CBC W/O DIFF    Collection Time: 10/29/21  4:30 AM   Result Value Ref Range    WBC 14.1 (H) 4.1 - 11.1 K/uL    RBC 2.75 (L) 4.10 - 5.70 M/uL    HGB 7.9 (L) 12.1 - 17.0 g/dL    HCT 26.5 (L) 36.6 - 50.3 %    MCV 96.4 80.0 - 99.0 FL    MCH 28.7 26.0 - 34.0 PG    MCHC 29.8 (L) 30.0 - 36.5 g/dL    RDW 17.2 (H) 11.5 - 14.5 %    PLATELET 675 738 - 031 K/uL    MPV 11.6 8.9 - 12.9 FL    NRBC 0.3 (H) 0.0  WBC    ABSOLUTE NRBC 0.04 (H) 0.00 - 0.01 K/uL       Assessment:   Paroxysmal Atrial Flutter  Severe anemia  HFmrEF  Acute on CKD  Chronic Venous Ulcers  Hypertension  Hyperlipidemia     Plan:   -Echo findings reviewed with patient. Agree with oral diuresis.  -He needs regular echocardiographic surveillance for his aortic stenosis. -Per patient he has had atrial fibrillation in the past but he not sure about having been on anticoagulation in the past. His XVDFL5GSUB score is 4, HASBLED score is 3; he has a reoughly comparable stroke as well as bleeding risk. Nonetheless, I think he will benefit from long term anticoagulation.  Given his low hemoglobin level and elevated bleeding risk, will hold off on anticoagulation for now given his transfusio requiring anemia.  -Wall motion abnormalities noted on echo; he has no anginal symptoms; will closely follow and plan for stress testing in the future as an outpatient  -We will follow    Thank you for allowing us to participate in the care of your patient

## 2021-10-29 NOTE — PROGRESS NOTES
Problem: Self Care Deficits Care Plan (Adult)  Goal: *Acute Goals and Plan of Care (Insert Text)  Description: Pt will be MI sup<->sit in prep for EOB ADL's  Pt will be MI  LB dressing EOB level  Pt will be MI  sit<-> prep for toilet transfer  Pt will be MI  toilet transfer with LRAD  Pt will be MI  toileting/cloth mgmt LRAD  Pt will be MI  grooming standing sink  Pt will be MI bathing sitting/standing sink LRAD  Pt will be MI milagros UE HEP in prep for self care tasks    Outcome: Progressing Towards Goal   OCCUPATIONAL THERAPY TREATMENT  Patient: Shira Grayson (73 y.o. male)  Date: 10/29/2021  Diagnosis: Multiple open wounds of foot [S91.309A]  Bilateral leg edema [R60.0] <principal problem not specified>       Precautions: Fall  Chart, occupational therapy assessment, plan of care, and goals were reviewed. ASSESSMENT  Patient continues with skilled OT services and is progressing towards goals. Patient received semi supine in bed upon ESCALERA'S arrival, on  2L of oxygen via NC and agreeable to work with therapist.  Pt declined bed to chair tf at this time 2/2 lack of sleep. Patient required SBA x1 for bed mobility, SBA supine to sit EOB for washing face, combing hair and brushing teeth with SBA/set-up. Patient able to sit EOB for ~ 13 minutes with SBA and presented good sitting balance with vc's for correct posture. TA for donning milagros socks EOB 2/2 decreased anterior reach. EOB, patient educated on and completed bilateral UE HEP in prep for self care task and strength for OOB ADL'S, see gid below. Pt left sitting EOB with PT for cont therapy. Patient would benefit from continued skilled Occupational services while at [facility] in order to increase safety and independence with self care and functional tranfers/mobility. Recommend at discharge to SNF when medically appropriate.      Current Level of Function Impacting Discharge (ADLs): TA for LB dressing and SBA/set-up for grooming    Other factors to consider for discharge: Time of onset, medical prognosis/diagnosis, severity of deficits, PLOF, home environment, and family support        PLAN :  Patient continues to benefit from skilled intervention to address the above impairments. Continue treatment per established plan of care. to address goals. Recommend next OT session: standing at sink for grooming task    Recommendation for discharge: (in order for the patient to meet his/her long term goals)  Solo Hudson    This discharge recommendation:  Has been made in collaboration with the attending provider and/or case management    IF patient discharges home will need the following DME: TBD       SUBJECTIVE:   Patient stated I don't want to go to the chair.     OBJECTIVE DATA SUMMARY:   Cognitive/Behavioral Status:  Neurologic State: Alert  Orientation Level: Oriented X4  Cognition: Appropriate for age attention/concentration             Functional Mobility and Transfers for ADLs:  Bed Mobility:  Supine to Sit: Stand-by assistance  Scooting: Stand-by assistance    Transfers:             Balance:  Sitting: Intact; With support  Sitting - Static: Good (unsupported)  Sitting - Dynamic: Good (unsupported)    ADL Intervention:       Grooming  Grooming Assistance: Stand-by assistance;Set-up  Position Performed: Seated edge of bed  Washing Face: Stand-by assistance;Set-up  Brushing Teeth: Stand-by assistance;Set-up  Brushing/Combing Hair: Stand-by assistance;Set-up         Lower Body Dressing Assistance  Dressing Assistance: Total assistance(dependent)  Socks:  Total assistance (dependent)  Position Performed: Seated edge of bed         Therapeutic Exercises:   Exercise Sets Reps AROM AAROM PROM Self PROM Comments   Shoulder flex/ext 2 10 [x] [] [] [] Seated EOB, vc's to pace self   Elbow flex/ext 2 10 [x] [] [] []    Chest press 2 10 [x] [] [] []         Pain:  0/10    Activity Tolerance:   Fair and requires rest breaks  Please refer to the flowsheet for vital signs taken during this treatment. After treatment patient left in no apparent distress:   Seated EOB with PT    COMMUNICATION/COLLABORATION:   The patients plan of care was discussed with: Registered nurse.      JW Shaw  Time Calculation: 23 mins

## 2021-10-29 NOTE — PROGRESS NOTES
Upon assessment, IV noted to be pulled out. This nurse attempted x2. Jacklyn Teague RN at bedside with attempt at this time.

## 2021-10-29 NOTE — PROGRESS NOTES
Spoke with Dr. Princess Willard in cardiology regarding patient's sinus kalani, continue to hold metoprolol and cardizem. Will continue to reassess.

## 2021-10-29 NOTE — PROGRESS NOTES
Infectious Diseases Progress Note Jacob Benoit MD 
[phone] Date:10/29/2021       Room:[facility] Patient Rafal Flores     YOB: 1940     Age:81 y.o. 81M with history of smoking, hypertension, dyslipidemia, repaired AAA, obesity. Chronic venous stasis of the lower extremities with recurrent inflammation. 
  
2018 I saw him at [facility] for complications of venous stasis dermatitis. At that hospital visit right leg wound cultures revealed MSSA and PSEUDOMONAS, managed with a two weeks course of levofloxacin and doxycycline at that time. 
  
10/21/21 presents to hospital with a complaint of bilateral foot wound checks and lower extremity edema. Associated with bilateral lower extremity pain. EMS found him hypoxic and managed with supplemental oxygen. During the ED course chest x-ray showing pulmonary vascular congestion without overt pulmonary edema as well as left basilar airspace disease most likely atelectasis. Started on IV diuresis. Admitted to hospital and podiatry and wound care have been asked to see him for his condition. 
  
Wound cultures from the right and left foot suggestive of multiple organisms and I have been asked to see him in consultation. Subjective Subjective: 
Symptoms:  Stable. No shortness of breath or chest pain. Review of Systems Constitutional: Negative for fever. Respiratory: Negative for shortness of breath. Cardiovascular: Negative for chest pain. All other systems reviewed and are negative. Objective Vitals Last 24 Hours: TEMPERATURE:  Temp  Av.8 °F (36.6 °C)  Min: 97.4 °F (36.3 °C)  Max: 98.2 °F (36.8 °C) RESPIRATIONS RANGE: Resp  Av.4  Min: 18  Max: 20 
PULSE OXIMETRY RANGE: SpO2  Av.9 %  Min: 94 %  Max: 98 % PULSE RANGE: Pulse  Av  Min: 52  Max: 77 BLOOD PRESSURE RANGE: Systolic (49WVO), QFR:766 , Min:108 , PZJ:778  
; Diastolic (06USA), YVQ:21, Min:48, Max:66 I/O (24Hr):  
 
Intake/Output Summary (Last 24 hours) at 10/29/2021 1132 Last data filed at 10/28/2021 1145 Gross per 24 hour Intake 228 ml Output  Net 228 ml Objective: 
General Appearance:  Comfortable. Vital signs: (most recent): Blood pressure (!) 111/50, pulse (!) 52, temperature 97.7 °F (36.5 °C), resp. rate 20, height 6' (1.829 m), weight 113.4 kg (250 lb), SpO2 97 %. Vital signs are normal.  No fever. Constitutional: No acute distress Skin: Extremities and face reveal no rashes, lower feet wrapped in a dressing which are clean, dry, intact, chronic lymphedema noted in legs bilaterally HEENT: Sclerae anicteric. Extra-occular muscles are intact. No oral ulcers. The neck is supple and no masses. Cardiovascular: RRR Respiratory:  Clear breath sounds bilaterally with no wheezes, rales, or rhonchi. GI: Abdomen nondistended, soft, and nontender. Normal active bowel sounds. Musculoskeletal: No pitting edema of the lower legs. Able to move all ext Neurological:  Patient is alert and oriented. Cranial nerves II-XII grossly intact Psychiatric: Mood appears appropriate Labs/Imaging/Diagnostics Labs: CBC: 
Recent Labs 10/29/21 
0430 10/28/21 
0708 10/27/21 
1149 WBC 14.1* 14.3* 17.4*  
RBC 2.75* 2.15* 2.65* HGB 7.9* 6.1* 7.6* HCT 26.5* 21.2* 25.6* MCV 96.4 98.6 96.6 RDW 17.2* 15.4* 15.1*  
 180 215 CHEMISTRIES: 
Recent Labs 10/29/21 
0430 10/28/21 
0708 10/27/21 
1149 * 152* 146*  
K 3.9 3.3* 3.8 * 114* 107 CO2 29 27 31 * 132* 143* CA 8.3* 7.2* 8.4* PHOS 4.6 3.8 4.6 PT/INR:No results for input(s): INR, INREXT, INREXT in the last 72 hours. No lab exists for component: PROTIME APTT:No results for input(s): APTT in the last 72 hours. LIVER PROFILE:No results for input(s): AST, ALT in the last 72 hours. No lab exists for component: BILIDIR, BILITOT, ALKPHOS Lab Results Component Value Date/Time  ALT (SGPT) 24 10/21/2021 09:20 AM  
 AST (SGOT) 37 10/21/2021 09:20 AM  
 Alk. phosphatase 119 (H) 10/21/2021 09:20 AM  
 Bilirubin, total 0.6 10/21/2021 09:20 AM  
 
 
Imaging Last 24 Hours: 
No results found. Assessment//Plan Active Problems: 
  Multiple open wounds of foot (10/21/2021) Bilateral leg edema (10/22/2021) Acute kidney injury ([address] (10/23/2021) Assessment & Plan 81M with history of smoking, hypertension, dyslipidemia, repaired AAA, obesity. Chronic venous stasis of the lower extremities with recurrent inflammation. 
  
July 2018 I saw him at [facility] for complications of venous stasis dermatitis. At that hospital visit right leg wound cultures revealed MSSA and PSEUDOMONAS, managed with a two weeks course of levofloxacin and doxycycline at that time. 
  
10/21/21 presents to hospital with a complaint of bilateral foot wound checks and lower extremity edema. Associated with bilateral lower extremity pain. EMS found him hypoxic and managed with supplemental oxygen. During the ED course chest x-ray showing pulmonary vascular congestion without overt pulmonary edema as well as left basilar airspace disease most likely atelectasis. Started on IV diuresis. Admitted to hospital and podiatry and wound care have been asked to see him for his condition. 
  
Wound cultures from the right and left foot suggestive of multiple organisms and I have been asked to see him in consultation. 
  
MICROBIOLOGY 
  
7/28/18            R leg    MSSA Pseudomonas 
  
10/21/21          Blood   Negative 
  
10/21/21          R foot   Heavy Gram Negative Rods MULTIPLE COLONY TYPES 
                                    checking for possible BETA STREPTOCOCCUS 
  
10/21/21          L foot   Heavy Streptococci, beta hemolyticgroup C Myroides species Proteus vulgaris Morganella morganii ssp morganii 
  
ASSESSMENT AND RECOMMENDATIONS 
 
0) Worsening hemoglobin with fecal occult positive.  
 
 Supportive care with transfusions as needed Gastroenterology as needed   
1) Chronic venous stasis of the bilateral lower extremities further complicated by complex soft tissue infection. 
  
 Zosyn iv per pharmacy dosing through 11/1/21             Wound care inpatient and outpatient 
  
  
Electronically signed by Balaji Lemus MD on 10/29/2021 at 12:54 PM

## 2021-10-29 NOTE — PROGRESS NOTES
Problem: Mobility Impaired (Adult and Pediatric)  Goal: *Acute Goals and Plan of Care (Insert Text)  Description: 1. Pt will be I with LE HEP in 7 days. 2. Pt will perform bed mobility with MOD IND  in 7 days. 3. Pt will perform transfers with MIN A  in 7 days. 4. Pt will be able to stand > 5 mins with LRAD safely with SBA in 7 days. 5.   Pt to be able to amb 75ft with LRAD and sup in 7 days  Outcome: Progressing Towards Goal   PHYSICAL THERAPY REASSESSMENT  Patient: Chelsey Conroy (22 y.o. male)  Date: 10/29/2021  Diagnosis: Multiple open wounds of foot [S91.309A]  Bilateral leg edema [R60.0] <principal problem not specified>       Precautions: Fall  Chart, physical therapy assessment, plan of care and goals were reviewed. ASSESSMENT  Patient continues with skilled PT services and is progressing towards goals. Pt has been seen 1 visit since evaluation on 10/23/21 and 1 attempted visit not including today. Pt received sitting at EOB finishing with OT prior to my arrival. Pt agreeable to work with PT and reporting no pain but just feeling very tired and weak. Pt is making slow progress with PT and requires additional time and rest breaks during treatment. Pt required max A x 1 to perform sit to stand transfer today, amb a few feet and then said he needed to sit down as he felt like he might vomit. Bedside recliner pulled behind him for him to sit down. Pt rested for about 3 min and then attempted sit to stand again requiring max A with increased difficulty due to him trying to stand from a lower surface. Once pt stood, he amb the remaining distance to the 12 feet to the toilet with slow eunice and shuffling steps with CGA for safety. Once on toilet, pt remained sitting for about 3 min for BM and then required max A to stand from toilet and dependent for toileting.  After pt cleaned up he said he needed to sit down again to rest and was left sitting on the toilet for another 2 min rest break before standing and ambulating the remaining 15 feet back to the bed. Pt very fatigued at this point requesting to lay back down and requiring mod A for sit to sup transfer. Reviewed LE exercises with pt which he agreed to perform during the day after he has some time to rest.  Pt set up with tray table and call bell within reach. Recommend d/c to SNF upon discharge. Other factors to consider for discharge: impaired mobility, level of assist         PLAN :  Patient continues to benefit from skilled intervention to address the above impairments. Continue treatment per established plan of care. to address goals. Recommendation for discharge: (in order for the patient to meet his/her long term goals)  Solo Hudson    This discharge recommendation:  Has been made in collaboration with the attending provider and/or case management    IF patient discharges home will need the following DME: none       SUBJECTIVE:   Patient stated i'm so tired. I don't want to sit up in the chair.     OBJECTIVE DATA SUMMARY:   Critical Behavior:  Neurologic State: Alert  Orientation Level: Oriented X4  Cognition: Appropriate for age attention/concentration  Safety/Judgement: Fall prevention, Good awareness of safety precautions  Functional Mobility Training:  Bed Mobility:     Supine to Sit: Stand-by assistance  Sit to Supine: Moderate assistance;Assist x1  Scooting: Stand-by assistance        Transfers:  Sit to Stand: Maximum assistance;Assist x1  Stand to Sit: Maximum assistance;Assist x1                             Balance:  Sitting: Intact; With support  Sitting - Static: Good (unsupported)  Sitting - Dynamic: Good (unsupported)  Standing: Impaired  Standing - Static: Fair  Standing - Dynamic : Poor  Ambulation/Gait Training:  Distance (ft): 30 Feet (ft)  Assistive Device: Gait belt;Walker, rolling  Ambulation - Level of Assistance: Contact guard assistance                 Base of Support: Widened     Speed/Erica: Slow;Shuffled  Step Length: Left shortened;Right shortened                         Therapeutic Exercises:   Reviewed ankle pumps, hip abd/add, heel slides to be performed later today  Pain Ratin/10    Activity Tolerance:   Fair, requires frequent rest breaks, and observed SOB with activity  Please refer to the flowsheet for vital signs taken during this treatment. After treatment patient left in no apparent distress:   Supine in bed, Call bell within reach, and Side rails x 3    COMMUNICATION/COLLABORATION:   The patients plan of care was discussed with: Registered nurse.      Lamine Melgoza   Time Calculation: 25 mins

## 2021-10-29 NOTE — PROGRESS NOTES
CM reviewing chart. Patient is still medically acute at this time. Has been accepted at [facility] once medically stable. All updates sent via [employer] [facility] [facility].     DC plan is [facility]

## 2021-10-29 NOTE — PROGRESS NOTES
Progress Note Patient: Robin Rultedge MRN: [id number]  SSN: [id number] YOB: 1940  Age: [de-identified] y.o. Sex: male Admit Date: 10/21/2021 LOS: 7 days Subjective:  
GI is following for GI bleed - low hemoglobin Patient seen in room awake and alert eating his lunch. Denies acute pain. No nausea or vomiting. Dark stools yesterday. Stool test positive for occult blood. Hgb today is 7.9. He received 1 unit of PRBC yesterday for hgb of 6.1. 
 
10/28 GI consult note: 
Patient was admitted to the hospital with complains of bilateral leg pain and weeping edema. Upon EMS arrival, patient's O2 sats was in the 70's wich bilateral lungs crackles. Patient improved on 15 L via nonrebreather and switched to nasal cannula in the ED. Bilateral legs with swollen with wounds and weeping edema. 
  
-Initial ED workup showing elevated WBC 11.5, hgb 10, creatinine 3.52, BNP >35,000. 
-10/28 labs: Hgb trending down, 6.1 today. Received 1 unit of PRBC.  
  
-10/25 XR chest showing Left pleural effusion. Pulmonary vascular congestion pattern without overt pulmonary edema. Left basilar airspace disease more likely atelectasis than pneumonia. XR left foot with no osteomyelitis, right foot with Nonspecific nonfocal osteopenia. Kidney US shows Medical renal disease. No hydronephrosis. Numerous large bilateral renal cysts. Duplex US of milagros extremities did not show any DVTs. Objective:  
 
Vitals:  
 10/29/21 0735 10/29/21 0944 10/29/21 1156 10/29/21 1225 BP: (!) 111/50  (!) 116/43 (!) 114/44 Pulse: (!) 52  (!) 50 (!) 47 Resp: 20  20 20 Temp: 97.7 °F (36.5 °C)  97.7 °F (36.5 °C) 97.7 °F (36.5 °C) SpO2: 98% 97% 90% 93% Weight:      
Height:      
  
 
Intake and Output: 
Current Shift: No intake/output data recorded. Last three shifts: 10/27 1901 - 10/29 0700 In: 228 Out: 730 [Urine:730] Physical Exam:  
Skin:  fragile skin, skin tear left arm with dressing, skin with some bruising. Bilateral legs compression wraps intact HEENT: Sclerae anicteric. Extra-occular muscles are intact. No abnormal pigmentation of the lips. The neck is supple. Cardiovascular: Regular rate and rhythm. Respiratory:  Comfortable breathing with no accessory muscle use. GI:  Abdomen nondistended, soft, and nontender. No enlargement of the liver or spleen. No masses palpable. Rectal:  Deferred Musculoskeletal: trace edema upper extremities, 2+ edema lower extremities. Neurological:  Gross memory appears intact. Patient is alert and oriented. Psychiatric:  Mood appears appropriate with judgement intact. Lymphatic:  No visible adenopathy Lab/Data Review: 
Recent Results (from the past 24 hour(s)) RENAL FUNCTION PANEL Collection Time: 10/29/21  4:30 AM  
Result Value Ref Range Sodium 147 (H) 136 - 145 mmol/L Potassium 3.9 3.5 - 5.1 mmol/L Chloride 110 (H) 97 - 108 mmol/L  
 CO2 29 21 - 32 mmol/L Anion gap 8 5 - 15 mmol/L Glucose 104 (H) 65 - 100 mg/dL  (H) 6 - 20 mg/dL Creatinine 3.59 (H) 0.70 - 1.30 mg/dL BUN/Creatinine ratio 40 (H) 12 - 20 GFR est AA 20 (L) >60 ml/min/1.73m2 GFR est non-AA 16 (L) >60 ml/min/1.73m2 Calcium 8.3 (L) 8.5 - 10.1 mg/dL Phosphorus 4.6 2.6 - 4.7 mg/dL Albumin 1.8 (L) 3.5 - 5.0 g/dL CBC W/O DIFF Collection Time: 10/29/21  4:30 AM  
Result Value Ref Range WBC 14.1 (H) 4.1 - 11.1 K/uL  
 RBC 2.75 (L) 4.10 - 5.70 M/uL HGB 7.9 (L) 12.1 - 17.0 g/dL HCT 26.5 (L) 36.6 - 50.3 % MCV 96.4 80.0 - 99.0 FL  
 MCH 28.7 26.0 - 34.0 PG  
 MCHC 29.8 (L) 30.0 - 36.5 g/dL  
 RDW 17.2 (H) 11.5 - 14.5 % PLATELET 273 806 - 898 K/uL MPV 11.6 8.9 - 12.9 FL  
 NRBC 0.3 (H) 0.0  WBC ABSOLUTE NRBC 0.04 (H) 0.00 - 0.01 K/uL IRON PROFILE Collection Time: 10/29/21  4:30 AM  
Result Value Ref Range Iron 44 35 - 150 ug/dL TIBC 202 (L) 250 - 450 ug/dL Iron % saturation 22 20 - 50 % FERRITIN  Collection Time: 10/29/21  4:30 AM  
Result Value Ref Range Ferritin 71 26 - 388 ng/mL LOWER EXT ART PVR MULT LEVEL SEG PRESSURES Final Result DUPLEX LOWER EXT VENOUS BILAT Final Result US RETROPERITONEUM COMP Final Result 1. Medical renal disease. No hydronephrosis. 2.  Numerous large bilateral renal cysts. 3.  Urinary bladder postvoid residual of 103 mL. XR CHEST PORT Final Result Worsening pulmonary edema, suggesting CHF or volume overload. XR CHEST PORT Final Result XR FOOT LT AP/LAT Final Result No radiographic evidence of osteomyelitis. XR FOOT RT AP/LAT Final Result Nonspecific nonfocal osteopenia. Assessment:  
 
Active Problems: 
  Multiple open wounds of foot (10/21/2021) Bilateral leg edema (10/22/2021) Acute kidney injury ([address] (10/23/2021) Plan:  
1. GIB/anemia - multifactorial 
    -Hgb 7.9 after receiving 1 unit of PRBC yesterday. -Monitor Hgb, transfuse as needed 
    -If hgb continues to drop, plan for EGD next week. -Retacrit has been ordered. -Repeat CBC In am 
2. CHF 
     -BNP >35,000 
     -10/26/21 echocardiogram Estimated LVEF is 45 - 50%. -on oral Lasix  
      -Cardiology following. 3. Acute kidney injury on underlying chronic kidney disease  
     -creatinine back up to 3.59 
     -Renal US shows medical renal disease, polycystic kidney 
     -Nephrology is following 4. Bilateral lower extremity wounds. -WBC 14.3 
     -wound culture shows multiple organisms 
     -On Zosyn IV 
     -wound care, compression wraps as ordered by surgery 
     -ID on board 
     -vascular surgery is following - Patient discussed with Dr Octaviano Thomas and agrees to above impression and plan. Thank you for allowing me to participate in this patients care Signed By: Ayse Najera NP October 29, 2021

## 2021-10-29 NOTE — PROGRESS NOTES
Hospitalist Progress Note Subjective:  
Daily Progress Note: 10/29/2021 4:55 PM 
 
Hospital Course: 
Pt admitted for complaints of bilateral foot wounds, and leg edema. Xray of right foot showing osteopenia, Infectious disease and wound RN consulted for recommendations. Right and Left foot wound culture growing heavy streptococci, myroides, proteun, morganella, pasteurella. ID consulted for IV antibiotic management.  
10/28: Hemoglobin dropped to 6.1 and was transfused one unit of PRBCs, found to have occult blood positive, GI was consulted.  
 
  
Subjective:Pt seen in room, scheduled for EGD today, however, was cancelled Current Facility-Administered Medications Medication Dose Route Frequency  0.9% sodium chloride infusion 250 mL  250 mL IntraVENous PRN  polyethylene glycol (MIRALAX) packet 17 g  17 g Oral DAILY  docusate sodium (COLACE) capsule 100 mg  100 mg Oral BID  calcitRIOL (ROCALTROL) capsule 0.25 mcg  0.25 mcg Oral DAILY  epoetin charlie-epbx (RETACRIT) injection 10,000 Units  10,000 Units SubCUTAneous Q7D  
 furosemide (LASIX) tablet 40 mg  40 mg Oral BID  metoprolol succinate (TOPROL-XL) XL tablet 50 mg  50 mg Oral DAILY  dilTIAZem IR (CARDIZEM) tablet 30 mg  30 mg Oral TIDAC  ammonium lactate (AMLACTIN) 12 % cream   Topical DAILY  zinc oxide-white petrolatum 17-57 % topical paste   Topical TID  sodium chloride (NS) flush 5-40 mL  5-40 mL IntraVENous Q8H  
 sodium chloride (NS) flush 5-40 mL  5-40 mL IntraVENous PRN  
 acetaminophen (TYLENOL) tablet 650 mg  650 mg Oral Q6H PRN Or  
 acetaminophen (TYLENOL) suppository 650 mg  650 mg Rectal Q6H PRN  
 ondansetron (ZOFRAN ODT) tablet 4 mg  4 mg Oral Q8H PRN Or  
 ondansetron (ZOFRAN) injection 4 mg  4 mg IntraVENous Q6H PRN  
 [Held by provider] aspirin delayed-release tablet 81 mg  81 mg Oral DAILY  atorvastatin (LIPITOR) tablet 40 mg  40 mg Oral DAILY  hydrALAZINE (APRESOLINE) 20 mg/mL injection 20 mg  20 mg IntraVENous Q6H PRN  
 budesonide-formoteroL (SYMBICORT) 160-4.5 mcg/actuation HFA inhaler 2 Puff  2 Puff Inhalation BID RT  
 influenza vaccine  (6 mos+)(PF) (FLUARIX/FLULAVAL/FLUZONE QUAD) injection 0.5 mL  1 Each IntraMUSCular PRIOR TO DISCHARGE  piperacillin-tazobactam (ZOSYN) 3.375 g in 0.9% sodium chloride (MBP/ADV) 100 mL MBP  3.375 g IntraVENous Q8H Review of Systems: 
 
Review of Systems Constitutional: Negative for chills and fever. Respiratory: Positive for shortness of breath. Negative for cough. Cardiovascular: Negative for chest pain and palpitations. Gastrointestinal: Negative for heartburn and nausea. Neurological: Negative for dizziness and headaches. Objective:  
 
Visit Vitals BP (!) 129/50 (BP 1 Location: Right upper arm, BP Patient Position: At rest;Lying) Pulse (!) 55 Temp 97.6 °F (36.4 °C) Resp 20 Ht 6' (1.829 m) Wt 113.4 kg (250 lb) SpO2 98% BMI 33.91 kg/m² O2 Flow Rate (L/min): 2 l/min O2 Device: Nasal cannula Temp (24hrs), Av.8 °F (36.6 °C), Min:97.6 °F (36.4 °C), Max:98.2 °F (36.8 °C) No intake/output data recorded. 10/27 1901 - 10/29 0700 In: 228 Out: 730 [Urine:730] PHYSICAL EXAM: 
  General: He is not in acute distress. 
   Appearance: He is obese. HENT:  
   Mouth/Throat:  
   Dentition: Abnormal dentition. Dental caries present. Cardiovascular:  
   Rate and Rhythm: Normal rate and regular rhythm.  
   Pulses: Normal pulses.  
   Heart sounds: Normal heart sounds. Pulmonary:  
   Effort: Pulmonary effort is normal.  
   Breath sounds: Normal breath sounds. Abdominal:  
   General: Bowel sounds are normal.  
   Palpations: Abdomen is soft. Genitourinary: 
   Comments: voiding  clear yellow urine. Skin: 
   General: Skin is warm and dry.  
   Comments: Bilateral feet with dressings, sacrum with redness Neurological:  
   Mental Status: He is oriented to person, place, and time.  
  
 Data Review Recent Results (from the past 24 hour(s)) RENAL FUNCTION PANEL Collection Time: 10/29/21  4:30 AM  
Result Value Ref Range Sodium 147 (H) 136 - 145 mmol/L Potassium 3.9 3.5 - 5.1 mmol/L Chloride 110 (H) 97 - 108 mmol/L  
 CO2 29 21 - 32 mmol/L Anion gap 8 5 - 15 mmol/L Glucose 104 (H) 65 - 100 mg/dL  (H) 6 - 20 mg/dL Creatinine 3.59 (H) 0.70 - 1.30 mg/dL BUN/Creatinine ratio 40 (H) 12 - 20 GFR est AA 20 (L) >60 ml/min/1.73m2 GFR est non-AA 16 (L) >60 ml/min/1.73m2 Calcium 8.3 (L) 8.5 - 10.1 mg/dL Phosphorus 4.6 2.6 - 4.7 mg/dL Albumin 1.8 (L) 3.5 - 5.0 g/dL CBC W/O DIFF Collection Time: 10/29/21  4:30 AM  
Result Value Ref Range WBC 14.1 (H) 4.1 - 11.1 K/uL  
 RBC 2.75 (L) 4.10 - 5.70 M/uL HGB 7.9 (L) 12.1 - 17.0 g/dL HCT 26.5 (L) 36.6 - 50.3 % MCV 96.4 80.0 - 99.0 FL  
 MCH 28.7 26.0 - 34.0 PG  
 MCHC 29.8 (L) 30.0 - 36.5 g/dL  
 RDW 17.2 (H) 11.5 - 14.5 % PLATELET 484 517 - 501 K/uL MPV 11.6 8.9 - 12.9 FL  
 NRBC 0.3 (H) 0.0  WBC ABSOLUTE NRBC 0.04 (H) 0.00 - 0.01 K/uL IRON PROFILE Collection Time: 10/29/21  4:30 AM  
Result Value Ref Range Iron 44 35 - 150 ug/dL TIBC 202 (L) 250 - 450 ug/dL Iron % saturation 22 20 - 50 % FERRITIN Collection Time: 10/29/21  4:30 AM  
Result Value Ref Range Ferritin 71 26 - 388 ng/mL LOWER EXT ART PVR MULT LEVEL SEG PRESSURES Final Result DUPLEX LOWER EXT VENOUS BILAT Final Result US RETROPERITONEUM COMP Final Result 1. Medical renal disease. No hydronephrosis. 2.  Numerous large bilateral renal cysts. 3.  Urinary bladder postvoid residual of 103 mL. XR CHEST PORT Final Result Worsening pulmonary edema, suggesting CHF or volume overload. XR CHEST PORT Final Result XR FOOT LT AP/LAT Final Result No radiographic evidence of osteomyelitis. XR FOOT RT AP/LAT Final Result Nonspecific nonfocal osteopenia. Active Problems: 
  Multiple open wounds of foot (10/21/2021) Bilateral leg edema (10/22/2021) Acute kidney injury ([address] (10/23/2021) Assessment/Plan: 1. Bilateral foot ulcers- secondary to chronic venous stasis, ID following, 
Cultures w/ multiorganisms, IV zosyn until 11/1 Lower leg venous duplex done, no DVTs, vascular recommending compression wraps for both legs.  
Wound care ordered, aquacel Ag to both feet.  
  
2.  Acute on chronic kidney disease- nephrology following, creatinine  3.45 
  
3.  CHF- diastolic dysfunction, EF 74-23%, reduced systolic function, aortic valve regurgitation, with RVAP at 11. PO lasix bid, cardiology following,  
  
4. Generalized weakness- OT/PT , OOB to chair as tolerated 
  
5. Atrial flutter- paroxsymal , on cardizem, cardiology following,  
Continue telemetry 
  
6. Anemia-  Secondary to GI bleed , positive occult blood, GI consulted, Received 1 unit of PRBCs, HGB   7.9, repeat cbc in am. 
 
7. Discharge- SNF planned, CM following.  
  
 
 
 
DVT Prophylaxis: sequential compression Code Status: 
Full Code POA: 
 
Care Plan discussed with:  
________patient, staff nurse_______________________________________________________ Kristal Robles NP

## 2021-10-29 NOTE — PROGRESS NOTES
Renal Progress Note    Patient: Eddie Esquivel MRN: [id number]  SSN: [id number]    YOB: 1940  Age: [de-identified] y.o. Sex: male      Admit Date: 10/21/2021    LOS: 7 days     Subjective:   Patient seen at bedside. Alert and awake, no acute distress.    No SOB,   Creat 3.5 today  Hb up to 7.9 after receiving pRBC Tx  No new c/o today    Current Facility-Administered Medications   Medication Dose Route Frequency    0.9% sodium chloride infusion 250 mL  250 mL IntraVENous PRN    polyethylene glycol (MIRALAX) packet 17 g  17 g Oral DAILY    docusate sodium (COLACE) capsule 100 mg  100 mg Oral BID    calcitRIOL (ROCALTROL) capsule 0.25 mcg  0.25 mcg Oral DAILY    epoetin charlie-epbx (RETACRIT) injection 10,000 Units  10,000 Units SubCUTAneous Q7D    furosemide (LASIX) tablet 40 mg  40 mg Oral BID    metoprolol succinate (TOPROL-XL) XL tablet 50 mg  50 mg Oral DAILY    dilTIAZem IR (CARDIZEM) tablet 30 mg  30 mg Oral TIDAC    ammonium lactate (AMLACTIN) 12 % cream   Topical DAILY    zinc oxide-white petrolatum 17-57 % topical paste   Topical TID    sodium chloride (NS) flush 5-40 mL  5-40 mL IntraVENous Q8H    sodium chloride (NS) flush 5-40 mL  5-40 mL IntraVENous PRN    acetaminophen (TYLENOL) tablet 650 mg  650 mg Oral Q6H PRN    Or    acetaminophen (TYLENOL) suppository 650 mg  650 mg Rectal Q6H PRN    ondansetron (ZOFRAN ODT) tablet 4 mg  4 mg Oral Q8H PRN    Or    ondansetron (ZOFRAN) injection 4 mg  4 mg IntraVENous Q6H PRN    [Held by provider] aspirin delayed-release tablet 81 mg  81 mg Oral DAILY    atorvastatin (LIPITOR) tablet 40 mg  40 mg Oral DAILY    hydrALAZINE (APRESOLINE) 20 mg/mL injection 20 mg  20 mg IntraVENous Q6H PRN    budesonide-formoteroL (SYMBICORT) 160-4.5 mcg/actuation HFA inhaler 2 Puff  2 Puff Inhalation BID RT    influenza vaccine 2021-22 (6 mos+)(PF) (FLUARIX/FLULAVAL/FLUZONE QUAD) injection 0.5 mL  1 Each IntraMUSCular PRIOR TO DISCHARGE    piperacillin-tazobactam (ZOSYN) 3.375 g in 0.9% sodium chloride (MBP/ADV) 100 mL MBP  3.375 g IntraVENous Q8H        Vitals:    10/29/21 0735 10/29/21 0944 10/29/21 1156 10/29/21 1225   BP: (!) 111/50  (!) 116/43 (!) 114/44   Pulse: (!) 52  (!) 50 (!) 47   Resp: 20  20 20   Temp: 97.7 °F (36.5 °C)  97.7 °F (36.5 °C) 97.7 °F (36.5 °C)   SpO2: 98% 97% 90% 93%   Weight:       Height:         Objective:   General: alert awake well-oriented, no acute distress. HEENT: EOMI, no Icterus, no Pallor,  mucosa moist.  Neck: Neck is supple, No JVD  Lungs: breathsounds normal,  no rhonchi, no rales,   CVS: heart sounds normal,  no murmurs, no rubs. GI: soft, nontender, normal BS. Extremeties: no cyanosis, 1+  edema,   Neuro: Alert, awake, oriented x3, No new focal deficits, moving all extremeties well. Skin: normal skin turgor, no skin rashes. Intake and Output:  Current Shift: No intake/output data recorded. Last three shifts: 10/27 1901 - 10/29 0700  In: 228   Out: 730 [Urine:730]      Lab/Data Review:  Recent Labs     10/29/21  0430 10/28/21  0708 10/27/21  1149   WBC 14.1* 14.3* 17.4*   HGB 7.9* 6.1* 7.6*   HCT 26.5* 21.2* 25.6*    180 215     Recent Labs     10/29/21  0430 10/28/21  0708 10/27/21  1149   * 152* 146*   K 3.9 3.3* 3.8   * 114* 107   CO2 29 27 31   * 95 122*   * 132* 143*   CREA 3.59* 3.03* 3.45*   CA 8.3* 7.2* 8.4*   PHOS 4.6 3.8 4.6   ALB 1.8* 1.6* 2.0*     No results for input(s): PH, PCO2, PO2, HCO3, FIO2 in the last 72 hours.   Recent Results (from the past 24 hour(s))   RENAL FUNCTION PANEL    Collection Time: 10/29/21  4:30 AM   Result Value Ref Range    Sodium 147 (H) 136 - 145 mmol/L    Potassium 3.9 3.5 - 5.1 mmol/L    Chloride 110 (H) 97 - 108 mmol/L    CO2 29 21 - 32 mmol/L    Anion gap 8 5 - 15 mmol/L    Glucose 104 (H) 65 - 100 mg/dL     (H) 6 - 20 mg/dL    Creatinine 3.59 (H) 0.70 - 1.30 mg/dL    BUN/Creatinine ratio 40 (H) 12 - 20      GFR est AA 20 (L) >60 ml/min/1.73m2    GFR est non-AA 16 (L) >60 ml/min/1.73m2    Calcium 8.3 (L) 8.5 - 10.1 mg/dL    Phosphorus 4.6 2.6 - 4.7 mg/dL    Albumin 1.8 (L) 3.5 - 5.0 g/dL   CBC W/O DIFF    Collection Time: 10/29/21  4:30 AM   Result Value Ref Range    WBC 14.1 (H) 4.1 - 11.1 K/uL    RBC 2.75 (L) 4.10 - 5.70 M/uL    HGB 7.9 (L) 12.1 - 17.0 g/dL    HCT 26.5 (L) 36.6 - 50.3 %    MCV 96.4 80.0 - 99.0 FL    MCH 28.7 26.0 - 34.0 PG    MCHC 29.8 (L) 30.0 - 36.5 g/dL    RDW 17.2 (H) 11.5 - 14.5 %    PLATELET 445 492 - 975 K/uL    MPV 11.6 8.9 - 12.9 FL    NRBC 0.3 (H) 0.0  WBC    ABSOLUTE NRBC 0.04 (H) 0.00 - 0.01 K/uL   IRON PROFILE    Collection Time: 10/29/21  4:30 AM   Result Value Ref Range    Iron 44 35 - 150 ug/dL    TIBC 202 (L) 250 - 450 ug/dL    Iron % saturation 22 20 - 50 %   FERRITIN    Collection Time: 10/29/21  4:30 AM   Result Value Ref Range    Ferritin 71 26 - 388 ng/mL        Assessment and Plan:     1 acute kidney injury on underlying chronic kidney disease stage unknown.    -Etiology is prerenal azotemia from cardiorenal.    -On admission BUN/creatinine 47/2.8 and has gone up to 71/3.8.  ,   creat down to 3.52--3.47--3.45--3.0--3.5 today( 3.0 yesterday is probably a lab error)  Continue diuretics for fluid overload/edema  We will continue to monitor renal functions    2. Chronic kidney disease, probably has a stage IV chronic kidney disease as baseline  Renal ultrasound showed larger kidneys with multiple bilateral cysts suggestive of polycystic kidney disease. significant proteinuria+, will avoid ace-I for now for risk of MAURILIO   intact PTH is 122 and vitamin D level is nomal  Added cacitriol 0.25mcg daily  will continue to monitor renal functions to assess the baseline    2. Hypotension. Improved hemodynamic status now  Amlodipine was discontinued  Continue to monitor blood pressures     3. CHF.  -He is admitted with decompensated CHF.    -2D echocardiogram has been ordered.    -On admission he was with hypoxic respiratory failure and lower extremity edema.    -Improved clinically with the diuresis, continue current Lasix and continue to monitor inputs and outputs and renal functions     4.  Bilateral lower extremity wounds.    -On IV antibiotics as per ID recommendations. Vascular surgery evaluating for vascular insufficiency    5.  Anemia: Hb dropped to 6.1 and up to 7.9 today post Txc  GI eval pending  Monitor H/H and transfuse as needed  Add epogen sq [facility] By: Bridgett Kay MD     October 29, 2021

## 2021-10-30 NOTE — PROGRESS NOTES
Hospitalist Progress Note Subjective:  
Daily Progress Note: 10/30/2021 12:29 PM 
 
Hospital Course: 
Pt admitted for complaints of bilateral foot wounds, and leg edema. Xray of right foot showing osteopenia, Infectious disease and wound RN consulted for recommendations. Right and Left foot wound culture growing heavy streptococci, myroides, proteun, morganella, pasteurella. ID consulted for IV antibiotic management.  
10/28: Hemoglobin dropped to 6.1 and was transfused one unit of PRBCs, found to have occult blood positive, GI was consulted and plans for EGD next week. Subjective: Pt seen in room, complaints of indigestion Current Facility-Administered Medications Medication Dose Route Frequency  magnesium hydroxide (MILK OF MAGNESIA) 400 mg/5 mL oral suspension 30 mL  30 mL Oral DAILY PRN  
 alum-mag hydroxide-simeth (MYLANTA) oral suspension 30 mL  30 mL Oral QID PRN  
 0.9% sodium chloride infusion 250 mL  250 mL IntraVENous PRN  polyethylene glycol (MIRALAX) packet 17 g  17 g Oral DAILY  docusate sodium (COLACE) capsule 100 mg  100 mg Oral BID  calcitRIOL (ROCALTROL) capsule 0.25 mcg  0.25 mcg Oral DAILY  epoetin charlie-epbx (RETACRIT) injection 10,000 Units  10,000 Units SubCUTAneous Q7D  
 furosemide (LASIX) tablet 40 mg  40 mg Oral BID  metoprolol succinate (TOPROL-XL) XL tablet 50 mg  50 mg Oral DAILY  dilTIAZem IR (CARDIZEM) tablet 30 mg  30 mg Oral TIDAC  ammonium lactate (AMLACTIN) 12 % cream   Topical DAILY  zinc oxide-white petrolatum 17-57 % topical paste   Topical TID  sodium chloride (NS) flush 5-40 mL  5-40 mL IntraVENous Q8H  
 sodium chloride (NS) flush 5-40 mL  5-40 mL IntraVENous PRN  
 acetaminophen (TYLENOL) tablet 650 mg  650 mg Oral Q6H PRN Or  
 acetaminophen (TYLENOL) suppository 650 mg  650 mg Rectal Q6H PRN  
 ondansetron (ZOFRAN ODT) tablet 4 mg  4 mg Oral Q8H PRN  Or  
 ondansetron (ZOFRAN) injection 4 mg  4 mg IntraVENous Q6H PRN  [Held by provider] aspirin delayed-release tablet 81 mg  81 mg Oral DAILY  atorvastatin (LIPITOR) tablet 40 mg  40 mg Oral DAILY  hydrALAZINE (APRESOLINE) 20 mg/mL injection 20 mg  20 mg IntraVENous Q6H PRN  
 budesonide-formoteroL (SYMBICORT) 160-4.5 mcg/actuation HFA inhaler 2 Puff  2 Puff Inhalation BID RT  
 influenza vaccine  (6 mos+)(PF) (FLUARIX/FLULAVAL/FLUZONE QUAD) injection 0.5 mL  1 Each IntraMUSCular PRIOR TO DISCHARGE  piperacillin-tazobactam (ZOSYN) 3.375 g in 0.9% sodium chloride (MBP/ADV) 100 mL MBP  3.375 g IntraVENous Q8H Review of Systems: 
 
Review of Systems Constitutional: Positive for malaise/fatigue. Negative for chills and fever. HENT: Negative for congestion and sore throat. Respiratory: Negative for cough and hemoptysis. Cardiovascular: Negative for chest pain and palpitations. Gastrointestinal: Positive for heartburn. Negative for abdominal pain, nausea and vomiting. Genitourinary: Negative for dysuria and urgency. Neurological: Negative for dizziness and headaches. Objective:  
 
Visit Vitals BP (!) 109/50 (BP 1 Location: Right upper arm, BP Patient Position: At rest) Pulse 98 Temp 97.7 °F (36.5 °C) Resp 20 Ht 6' (1.829 m) Wt 113.4 kg (250 lb) SpO2 99% BMI 33.91 kg/m² O2 Flow Rate (L/min): 2 l/min O2 Device: Nasal cannula Temp (24hrs), Av.6 °F (36.4 °C), Min:97 °F (36.1 °C), Max:98 °F (36.7 °C) No intake/output data recorded. 10/28 1901 - 10/30 0700 In: 720 [P.O.:720] Out: 5061 [[id number]] PHYSICAL EXAM: 
 
Physical Exam  
General: He is not in acute distress. 
   Appearance: He is obese. HENT:  
   Mouth/Throat:  
   Dentition: Abnormal dentition. Dental caries present. Cardiovascular:  
   Rate and Rhythm: Normal rate and regular rhythm.  
   Pulses: Normal pulses.  
   Heart sounds: Normal heart sounds.   
Pulmonary:  
   Effort: Pulmonary effort is normal.  
   Breath sounds: Normal breath sounds. Abdominal:  
   General: Bowel sounds are normal.  
   Palpations: Abdomen is soft. Genitourinary: 
   Comments: voiding  clear yellow urine. Skin: 
   General: Skin is warm and dry.  
   Comments: Bilateral legs with compression wraps,  sacrum with redness Neurological:  
   Mental Status: He is oriented to person, place, and time.  
  
 
Data Review No results found for this or any previous visit (from the past 24 hour(s)). LOWER EXT ART PVR MULT LEVEL SEG PRESSURES Final Result DUPLEX LOWER EXT VENOUS BILAT Final Result US RETROPERITONEUM COMP Final Result 1. Medical renal disease. No hydronephrosis. 2.  Numerous large bilateral renal cysts. 3.  Urinary bladder postvoid residual of 103 mL. XR CHEST PORT Final Result Worsening pulmonary edema, suggesting CHF or volume overload. XR CHEST PORT Final Result XR FOOT LT AP/LAT Final Result No radiographic evidence of osteomyelitis. XR FOOT RT AP/LAT Final Result Nonspecific nonfocal osteopenia. Active Problems: 
  Multiple open wounds of foot (10/21/2021) Bilateral leg edema (10/22/2021) Acute kidney injury ([address] (10/23/2021) Assessment/Plan: 1. Bilateral foot ulcers- secondary to chronic venous stasis, ID following, 
Cultures w/ multiorganisms, IV zosyn until 11/1 Lower leg venous duplex done, no DVTs, vascular recommending compression wraps for both legs.  
Wound care ordered, aquacel Ag to both feet.  
  
2.  Acute on chronic kidney disease- nephrology following, creatinine  3.45 
  
3.  CHF- diastolic dysfunction, EF 12-91%, reduced systolic function, aortic valve regurgitation, with RVAP at 11. PO lasix bid, cardiology following,  
  
4. Generalized weakness- OT/PT , OOB to chair as tolerated 
  
5. Atrial flutter- paroxsymal , on cardizem, cardiology following,  
Continue telemetry 
  
6.  Anemia-  Secondary to GI bleed , positive occult blood, GI consulted,plans for EGD Cbc pending today 
  
7. Discharge- SNF planned, CM following.  
  
 
 
 
DVT Prophylaxis: sequential compression Code Status: 
Full Code POA: 
 
Care Plan discussed with:  
________patient, staff nurse_______________________________________________________ Ron Saini NP

## 2021-10-30 NOTE — ROUTINE PROCESS
Bedside shift change report given to May 1827 (oncoming nurse) by Cass Reynolds (offgoing nurse). Report included the following information SBAR.

## 2021-10-30 NOTE — PROGRESS NOTES
Infectious Diseases Progress Note  Jacob Hernandez MD  [phone]   Date:10/30/2021       Room:[facility]  Patient 2018 [facility]     YOB: 1940     Age:81 y.o. 81M with history of smoking, hypertension, dyslipidemia, repaired AAA, obesity. Chronic venous stasis of the lower extremities with recurrent inflammation.     2018 I saw him at [facility] for complications of venous stasis dermatitis. At that hospital visit right leg wound cultures revealed MSSA and PSEUDOMONAS, managed with a two weeks course of levofloxacin and doxycycline at that time.     10/21/21 presents to hospital with a complaint of bilateral foot wound checks and lower extremity edema. Associated with bilateral lower extremity pain. EMS found him hypoxic and managed with supplemental oxygen. During the ED course chest x-ray showing pulmonary vascular congestion without overt pulmonary edema as well as left basilar airspace disease most likely atelectasis. Started on IV diuresis. Admitted to hospital and podiatry and wound care have been asked to see him for his condition.     Wound cultures from the right and left foot suggestive of multiple organisms and I have been asked to see him in consultation. Subjective    Subjective:  Symptoms:  Stable. No shortness of breath or chest pain. Review of Systems   Constitutional: Negative for fever. Respiratory: Negative for shortness of breath. Cardiovascular: Negative for chest pain. All other systems reviewed and are negative. Objective         Vitals Last 24 Hours:  TEMPERATURE:  Temp  Av.7 °F (36.5 °C)  Min: 97.6 °F (36.4 °C)  Max: 98 °F (36.7 °C)  RESPIRATIONS RANGE: Resp  Av.4  Min: 18  Max: 20  PULSE OXIMETRY RANGE: SpO2  Av.3 %  Min: 90 %  Max: 99 %  PULSE RANGE: Pulse  Av.2  Min: 47  Max: 66  BLOOD PRESSURE RANGE: Systolic (51PXI), PJ , Min:111 , MZD:360   ; Diastolic (16TKV), NIM:37, Min:42, Max:66    I/O (24Hr):     Intake/Output Summary (Last 24 hours) at 10/30/2021 0517  Last data filed at 10/30/2021 0405  Gross per 24 hour   Intake 720 ml   Output 1400 ml   Net -680 ml     Objective:  General Appearance:  Comfortable. Vital signs: (most recent): Blood pressure (!) 141/56, pulse (!) 58, temperature 97.6 °F (36.4 °C), resp. rate 18, height 6' (1.829 m), weight 113.4 kg (250 lb), SpO2 97 %. Vital signs are normal.  No fever. Constitutional: No acute distress  Skin: Extremities and face reveal no rashes, lower feet wrapped in a dressing which are clean, dry, intact, chronic lymphedema noted in legs bilaterally  HEENT: Sclerae anicteric. Extra-occular muscles are intact. No oral ulcers. The neck is supple and no masses. Cardiovascular: RRR  Respiratory:  Clear breath sounds bilaterally with no wheezes, rales, or rhonchi. GI: Abdomen nondistended, soft, and nontender. Normal active bowel sounds. Musculoskeletal: No pitting edema of the lower legs. Able to move all ext  Neurological:  Patient is alert and oriented. Cranial nerves II-XII grossly intact  Psychiatric: Mood appears appropriate     Labs/Imaging/Diagnostics    Labs:  CBC:  Recent Labs     10/29/21  0430 10/28/21  0708 10/27/21  1149   WBC 14.1* 14.3* 17.4*   RBC 2.75* 2.15* 2.65*   HGB 7.9* 6.1* 7.6*   HCT 26.5* 21.2* 25.6*   MCV 96.4 98.6 96.6   RDW 17.2* 15.4* 15.1*    180 215     CHEMISTRIES:  Recent Labs     10/29/21  0430 10/28/21  0708 10/27/21  1149   * 152* 146*   K 3.9 3.3* 3.8   * 114* 107   CO2 29 27 31   * 132* 143*   CA 8.3* 7.2* 8.4*   PHOS 4.6 3.8 4.6   PT/INR:No results for input(s): INR, INREXT, INREXT in the last 72 hours. No lab exists for component: PROTIME  APTT:No results for input(s): APTT in the last 72 hours. LIVER PROFILE:No results for input(s): AST, ALT in the last 72 hours.     No lab exists for component: PHUONG Tyler  Lab Results   Component Value Date/Time    ALT (SGPT) 24 10/21/2021 09:20 AM AST (SGOT) 37 10/21/2021 09:20 AM    Alk. phosphatase 119 (H) 10/21/2021 09:20 AM    Bilirubin, total 0.6 10/21/2021 09:20 AM       Imaging Last 24 Hours:  No results found. Assessment//Plan   Active Problems:    Multiple open wounds of foot (10/21/2021)      Bilateral leg edema (10/22/2021)      Acute kidney injury ([address] (10/23/2021)      Assessment & Plan  81M with history of smoking, hypertension, dyslipidemia, repaired AAA, obesity. Chronic venous stasis of the lower extremities with recurrent inflammation.     July 2018 I saw him at [facility] for complications of venous stasis dermatitis. At that hospital visit right leg wound cultures revealed MSSA and PSEUDOMONAS, managed with a two weeks course of levofloxacin and doxycycline at that time.     10/21/21 presents to hospital with a complaint of bilateral foot wound checks and lower extremity edema. Associated with bilateral lower extremity pain. EMS found him hypoxic and managed with supplemental oxygen. During the ED course chest x-ray showing pulmonary vascular congestion without overt pulmonary edema as well as left basilar airspace disease most likely atelectasis. Started on IV diuresis.  Admitted to hospital and podiatry and wound care have been asked to see him for his condition.     Wound cultures from the right and left foot suggestive of multiple organisms and I have been asked to see him in consultation.     MICROBIOLOGY     7/28/18            R leg    MSSA                                      Pseudomonas     10/21/21          Blood   Negative     10/21/21          R foot   Heavy Gram Negative Rods MULTIPLE COLONY TYPES                                      checking for possible BETA STREPTOCOCCUS     10/21/21          L foot   Heavy Streptococci, beta hemolyticgroup C     Myroides species     Proteus vulgaris     Morganella morganii ssp morganii     ASSESSMENT AND RECOMMENDATIONS    1) Chronic venous stasis of the bilateral lower extremities further complicated by complex soft tissue infection.      Zosyn iv per pharmacy dosing through 11/1/21              Wound care inpatient and outpatient        Electronically signed by Darylene Rosier, MD on 10/30/2021 at 12:54 PM

## 2021-10-30 NOTE — PROGRESS NOTES
US Guided PIV 
 10/30/21 1124 Peripheral IV 10/30/21 Anterior;Right Forearm Placement Date/Time: 10/30/21 1124   Number of Attempts: (c) 1  Inserted By: Devante Colin RN  Size: (c) 20 G  Orientation: Anterior;Right  Location: Forearm Site Assessment Clean, dry, & intact; Ecchymotic (bruised) (bruising noted throughout forearm) Phlebitis Assessment 0 Infiltration Assessment 0 Dressing Status Clean, dry, & intact; New Dressing Type Tape;Transparent Hub Color/Line Status Pink;Patent; Flushed; Other (comment) (brisk blood return) Alcohol Cap Used Yes

## 2021-10-30 NOTE — PROGRESS NOTES
Progress Note Patient: Barbra Martinez MRN: [id number]  SSN: [id number] YOB: 1940  Age: [de-identified] y.o. Sex: male Admit Date: 10/21/2021 LOS: 8 days Subjective:  
 
Patient is comfortable denies any abdominal pain or overt bleeding his hemoglobin is low. Objective:  
 
Vitals:  
 10/30/21 0840 10/30/21 1122 10/30/21 1428 10/30/21 1637 BP:  (!) 109/50 (!) 105/34 (!) 106/50 Pulse:  98 (!) 113 Resp:  20 18 Temp:  97.7 °F (36.5 °C) 97.5 °F (36.4 °C) SpO2: 95% 99% 99% Weight:      
Height:      
  
 
Intake and Output: 
Current Shift: No intake/output data recorded. Last three shifts: 10/28 1901 - 10/30 0700 In: 720 [P.O.:720] Out: 0979 [[id number]] Physical Exam:  
Skin:  Extremities and face reveal no rashes. No lopez erythema. HEENT: Sclerae anicteric. Extra-occular muscles are intact. No abnormal pigmentation of the lips. The neck is supple. Cardiovascular: Regular rate and rhythm. Respiratory:  Comfortable breathing with no accessory muscle use. GI:  Abdomen nondistended, soft, and nontender. No enlargement of the liver or spleen. No masses palpable. Rectal:  Deferred Musculoskeletal: Extremities have good range of motion. Neurological:  Gross memory appears intact. Patient is alert and oriented. Psychiatric:  Mood appears appropriate with judgement intact. Lymphatic:  No visible adenopathy Lab/Data Review: No results found for this or any previous visit (from the past 24 hour(s)). LOWER EXT ART PVR MULT LEVEL SEG PRESSURES Final Result DUPLEX LOWER EXT VENOUS BILAT Final Result US RETROPERITONEUM COMP Final Result 1. Medical renal disease. No hydronephrosis. 2.  Numerous large bilateral renal cysts. 3.  Urinary bladder postvoid residual of 103 mL. XR CHEST PORT Final Result Worsening pulmonary edema, suggesting CHF or volume overload. XR CHEST PORT Final Result XR FOOT LT AP/LAT Final Result No radiographic evidence of osteomyelitis. XR FOOT RT AP/LAT Final Result Nonspecific nonfocal osteopenia. Assessment:  
 
Active Problems: 
  Multiple open wounds of foot (10/21/2021) Bilateral leg edema (10/22/2021) Acute kidney injury ([facility] [address] (10/23/2021) Anemia with suspected GI bleeding I will plan to do a upper endoscopy on Monday Plan:  
I will plan to do a upper endoscopy on Monday Thank you for allowing me to participate in this patients care Signed By: Gabriela Elizabeth MD   
 October 30, 2021

## 2021-10-31 NOTE — PROGRESS NOTES
Hospitalist Progress Note    Subjective:   Daily Progress Note: 10/31/2021 3:11 PM    Hospital Course:  Pt admitted for complaints of bilateral foot wounds, and leg edema. Xray of right foot showing osteopenia, Infectious disease and wound RN consulted for recommendations. Right and Left foot wound culture growing heavy streptococci, myroides, proteun, morganella, pasteurella. ID consulted for IV antibiotic management.   10/28: Hemoglobin dropped to 6.1 and was transfused one unit of PRBCs, found to have occult blood positive,and GI was consulted.      Subjective: Pt seen in room, c/o of abdominal pain and fatigue    Current Facility-Administered Medications   Medication Dose Route Frequency    HYDROcodone-acetaminophen (NORCO) 5-325 mg per tablet 1 Tablet  1 Tablet Oral Q4H PRN    loperamide (IMODIUM) capsule 2 mg  2 mg Oral Q4H PRN    hydrOXYzine pamoate (VISTARIL) capsule 25 mg  25 mg Oral TID PRN    0.9% sodium chloride infusion 250 mL  250 mL IntraVENous PRN    pantoprazole (PROTONIX) tablet 40 mg  40 mg Oral ACB    magnesium hydroxide (MILK OF MAGNESIA) 400 mg/5 mL oral suspension 30 mL  30 mL Oral DAILY PRN    alum-mag hydroxide-simeth (MYLANTA) oral suspension 30 mL  30 mL Oral QID PRN    metoprolol succinate (TOPROL-XL) XL tablet 25 mg  25 mg Oral DAILY    0.9% sodium chloride infusion 250 mL  250 mL IntraVENous PRN    [Held by provider] polyethylene glycol (MIRALAX) packet 17 g  17 g Oral DAILY    docusate sodium (COLACE) capsule 100 mg  100 mg Oral BID    calcitRIOL (ROCALTROL) capsule 0.25 mcg  0.25 mcg Oral DAILY    epoetin charlie-epbx (RETACRIT) injection 10,000 Units  10,000 Units SubCUTAneous Q7D    furosemide (LASIX) tablet 40 mg  40 mg Oral BID    dilTIAZem IR (CARDIZEM) tablet 30 mg  30 mg Oral TIDAC    ammonium lactate (AMLACTIN) 12 % cream   Topical DAILY    zinc oxide-white petrolatum 17-57 % topical paste   Topical TID    sodium chloride (NS) flush 5-40 mL  5-40 mL IntraVENous Q8H    sodium chloride (NS) flush 5-40 mL  5-40 mL IntraVENous PRN    acetaminophen (TYLENOL) tablet 650 mg  650 mg Oral Q6H PRN    Or    acetaminophen (TYLENOL) suppository 650 mg  650 mg Rectal Q6H PRN    ondansetron (ZOFRAN ODT) tablet 4 mg  4 mg Oral Q8H PRN    Or    ondansetron (ZOFRAN) injection 4 mg  4 mg IntraVENous Q6H PRN    [Held by provider] aspirin delayed-release tablet 81 mg  81 mg Oral DAILY    atorvastatin (LIPITOR) tablet 40 mg  40 mg Oral DAILY    hydrALAZINE (APRESOLINE) 20 mg/mL injection 20 mg  20 mg IntraVENous Q6H PRN    budesonide-formoteroL (SYMBICORT) 160-4.5 mcg/actuation HFA inhaler 2 Puff  2 Puff Inhalation BID RT    influenza vaccine  (6 mos+)(PF) (FLUARIX/FLULAVAL/FLUZONE QUAD) injection 0.5 mL  1 Each IntraMUSCular PRIOR TO DISCHARGE    piperacillin-tazobactam (ZOSYN) 3.375 g in 0.9% sodium chloride (MBP/ADV) 100 mL MBP  3.375 g IntraVENous Q8H        Review of Systems:    Review of Systems   Constitutional: Negative for chills and fever. HENT: Negative for congestion and sore throat. Respiratory: Negative for cough and hemoptysis. Cardiovascular: Negative for chest pain and palpitations. Gastrointestinal: Positive for abdominal pain. Genitourinary: Negative for dysuria and urgency. Neurological: Negative for dizziness and headaches. Objective:     Visit Vitals  BP (!) 112/49 (BP 1 Location: Left upper arm)   Pulse 62   Temp 97.4 °F (36.3 °C)   Resp 20   Ht 6' (1.829 m)   Wt 113.4 kg (250 lb)   SpO2 97%   BMI 33.91 kg/m²    O2 Flow Rate (L/min): 2 l/min O2 Device: Nasal cannula    Temp (24hrs), Av.6 °F (36.4 °C), Min:97.3 °F (36.3 °C), Max:98 °F (36.7 °C)      10/31 07 - 10/31 1900  In: 312.5   Out: -   10/29 1901 - 10/31 0700  In: 720 [P.O.:720]  Out:  [Urine:1650]    PHYSICAL EXAM:    Physical Exam   General: He is not in acute distress.     Appearance: He is obese.    HENT:      Mouth/Throat:      Dentition: Abnormal dentition. Dental caries present. Cardiovascular:      Rate and Rhythm: Normal rate and regular rhythm.      Pulses: Normal pulses.      Heart sounds: Normal heart sounds. Pulmonary:      Effort: Pulmonary effort is normal.      Breath sounds: Normal breath sounds. Abdominal:      General: Bowel sounds are normal.      Palpations: Abdomen is soft. Genitourinary:     Comments: voiding  clear yellow urine. Skin:     General: Skin is warm and dry.      Comments: Bilateral legs with compression wraps,  sacrum with redness  Neurological:      Mental Status: He is oriented to person, place, and time. Data Review    Recent Results (from the past 24 hour(s))   CBC WITH AUTOMATED DIFF    Collection Time: 10/31/21  9:31 AM   Result Value Ref Range    WBC 21.1 (H) 4.1 - 11.1 K/uL    RBC 1.93 (L) 4.10 - 5.70 M/uL    HGB 5.7 (LL) 12.1 - 17.0 g/dL    HCT 19.1 (L) 36.6 - 50.3 %    MCV 99.0 80.0 - 99.0 FL    MCH 29.5 26.0 - 34.0 PG    MCHC 29.8 (L) 30.0 - 36.5 g/dL    RDW 19.0 (H) 11.5 - 14.5 %    PLATELET 142 494 - 352 K/uL    MPV 11.5 8.9 - 12.9 FL    NRBC 2.4 (H) 0.0  WBC    ABSOLUTE NRBC 0.51 (H) 0.00 - 0.01 K/uL    NEUTROPHILS 77 (H) 32 - 75 %    LYMPHOCYTES 10 (L) 12 - 49 %    MONOCYTES 6 5 - 13 %    EOSINOPHILS 1 0 - 7 %    BASOPHILS 0 0 - 1 %    IMMATURE GRANULOCYTES 6 (H) 0 - 0.5 %    ABS. NEUTROPHILS 16.4 (H) 1.8 - 8.0 K/UL    ABS. LYMPHOCYTES 2.1 0.8 - 3.5 K/UL    ABS. MONOCYTES 1.3 (H) 0.0 - 1.0 K/UL    ABS. EOSINOPHILS 0.1 0.0 - 0.4 K/UL    ABS. BASOPHILS 0.1 0.0 - 0.1 K/UL    ABS. IMM. GRANS.  1.2 (H) 0.00 - 0.04 K/UL    DF AUTOMATED     METABOLIC PANEL, COMPREHENSIVE    Collection Time: 10/31/21  9:31 AM   Result Value Ref Range    Sodium 142 136 - 145 mmol/L    Potassium 4.5 3.5 - 5.1 mmol/L    Chloride 107 97 - 108 mmol/L    CO2 27 21 - 32 mmol/L    Anion gap 8 5 - 15 mmol/L    Glucose 124 (H) 65 - 100 mg/dL     (H) 6 - 20 mg/dL    Creatinine 3.82 (H) 0.70 - 1.30 mg/dL    BUN/Creatinine ratio 47 (H) 12 - 20      GFR est AA 19 (L) >60 ml/min/1.73m2    GFR est non-AA 15 (L) >60 ml/min/1.73m2    Calcium 8.0 (L) 8.5 - 10.1 mg/dL    Bilirubin, total 0.3 0.2 - 1.0 mg/dL    AST (SGOT) 18 15 - 37 U/L    ALT (SGPT) 22 12 - 78 U/L    Alk. phosphatase 58 45 - 117 U/L    Protein, total 4.7 (L) 6.4 - 8.2 g/dL    Albumin 1.5 (L) 3.5 - 5.0 g/dL    Globulin 3.2 2.0 - 4.0 g/dL    A-G Ratio 0.5 (L) 1.1 - 2.2     RENAL FUNCTION PANEL    Collection Time: 10/31/21  9:31 AM   Result Value Ref Range    Sodium 142 136 - 145 mmol/L    Potassium 4.4 3.5 - 5.1 mmol/L    Chloride 106 97 - 108 mmol/L    CO2 26 21 - 32 mmol/L    Anion gap 10 5 - 15 mmol/L    Glucose 126 (H) 65 - 100 mg/dL     (H) 6 - 20 mg/dL    Creatinine 3.82 (H) 0.70 - 1.30 mg/dL    BUN/Creatinine ratio 48 (H) 12 - 20      GFR est AA 19 (L) >60 ml/min/1.73m2    GFR est non-AA 15 (L) >60 ml/min/1.73m2    Calcium 8.1 (L) 8.5 - 10.1 mg/dL    Phosphorus 4.8 (H) 2.6 - 4.7 mg/dL    Albumin 1.5 (L) 3.5 - 5.0 g/dL       LOWER EXT ART PVR MULT LEVEL SEG PRESSURES   Final Result      DUPLEX LOWER EXT VENOUS BILAT   Final Result      US RETROPERITONEUM COMP   Final Result   1. Medical renal disease. No hydronephrosis. 2.  Numerous large bilateral renal cysts. 3.  Urinary bladder postvoid residual of 103 mL. XR CHEST PORT   Final Result   Worsening pulmonary edema, suggesting CHF or volume overload. XR CHEST PORT   Final Result      XR FOOT LT AP/LAT   Final Result   No radiographic evidence of osteomyelitis. XR FOOT RT AP/LAT   Final Result   Nonspecific nonfocal osteopenia. Active Problems:    Multiple open wounds of foot (10/21/2021)      Bilateral leg edema (10/22/2021)      Acute kidney injury ([facility] [address] (10/23/2021)        Assessment/Plan:     1.  Bilateral foot ulcers- secondary to chronic venous stasis, ID following,  Cultures w/ multiorganisms, IV zosyn until 11/1, Bump in WBC, repeat cbc in am  Lower leg venous duplex done, no DVTs, vascular recommending compression wraps for both legs.   Wound care ordered, aquacel Ag to both feet.      2.  Acute on chronic kidney disease stage 4- stable creatinine 3.82.      3.  CHF- diastolic dysfunction, EF 15-52%, reduced systolic function, aortic valve regurgitation, with RVAP at 11. PO lasix bid, cardiology following,      4. Generalized weakness- OT/PT     5. Atrial flutter- paroxsymal , on cardizem, cardiology following,   Continue telemetry     6. Anemia-  Secondary to GI bleed , positive occult blood, HGB 5.7 today,  GI consulted, EGD planned for Monday am.      7. Code status- pt wishes to be DNR, spoke with ronel who is POA Jose Guadalupe  Has paperwork will bring into for chart    8.  Abdominal pain/ rise in WBC-  will get CT abdomen/pelvis WO contrast today.           DVT Prophylaxis: sequential compression  Code Status:  DNR  POA: [id number], [address] discussed with:   _______patient,staff nurse, pt's ronel ________________________________________________________    Ron Saini NP

## 2021-10-31 NOTE — PROGRESS NOTES
Renal Progress Note Patient: Arelis Gallego MRN: [id number]  SSN: [id number] YOB: 1940  Age: [de-identified] y.o. Sex: male Admit Date: 10/21/2021 LOS: 8 days Subjective:  
Patient seen at bedside. Alert and awake, no acute distress. No new c/o today No new labs today Current Facility-Administered Medications Medication Dose Route Frequency  magnesium hydroxide (MILK OF MAGNESIA) 400 mg/5 mL oral suspension 30 mL  30 mL Oral DAILY PRN  
 alum-mag hydroxide-simeth (MYLANTA) oral suspension 30 mL  30 mL Oral QID PRN  
 [START ON 10/31/2021] metoprolol succinate (TOPROL-XL) XL tablet 25 mg  25 mg Oral DAILY  0.9% sodium chloride infusion 250 mL  250 mL IntraVENous PRN  polyethylene glycol (MIRALAX) packet 17 g  17 g Oral DAILY  docusate sodium (COLACE) capsule 100 mg  100 mg Oral BID  calcitRIOL (ROCALTROL) capsule 0.25 mcg  0.25 mcg Oral DAILY  epoetin charlie-epbx (RETACRIT) injection 10,000 Units  10,000 Units SubCUTAneous Q7D  
 furosemide (LASIX) tablet 40 mg  40 mg Oral BID  dilTIAZem IR (CARDIZEM) tablet 30 mg  30 mg Oral TIDAC  ammonium lactate (AMLACTIN) 12 % cream   Topical DAILY  zinc oxide-white petrolatum 17-57 % topical paste   Topical TID  sodium chloride (NS) flush 5-40 mL  5-40 mL IntraVENous Q8H  
 sodium chloride (NS) flush 5-40 mL  5-40 mL IntraVENous PRN  
 acetaminophen (TYLENOL) tablet 650 mg  650 mg Oral Q6H PRN Or  
 acetaminophen (TYLENOL) suppository 650 mg  650 mg Rectal Q6H PRN  
 ondansetron (ZOFRAN ODT) tablet 4 mg  4 mg Oral Q8H PRN Or  
 ondansetron (ZOFRAN) injection 4 mg  4 mg IntraVENous Q6H PRN  
 [Held by provider] aspirin delayed-release tablet 81 mg  81 mg Oral DAILY  atorvastatin (LIPITOR) tablet 40 mg  40 mg Oral DAILY  hydrALAZINE (APRESOLINE) 20 mg/mL injection 20 mg  20 mg IntraVENous Q6H PRN  
 budesonide-formoteroL (SYMBICORT) 160-4.5 mcg/actuation HFA inhaler 2 Puff  2 Puff Inhalation BID RT  influenza vaccine 2021-22 (6 mos+)(PF) (FLUARIX/FLULAVAL/FLUZONE QUAD) injection 0.5 mL  1 Each IntraMUSCular PRIOR TO DISCHARGE  piperacillin-tazobactam (ZOSYN) 3.375 g in 0.9% sodium chloride (MBP/ADV) 100 mL MBP  3.375 g IntraVENous Q8H Vitals:  
 10/30/21 1428 10/30/21 1637 10/30/21 1915 10/30/21 1937 BP: (!) 105/34 (!) 106/50 107/75 (!) 109/58 Pulse: (!) 113  (!) 51 63 Resp: 18  18 18 Temp: 97.5 °F (36.4 °C)  97.9 °F (36.6 °C) 98 °F (36.7 °C) SpO2: 99%  98% 98% Weight:      
Height:      
 
Objective:  
General: alert awake well-oriented, no acute distress. HEENT: EOMI, no Icterus, no Pallor,  mucosa moist. 
Neck: Neck is supple, No JVD Lungs: breathsounds normal,  no rhonchi, no rales, CVS: heart sounds normal,  no murmurs, no rubs. GI: soft, nontender, normal BS. Extremeties: no cyanosis, 1+  edema, Neuro: Alert, awake, oriented x3, No new focal deficits, moving all extremeties well. Skin: normal skin turgor, no skin rashes. Intake and Output: 
Current Shift: No intake/output data recorded. Last three shifts: 10/29 0701 - 10/30 1900 In: 720 [P.O.:720] Out: 1062 [HTSXL:6770] Lab/Data Review: 
Recent Labs 10/29/21 
0430 10/28/21 
1376 WBC 14.1* 14.3* HGB 7.9* 6.1*  
HCT 26.5* 21.2*  
 180 Recent Labs 10/29/21 
0430 10/28/21 
5261 * 152* K 3.9 3.3*  
* 114* CO2 29 27 * 95 * 132* CREA 3.59* 3.03* CA 8.3* 7.2*  
PHOS 4.6 3.8 ALB 1.8* 1.6* No results for input(s): PH, PCO2, PO2, HCO3, FIO2 in the last 72 hours. No results found for this or any previous visit (from the past 24 hour(s)). Assessment and Plan:  
 
1 acute kidney injury on underlying chronic kidney disease stage unknown.  
 -Etiology is prerenal azotemia from cardiorenal.   
-On admission BUN/creatinine 47/2.8 and has gone up to 71/3.8.  ,  
creat down to 3.52--3.47--3.45--3.0--3.5 Continue diuretics for fluid overload/edema Repeat renal functions tomorrow 2. Chronic kidney disease, probably has a stage IV chronic kidney disease as baseline Renal ultrasound showed larger kidneys with multiple bilateral cysts suggestive of polycystic kidney disease. significant proteinuria+, will avoid ace-I for now for risk of MAURILIO 
 intact PTH is 122 and vitamin D level is nomal 
Added cacitriol 0.25mcg daily 
will continue to monitor renal functions to assess the baseline 2. Hypotension. Improved hemodynamic status now Amlodipine was discontinued Continue to monitor blood pressures 3. CHF. -He is admitted with decompensated CHF.   
-2D echocardiogram has been ordered.   
-On admission he was with hypoxic respiratory failure and lower extremity edema.   
-Improved clinically with the diuresis, continue current Lasix and continue to monitor inputs and outputs and renal functions 
  
4.  Bilateral lower extremity wounds.   
-On IV antibiotics as per ID recommendations. Vascular surgery evaluating for vascular insufficiency 5. Anemia: Hb dropped to 6.1 and up to 7.9 today post Txc GI following, EGD planned Monitor H/H and transfuse as needed Add epogen sq [employer] Signed By: Leela Looney MD   
 October 30, 2021

## 2021-11-01 NOTE — PROGRESS NOTES
Infectious Diseases Progress Note  Jacob Gusman MD  [phone]   Date:10/31/2021       Room:[facility]  Patient 2018 [facility]     YOB: 1940     Age:81 y.o. 81M with history of smoking, hypertension, dyslipidemia, repaired AAA, obesity. Chronic venous stasis of the lower extremities with recurrent inflammation.     2018 I saw him at [facility] for complications of venous stasis dermatitis. At that hospital visit right leg wound cultures revealed MSSA and PSEUDOMONAS, managed with a two weeks course of levofloxacin and doxycycline at that time.     10/21/21 presents to hospital with a complaint of bilateral foot wound checks and lower extremity edema. Associated with bilateral lower extremity pain. EMS found him hypoxic and managed with supplemental oxygen. During the ED course chest x-ray showing pulmonary vascular congestion without overt pulmonary edema as well as left basilar airspace disease most likely atelectasis. Started on IV diuresis. Admitted to hospital and podiatry and wound care have been asked to see him for his condition.     Wound cultures from the right and left foot suggestive of multiple organisms and I have been asked to see him in consultation. Subjective    Subjective:  Symptoms:  Stable. No shortness of breath or chest pain. Review of Systems   Constitutional: Negative for fever. Respiratory: Negative for shortness of breath. Cardiovascular: Negative for chest pain. All other systems reviewed and are negative. Objective         Vitals Last 24 Hours:  TEMPERATURE:  Temp  Av.5 °F (36.4 °C)  Min: 97.3 °F (36.3 °C)  Max: 97.7 °F (36.5 °C)  RESPIRATIONS RANGE: Resp  Av.8  Min: 16  Max: 20  PULSE OXIMETRY RANGE: SpO2  Av.4 %  Min: 95 %  Max: 100 %  PULSE RANGE: Pulse  Av.4  Min: 62  Max: 75  BLOOD PRESSURE RANGE: Systolic (52UMG), UVB:944 , Min:100 , PGN:367   ; Diastolic (43DXI), TWQ:29, Min:46, Max:61    I/O (24Hr):     Intake/Output Summary (Last 24 hours) at 10/31/2021 2143  Last data filed at 10/31/2021 1945  Gross per 24 hour   Intake 1280 ml   Output    Net 1280 ml     Objective:  General Appearance:  Comfortable. Vital signs: (most recent): Blood pressure 135/60, pulse 67, temperature 97.6 °F (36.4 °C), resp. rate 20, height 6' (1.829 m), weight 113.4 kg (250 lb), SpO2 95 %. Vital signs are normal.  No fever. Constitutional: No acute distress  Skin: Extremities and face reveal no rashes, lower feet wrapped in a dressing which are clean, dry, intact, chronic lymphedema noted in legs bilaterally  HEENT: Sclerae anicteric. Extra-occular muscles are intact. No oral ulcers. The neck is supple and no masses. Cardiovascular: RRR  Respiratory:  Clear breath sounds bilaterally with no wheezes, rales, or rhonchi. GI: Abdomen nondistended, soft, and nontender. Normal active bowel sounds. Musculoskeletal: No pitting edema of the lower legs. Able to move all ext  Neurological:  Patient is alert and oriented. Cranial nerves II-XII grossly intact  Psychiatric: Mood appears appropriate     Labs/Imaging/Diagnostics    Labs:  CBC:  Recent Labs     10/31/21  0931 10/29/21  0430   WBC 21.1* 14.1*   RBC 1.93* 2.75*   HGB 5.7* 7.9*   HCT 19.1* 26.5*   MCV 99.0 96.4   RDW 19.0* 17.2*    164     CHEMISTRIES:  Recent Labs     10/31/21  0931 10/29/21  0430     142 147*   K 4.4  4.5 3.9     107 110*   CO2 26  27 29   *  179* 143*   CA 8.1*  8.0* 8.3*   PHOS 4.8* 4.6   PT/INR:No results for input(s): INR, INREXT, INREXT in the last 72 hours. No lab exists for component: PROTIME  APTT:No results for input(s): APTT in the last 72 hours. LIVER PROFILE:  Recent Labs     10/31/21  0931   AST 18   ALT 22     Lab Results   Component Value Date/Time    ALT (SGPT) 22 10/31/2021 09:31 AM    AST (SGOT) 18 10/31/2021 09:31 AM    Alk.  phosphatase 58 10/31/2021 09:31 AM    Bilirubin, total 0.3 10/31/2021 09:31 AM       Imaging Last 24 Hours:  No results found. Assessment//Plan   Active Problems:    Multiple open wounds of foot (10/21/2021)      Bilateral leg edema (10/22/2021)      Acute kidney injury ([address] (10/23/2021)      Assessment & Plan  81M with history of smoking, hypertension, dyslipidemia, repaired AAA, obesity. Chronic venous stasis of the lower extremities with recurrent inflammation.     July 2018 I saw him at [facility] for complications of venous stasis dermatitis. At that hospital visit right leg wound cultures revealed MSSA and PSEUDOMONAS, managed with a two weeks course of levofloxacin and doxycycline at that time.     10/21/21 presents to hospital with a complaint of bilateral foot wound checks and lower extremity edema. Associated with bilateral lower extremity pain. EMS found him hypoxic and managed with supplemental oxygen. During the ED course chest x-ray showing pulmonary vascular congestion without overt pulmonary edema as well as left basilar airspace disease most likely atelectasis. Started on IV diuresis.  Admitted to hospital and podiatry and wound care have been asked to see him for his condition.     Wound cultures from the right and left foot suggestive of multiple organisms and I have been asked to see him in consultation.     MICROBIOLOGY     7/28/18            R leg    MSSA                                      Pseudomonas     10/21/21          Blood   Negative     10/21/21          R foot   Heavy Gram Negative Rods MULTIPLE COLONY TYPES                                      checking for possible BETA STREPTOCOCCUS     10/21/21          L foot   Heavy Streptococci, beta hemolyticgroup C     Myroides species     Proteus vulgaris     Morganella morganii ssp morganii     ASSESSMENT AND RECOMMENDATIONS    1) Chronic venous stasis of the bilateral lower extremities further complicated by complex soft tissue infection.      Zosyn iv per pharmacy dosing through 11/1/21              Wound care inpatient and outpatient        Electronically signed by Champ Goff MD on 10/31/2021 at 12:54 PM

## 2021-11-01 NOTE — PROGRESS NOTES
At 2am pt became very agitated and began pulling off his telemetry leads and refused to let leads be put back on. Last rate pt was in 140's. Perfect served Dr. Jany Negro no orders received.

## 2021-11-01 NOTE — PROGRESS NOTES
Renal Progress Note    Patient: Sai Singh MRN: [id number]  SSN: [id number]    YOB: 1940  Age: [de-identified] y.o. Sex: male      Admit Date: 10/21/2021    LOS: 9 days     Subjective:   Patient seen at bedside. Alert and awake, no acute distress.    HB down to 5.7 today, receiving pRBC tx  No c/o acute GI bleed  Creat up to 3.82    Current Facility-Administered Medications   Medication Dose Route Frequency    HYDROcodone-acetaminophen (NORCO) 5-325 mg per tablet 1 Tablet  1 Tablet Oral Q4H PRN    loperamide (IMODIUM) capsule 2 mg  2 mg Oral Q4H PRN    hydrOXYzine pamoate (VISTARIL) capsule 25 mg  25 mg Oral TID PRN    0.9% sodium chloride infusion 250 mL  250 mL IntraVENous PRN    pantoprazole (PROTONIX) tablet 40 mg  40 mg Oral ACB    magnesium hydroxide (MILK OF MAGNESIA) 400 mg/5 mL oral suspension 30 mL  30 mL Oral DAILY PRN    alum-mag hydroxide-simeth (MYLANTA) oral suspension 30 mL  30 mL Oral QID PRN    metoprolol succinate (TOPROL-XL) XL tablet 25 mg  25 mg Oral DAILY    0.9% sodium chloride infusion 250 mL  250 mL IntraVENous PRN    docusate sodium (COLACE) capsule 100 mg  100 mg Oral BID    calcitRIOL (ROCALTROL) capsule 0.25 mcg  0.25 mcg Oral DAILY    epoetin charlie-epbx (RETACRIT) injection 10,000 Units  10,000 Units SubCUTAneous Q7D    furosemide (LASIX) tablet 40 mg  40 mg Oral BID    dilTIAZem IR (CARDIZEM) tablet 30 mg  30 mg Oral TIDAC    ammonium lactate (AMLACTIN) 12 % cream   Topical DAILY    zinc oxide-white petrolatum 17-57 % topical paste   Topical TID    sodium chloride (NS) flush 5-40 mL  5-40 mL IntraVENous Q8H    sodium chloride (NS) flush 5-40 mL  5-40 mL IntraVENous PRN    acetaminophen (TYLENOL) tablet 650 mg  650 mg Oral Q6H PRN    Or    acetaminophen (TYLENOL) suppository 650 mg  650 mg Rectal Q6H PRN    ondansetron (ZOFRAN ODT) tablet 4 mg  4 mg Oral Q8H PRN    Or    ondansetron (ZOFRAN) injection 4 mg  4 mg IntraVENous Q6H PRN    atorvastatin (LIPITOR) tablet 40 mg  40 mg Oral DAILY    hydrALAZINE (APRESOLINE) 20 mg/mL injection 20 mg  20 mg IntraVENous Q6H PRN    budesonide-formoteroL (SYMBICORT) 160-4.5 mcg/actuation HFA inhaler 2 Puff  2 Puff Inhalation BID RT    influenza vaccine 2021-22 (6 mos+)(PF) (FLUARIX/FLULAVAL/FLUZONE QUAD) injection 0.5 mL  1 Each IntraMUSCular PRIOR TO DISCHARGE    piperacillin-tazobactam (ZOSYN) 3.375 g in 0.9% sodium chloride (MBP/ADV) 100 mL MBP  3.375 g IntraVENous Q8H        Vitals:    10/31/21 1733 10/31/21 1741 10/31/21 1800 10/31/21 1927   BP: (!) 135/58 (!) 126/56 (!) 123/55 135/60   Pulse: 67 75 72 67   Resp: 20 20 20 20   Temp: 97.7 °F (36.5 °C) 97.5 °F (36.4 °C) 97.5 °F (36.4 °C) 97.6 °F (36.4 °C)   SpO2: 99% 100%  95%   Weight:       Height:         Objective:   General: alert awake well-oriented, no acute distress. HEENT: EOMI, no Icterus, no Pallor,  mucosa moist.  Neck: Neck is supple, No JVD  Lungs: breathsounds normal,  no rhonchi, no rales,   CVS: heart sounds normal,  no murmurs, no rubs. GI: soft, nontender, normal BS. Extremeties: no cyanosis, 1+  edema,   Neuro: Alert, awake, oriented x3, No new focal deficits, moving all extremeties well. Skin: normal skin turgor, no skin rashes. Intake and Output:  Current Shift: 10/31 1901 - 11/01 0700  In: 310   Out: -   Last three shifts: 10/30 0701 - 10/31 1900  In: 970   Out: -       Lab/Data Review:  Recent Labs     10/31/21  0931 10/29/21  0430   WBC 21.1* 14.1*   HGB 5.7* 7.9*   HCT 19.1* 26.5*    164     Recent Labs     10/31/21  0931 10/29/21  0430     142 147*   K 4.4  4.5 3.9     107 110*   CO2 26  27 29   *  124* 104*   *  179* 143*   CREA 3.82*  3.82* 3.59*   CA 8.1*  8.0* 8.3*   PHOS 4.8* 4.6   ALB 1.5*  1.5* 1.8*   TBILI 0.3  --    ALT 22  --      No results for input(s): PH, PCO2, PO2, HCO3, FIO2 in the last 72 hours.   Recent Results (from the past 24 hour(s))   CBC WITH AUTOMATED DIFF Collection Time: 10/31/21  9:31 AM   Result Value Ref Range    WBC 21.1 (H) 4.1 - 11.1 K/uL    RBC 1.93 (L) 4.10 - 5.70 M/uL    HGB 5.7 (LL) 12.1 - 17.0 g/dL    HCT 19.1 (L) 36.6 - 50.3 %    MCV 99.0 80.0 - 99.0 FL    MCH 29.5 26.0 - 34.0 PG    MCHC 29.8 (L) 30.0 - 36.5 g/dL    RDW 19.0 (H) 11.5 - 14.5 %    PLATELET 963 735 - 173 K/uL    MPV 11.5 8.9 - 12.9 FL    NRBC 2.4 (H) 0.0  WBC    ABSOLUTE NRBC 0.51 (H) 0.00 - 0.01 K/uL    NEUTROPHILS 83 (H) 32 - 75 %    LYMPHOCYTES 12 12 - 49 %    MONOCYTES 4 (L) 5 - 13 %    EOSINOPHILS 0 0 - 7 %    BASOPHILS 1 0 - 1 %    IMMATURE GRANULOCYTES 0 %    ABS. NEUTROPHILS 17.6 (H) 1.8 - 8.0 K/UL    ABS. LYMPHOCYTES 2.5 0.8 - 3.5 K/UL    ABS. MONOCYTES 0.8 0.0 - 1.0 K/UL    ABS. EOSINOPHILS 0.0 0.0 - 0.4 K/UL    ABS. BASOPHILS 0.2 (H) 0.0 - 0.1 K/UL    ABS. IMM. GRANS. 0.0 K/UL    DF AUTOMATED      RBC COMMENTS Basophilic Stippling  1+        RBC COMMENTS Anisocytosis  2+        RBC COMMENTS Target Cells  2+       METABOLIC PANEL, COMPREHENSIVE    Collection Time: 10/31/21  9:31 AM   Result Value Ref Range    Sodium 142 136 - 145 mmol/L    Potassium 4.5 3.5 - 5.1 mmol/L    Chloride 107 97 - 108 mmol/L    CO2 27 21 - 32 mmol/L    Anion gap 8 5 - 15 mmol/L    Glucose 124 (H) 65 - 100 mg/dL     (H) 6 - 20 mg/dL    Creatinine 3.82 (H) 0.70 - 1.30 mg/dL    BUN/Creatinine ratio 47 (H) 12 - 20      GFR est AA 19 (L) >60 ml/min/1.73m2    GFR est non-AA 15 (L) >60 ml/min/1.73m2    Calcium 8.0 (L) 8.5 - 10.1 mg/dL    Bilirubin, total 0.3 0.2 - 1.0 mg/dL    AST (SGOT) 18 15 - 37 U/L    ALT (SGPT) 22 12 - 78 U/L    Alk.  phosphatase 58 45 - 117 U/L    Protein, total 4.7 (L) 6.4 - 8.2 g/dL    Albumin 1.5 (L) 3.5 - 5.0 g/dL    Globulin 3.2 2.0 - 4.0 g/dL    A-G Ratio 0.5 (L) 1.1 - 2.2     RENAL FUNCTION PANEL    Collection Time: 10/31/21  9:31 AM   Result Value Ref Range    Sodium 142 136 - 145 mmol/L    Potassium 4.4 3.5 - 5.1 mmol/L    Chloride 106 97 - 108 mmol/L    CO2 26 21 - 32 mmol/L    Anion gap 10 5 - 15 mmol/L    Glucose 126 (H) 65 - 100 mg/dL     (H) 6 - 20 mg/dL    Creatinine 3.82 (H) 0.70 - 1.30 mg/dL    BUN/Creatinine ratio 48 (H) 12 - 20      GFR est AA 19 (L) >60 ml/min/1.73m2    GFR est non-AA 15 (L) >60 ml/min/1.73m2    Calcium 8.1 (L) 8.5 - 10.1 mg/dL    Phosphorus 4.8 (H) 2.6 - 4.7 mg/dL    Albumin 1.5 (L) 3.5 - 5.0 g/dL        Assessment and Plan:     1 acute kidney injury on underlying chronic kidney disease stage unknown.    -Etiology is prerenal azotemia from cardiorenal.    -On admission BUN/creatinine 47/2.8 and has gone up to 71/3.8.  ,   creat down to 3.52--3.47--3.45--3.5 --3.82  Increased creatinine is probably related to severe anemia with borderline hypotension  Continue diuretics for fluid overload/edema  Repeat renal functions tomorrow    2. Chronic kidney disease, probably has a stage IV chronic kidney disease as baseline  Renal ultrasound showed larger kidneys with multiple bilateral cysts suggestive of polycystic kidney disease. significant proteinuria+, will avoid ace-I for now for risk of MAURILIO   intact PTH is 122 and vitamin D level is nomal  Added cacitriol 0.25mcg daily  will continue to monitor renal functions to assess the baseline    2. Hypotension. Improved hemodynamic status now  Amlodipine was discontinued  Continue to monitor blood pressures     3. CHF. -He is admitted with decompensated CHF.    -2D echocardiogram has been ordered.    -On admission he was with hypoxic respiratory failure and lower extremity edema.    -Improved clinically with the diuresis, continue current Lasix and continue to monitor inputs and outputs and renal functions     4.  Bilateral lower extremity wounds.    -On IV antibiotics as per ID recommendations. Vascular surgery evaluating for vascular insufficiency    5.  Anemia: Hb dropped to 5.7 , probably GI bleed   GI following, EGD planned  Monitor H/H and transfuse as needed  Added epogen sq qweekly    Signed By: Gagan Nguyen MD     October 31, 2021

## 2021-11-01 NOTE — PROGRESS NOTES
Telemetry called and notified this nurse that pt heart rate was in the 30's. Went in and found pt unresponsive. No heart tones auscultated verified with second nurse [address]. Notified [facility] (Elaine Michele [phone]) at [address] at [facility] at 500 and physician at [phone].

## 2021-11-02 NOTE — PROGRESS NOTES
was not notified of patient's death. [address] [facility]  can be contacted by calling  and requesting  on call

## 2021-12-15 NOTE — DISCHARGE SUMMARY
Death Summary     Patient ID:    Monica Muhammad  [id number]  38 y.o.  1940    Admit date: 10/21/2021    Date and time of death: 11/01/2021 0450    Chronic Diagnoses:    Problem List as of 11/1/2021 Never Reviewed          Codes Class Noted - Resolved    Acute kidney injury ([facility] [address] ICD-10-CM: N17.9  ICD-9-CM: 584.9  10/23/2021 - Present        Bilateral leg edema ICD-10-CM: R60.0  ICD-9-CM: 782.3  10/22/2021 - Present        Multiple open wounds of foot ICD-10-CM: S91.309A  ICD-9-CM: 892.0  10/21/2021 - Present        Hypertensive disorder ICD-10-CM: I10  ICD-9-CM: 401.9  9/30/2020 - Present        Lymphedema ICD-10-CM: I89.0  ICD-9-CM: 457.1  9/30/2020 - Present          22    Final Diagnoses: Active Problems:    Multiple open wounds of foot (10/21/2021)      Bilateral leg edema (10/22/2021)      Acute kidney injury ([facility] [address] (10/23/2021)        Reason for Hospitalization:  Bilateral foot wounds    Hospital Course:   Pt admitted for complaints of bilateral foot wounds, and leg edema. Xray of right foot showing osteopenia, Infectious disease and wound RN consulted for recommendations. Right and Left foot wound culture growing heavy streptococci, myroides, proteun, morganella, pasteurella. ID consulted for IV antibiotic management.   10/28: Hemoglobin dropped to 6.1 and was transfused one unit of PRBCs, found to have occult blood positive,and GI was consulted and EGD was planned for 11/1/21  10/31: Patient wishes to be DNR,  and Maryuri Whittington was contacted, he had paperwork at home and confirmed code status.        Family notified.  [phone]      Signed:  Jeanie Caballero NP  12/14/2021  8:03 PM